# Patient Record
Sex: FEMALE | Race: ASIAN | NOT HISPANIC OR LATINO
[De-identification: names, ages, dates, MRNs, and addresses within clinical notes are randomized per-mention and may not be internally consistent; named-entity substitution may affect disease eponyms.]

---

## 2017-04-04 ENCOUNTER — APPOINTMENT (OUTPATIENT)
Dept: INTERNAL MEDICINE | Facility: CLINIC | Age: 67
End: 2017-04-04

## 2017-04-04 VITALS
WEIGHT: 158 LBS | HEART RATE: 80 BPM | HEIGHT: 58 IN | BODY MASS INDEX: 33.17 KG/M2 | SYSTOLIC BLOOD PRESSURE: 150 MMHG | TEMPERATURE: 98.3 F | DIASTOLIC BLOOD PRESSURE: 90 MMHG

## 2017-04-04 DIAGNOSIS — Z86.59 PERSONAL HISTORY OF OTHER MENTAL AND BEHAVIORAL DISORDERS: ICD-10-CM

## 2017-04-04 DIAGNOSIS — F41.9 ANXIETY DISORDER, UNSPECIFIED: ICD-10-CM

## 2017-04-18 ENCOUNTER — APPOINTMENT (OUTPATIENT)
Dept: OTOLARYNGOLOGY | Facility: CLINIC | Age: 67
End: 2017-04-18

## 2017-04-18 VITALS
DIASTOLIC BLOOD PRESSURE: 84 MMHG | HEIGHT: 59 IN | SYSTOLIC BLOOD PRESSURE: 147 MMHG | BODY MASS INDEX: 31.85 KG/M2 | HEART RATE: 82 BPM | WEIGHT: 158 LBS

## 2017-04-18 DIAGNOSIS — H90.3 SENSORINEURAL HEARING LOSS, BILATERAL: ICD-10-CM

## 2017-05-10 ENCOUNTER — APPOINTMENT (OUTPATIENT)
Dept: INTERNAL MEDICINE | Facility: CLINIC | Age: 67
End: 2017-05-10

## 2017-05-10 VITALS
BODY MASS INDEX: 31.45 KG/M2 | DIASTOLIC BLOOD PRESSURE: 80 MMHG | HEIGHT: 59 IN | TEMPERATURE: 98.2 F | OXYGEN SATURATION: 98 % | SYSTOLIC BLOOD PRESSURE: 138 MMHG | WEIGHT: 156 LBS | HEART RATE: 88 BPM

## 2017-07-11 ENCOUNTER — APPOINTMENT (OUTPATIENT)
Dept: INTERNAL MEDICINE | Facility: CLINIC | Age: 67
End: 2017-07-11

## 2017-08-14 ENCOUNTER — MOBILE ON CALL (OUTPATIENT)
Age: 67
End: 2017-08-14

## 2017-09-13 ENCOUNTER — APPOINTMENT (OUTPATIENT)
Dept: INTERNAL MEDICINE | Facility: CLINIC | Age: 67
End: 2017-09-13

## 2017-09-20 ENCOUNTER — APPOINTMENT (OUTPATIENT)
Dept: INTERNAL MEDICINE | Facility: CLINIC | Age: 67
End: 2017-09-20

## 2017-10-03 ENCOUNTER — APPOINTMENT (OUTPATIENT)
Dept: INTERNAL MEDICINE | Facility: CLINIC | Age: 67
End: 2017-10-03
Payer: COMMERCIAL

## 2017-10-03 VITALS
TEMPERATURE: 98.9 F | BODY MASS INDEX: 31.45 KG/M2 | SYSTOLIC BLOOD PRESSURE: 115 MMHG | OXYGEN SATURATION: 98 % | HEART RATE: 81 BPM | WEIGHT: 156 LBS | DIASTOLIC BLOOD PRESSURE: 80 MMHG | HEIGHT: 59 IN

## 2017-10-03 DIAGNOSIS — H61.21 IMPACTED CERUMEN, RIGHT EAR: ICD-10-CM

## 2017-10-03 DIAGNOSIS — R05 COUGH: ICD-10-CM

## 2017-10-03 DIAGNOSIS — Z86.69 PERSONAL HISTORY OF OTHER DISEASES OF THE NERVOUS SYSTEM AND SENSE ORGANS: ICD-10-CM

## 2017-10-03 DIAGNOSIS — Z00.00 ENCOUNTER FOR GENERAL ADULT MEDICAL EXAMINATION W/OUT ABNORMAL FINDINGS: ICD-10-CM

## 2017-10-03 PROCEDURE — 99214 OFFICE O/P EST MOD 30 MIN: CPT | Mod: 25

## 2017-10-03 PROCEDURE — 90686 IIV4 VACC NO PRSV 0.5 ML IM: CPT

## 2017-10-03 PROCEDURE — G0008: CPT

## 2017-10-03 RX ORDER — CETIRIZINE HYDROCHLORIDE 10 MG/1
10 CAPSULE, LIQUID FILLED ORAL DAILY
Qty: 30 | Refills: 3 | Status: DISCONTINUED | COMMUNITY
Start: 2017-04-04 | End: 2017-10-03

## 2017-10-03 RX ORDER — OMEPRAZOLE 20 MG/1
20 CAPSULE, DELAYED RELEASE ORAL
Qty: 30 | Refills: 2 | Status: DISCONTINUED | COMMUNITY
Start: 2017-08-14 | End: 2017-10-03

## 2017-10-03 RX ORDER — OMEPRAZOLE 20 MG/1
20 CAPSULE, DELAYED RELEASE ORAL
Qty: 30 | Refills: 2 | Status: DISCONTINUED | COMMUNITY
Start: 2017-05-10 | End: 2017-10-03

## 2017-10-09 ENCOUNTER — FORM ENCOUNTER (OUTPATIENT)
Age: 67
End: 2017-10-09

## 2017-10-10 ENCOUNTER — OUTPATIENT (OUTPATIENT)
Dept: OUTPATIENT SERVICES | Facility: HOSPITAL | Age: 67
LOS: 1 days | End: 2017-10-10
Payer: COMMERCIAL

## 2017-10-10 ENCOUNTER — APPOINTMENT (OUTPATIENT)
Dept: MAMMOGRAPHY | Facility: IMAGING CENTER | Age: 67
End: 2017-10-10
Payer: COMMERCIAL

## 2017-10-10 DIAGNOSIS — Z00.00 ENCOUNTER FOR GENERAL ADULT MEDICAL EXAMINATION WITHOUT ABNORMAL FINDINGS: ICD-10-CM

## 2017-10-10 PROCEDURE — 77063 BREAST TOMOSYNTHESIS BI: CPT | Mod: 26

## 2017-10-10 PROCEDURE — G0202: CPT | Mod: 26

## 2017-10-10 PROCEDURE — 77067 SCR MAMMO BI INCL CAD: CPT

## 2017-10-10 PROCEDURE — 77063 BREAST TOMOSYNTHESIS BI: CPT

## 2017-10-23 ENCOUNTER — RESULT REVIEW (OUTPATIENT)
Age: 67
End: 2017-10-23

## 2017-10-24 ENCOUNTER — APPOINTMENT (OUTPATIENT)
Dept: OBGYN | Facility: CLINIC | Age: 67
End: 2017-10-24
Payer: COMMERCIAL

## 2017-10-24 PROCEDURE — 99396 PREV VISIT EST AGE 40-64: CPT

## 2017-10-25 ENCOUNTER — OUTPATIENT (OUTPATIENT)
Dept: OUTPATIENT SERVICES | Facility: HOSPITAL | Age: 67
LOS: 1 days | End: 2017-10-25
Payer: COMMERCIAL

## 2017-10-25 ENCOUNTER — APPOINTMENT (OUTPATIENT)
Dept: RADIOLOGY | Facility: IMAGING CENTER | Age: 67
End: 2017-10-25
Payer: COMMERCIAL

## 2017-10-25 DIAGNOSIS — M85.80 OTHER SPECIFIED DISORDERS OF BONE DENSITY AND STRUCTURE, UNSPECIFIED SITE: ICD-10-CM

## 2017-10-25 DIAGNOSIS — Z00.8 ENCOUNTER FOR OTHER GENERAL EXAMINATION: ICD-10-CM

## 2017-10-25 PROCEDURE — 77080 DXA BONE DENSITY AXIAL: CPT | Mod: 26

## 2017-10-25 PROCEDURE — 77080 DXA BONE DENSITY AXIAL: CPT

## 2017-10-31 ENCOUNTER — APPOINTMENT (OUTPATIENT)
Dept: INTERNAL MEDICINE | Facility: CLINIC | Age: 67
End: 2017-10-31

## 2019-01-02 ENCOUNTER — APPOINTMENT (OUTPATIENT)
Dept: INTERNAL MEDICINE | Facility: CLINIC | Age: 69
End: 2019-01-02
Payer: COMMERCIAL

## 2019-01-02 VITALS
HEIGHT: 59 IN | HEART RATE: 100 BPM | BODY MASS INDEX: 26.61 KG/M2 | SYSTOLIC BLOOD PRESSURE: 120 MMHG | OXYGEN SATURATION: 96 % | TEMPERATURE: 98.7 F | WEIGHT: 132 LBS | DIASTOLIC BLOOD PRESSURE: 78 MMHG

## 2019-01-02 PROCEDURE — 99213 OFFICE O/P EST LOW 20 MIN: CPT

## 2019-01-02 NOTE — REVIEW OF SYSTEMS
[Fatigue] : fatigue [Recent Change In Weight] : ~T recent weight change [Negative] : Neurological [Fever] : no fever [Chills] : no chills [Night Sweats] : no night sweats

## 2019-01-02 NOTE — PHYSICAL EXAM
[No Acute Distress] : no acute distress [Well Nourished] : well nourished [Well Developed] : well developed [Well-Appearing] : well-appearing [Normal Sclera/Conjunctiva] : normal sclera/conjunctiva [Normal Oropharynx] : the oropharynx was normal [No Lymphadenopathy] : no lymphadenopathy [Clear to Auscultation] : lungs were clear to auscultation bilaterally [Regular Rhythm] : with a regular rhythm [Soft] : abdomen soft [Non-distended] : non-distended [No HSM] : no HSM [Normal Bowel Sounds] : normal bowel sounds [de-identified] : mildly tender l&r

## 2019-01-02 NOTE — ASSESSMENT
[FreeTextEntry1] : hx fever; loss of appetite and abd pain\par doubt continued infection\par check labs incl stool culture/o&p, cbc/cmp\par consider id or gi referal

## 2019-01-02 NOTE — HISTORY OF PRESENT ILLNESS
[FreeTextEntry8] : Pt went [place]ve[place], while there developed stomach pains no fever, n/v/c/d, took "pain" meds there; came back 12/16 last week (~12/25) developed fever to 103 x 5 days, now resolved; still has stomach pain but "30% improved", nl stool; fatigued + 20-25 lb wt loss since last visit (october). No prophyalxis w/travel.

## 2019-01-03 LAB
ALBUMIN SERPL ELPH-MCNC: 3.8 G/DL
ALP BLD-CCNC: 151 U/L
ALT SERPL-CCNC: 15 U/L
AMYLASE/CREAT SERPL: 52 U/L
ANION GAP SERPL CALC-SCNC: 14 MMOL/L
AST SERPL-CCNC: 46 U/L
BASOPHILS # BLD AUTO: 0.06 K/UL
BASOPHILS NFR BLD AUTO: 0.3 %
BILIRUB SERPL-MCNC: 0.7 MG/DL
BUN SERPL-MCNC: 10 MG/DL
CALCIUM SERPL-MCNC: 9.5 MG/DL
CHLORIDE SERPL-SCNC: 95 MMOL/L
CO2 SERPL-SCNC: 24 MMOL/L
CREAT SERPL-MCNC: 0.63 MG/DL
EOSINOPHIL # BLD AUTO: 0.1 K/UL
EOSINOPHIL NFR BLD AUTO: 0.5 %
GLUCOSE SERPL-MCNC: 98 MG/DL
HCT VFR BLD CALC: 42.5 %
HGB BLD-MCNC: 13.7 G/DL
IMM GRANULOCYTES NFR BLD AUTO: 0.5 %
LPL SERPL-CCNC: 42 U/L
LYMPHOCYTES # BLD AUTO: 3.08 K/UL
LYMPHOCYTES NFR BLD AUTO: 16.1 %
MAN DIFF?: NORMAL
MCHC RBC-ENTMCNC: 28.8 PG
MCHC RBC-ENTMCNC: 32.2 GM/DL
MCV RBC AUTO: 89.5 FL
MONOCYTES # BLD AUTO: 1.81 K/UL
MONOCYTES NFR BLD AUTO: 9.5 %
NEUTROPHILS # BLD AUTO: 13.95 K/UL
NEUTROPHILS NFR BLD AUTO: 73.1 %
PLATELET # BLD AUTO: 388 K/UL
POTASSIUM SERPL-SCNC: 5 MMOL/L
PROT SERPL-MCNC: 7.6 G/DL
RBC # BLD: 4.75 M/UL
RBC # FLD: 14.5 %
SODIUM SERPL-SCNC: 133 MMOL/L
WBC # FLD AUTO: 19.09 K/UL

## 2019-01-07 ENCOUNTER — APPOINTMENT (OUTPATIENT)
Dept: INFECTIOUS DISEASE | Facility: CLINIC | Age: 69
End: 2019-01-07
Payer: COMMERCIAL

## 2019-01-07 ENCOUNTER — LABORATORY RESULT (OUTPATIENT)
Age: 69
End: 2019-01-07

## 2019-01-07 VITALS
SYSTOLIC BLOOD PRESSURE: 137 MMHG | HEIGHT: 59 IN | DIASTOLIC BLOOD PRESSURE: 82 MMHG | TEMPERATURE: 96.7 F | BODY MASS INDEX: 27.01 KG/M2 | RESPIRATION RATE: 14 BRPM | HEART RATE: 84 BPM | WEIGHT: 134 LBS | OXYGEN SATURATION: 100 %

## 2019-01-07 PROCEDURE — 99205 OFFICE O/P NEW HI 60 MIN: CPT

## 2019-01-07 NOTE — ASSESSMENT
[FreeTextEntry1] : 68 f with no significant past medical history, originally from [place] now [place] in the [place], came back a month trip to [place] about 3 weeks ago, referred to ID for fever, loss of appetite, weight loss, intermittent abd pain, leukocytosis 19\par \par * pt did not have any fever for 4 days and feels overall better\par * will check CBC, CMP, blood culture\par * brucella serology\par * u/a, urine cx, stool PCR\par * abd/pelvis CT with contrast\par * no antibiotics for now\par * RTC in 2 weeks

## 2019-01-07 NOTE — HISTORY OF PRESENT ILLNESS
[FreeTextEntry1] : 68 f with no significant past medical history, originally from [place] the [place], came back a month trip to [facility] about 3 weeks ago. She developed low grade fevers and abd pain there and then when she came back the fever went as high as 103 but no chills or sweating, joint pain, rash, cough, N/V/D or dysuria. The abd pain is intermittent and not severe, sometimes back pain on both sides.\par she had lost over 20 ibs within the past 3 weeks with loss of appetite and fatigue.\par She denied sick contacts. TB exposure, unpasteurized dairy products or bug bites\par She did not take any malaria ppx for her trip but received a flu shot before going\par She went to her PMD and had a WBC of 19\par She stated that for the past 4 days she did not have a fever and overall feels better\par patient's cell phone: [phone]

## 2019-01-07 NOTE — REVIEW OF SYSTEMS
[Fever] : fever [Chills] : no chills [Body Aches] : no body aches [Recent Weight Loss (___ Lbs)] : recent [unfilled] ~Ulb weight loss [Eye Pain] : no eye pain [Red Eyes] : eyes not red [Earache] : no earache [Loss Of Hearing] : no hearing loss [Chest Pain] : no chest pain [Palpitations] : no palpitations [Shortness Of Breath] : no shortness of breath [Wheezing] : no wheezing [Cough] : no cough [Dysuria] : no dysuria [Pelvic Pain] : no pelvic pain [Vaginal Discharge] : no vaginal discharge [Joint Pain] : no joint pain [Joint Swelling] : no joint swelling [Skin Lesions] : no skin lesions [Skin Wound] : no skin wound [Confused] : no confusion [Convulsions] : no convulsions [Suicidal] : not suicidal [Anxiety] : no anxiety [Easy Bleeding] : no tendency for easy bleeding [Easy Bruising] : no tendency for easy bruising [Negative] : Heme/Lymph

## 2019-01-07 NOTE — PHYSICAL EXAM
[General Appearance - Alert] : alert [General Appearance - In No Acute Distress] : in no acute distress [General Appearance - Well Nourished] : well nourished [Sclera] : the sclera and conjunctiva were normal [Extraocular Movements] : extraocular movements were intact [Outer Ear] : the ears and nose were normal in appearance [Oropharynx] : the oropharynx was normal with no thrush [Neck Appearance] : the appearance of the neck was normal [Neck Cervical Mass (___cm)] : no neck mass was observed [Auscultation Breath Sounds / Voice Sounds] : lungs were clear to auscultation bilaterally [Heart Rate And Rhythm] : heart rate was normal and rhythm regular [Heart Sounds] : normal S1 and S2 [Full Pulse] : the pedal pulses are present [Edema] : there was no peripheral edema [Bowel Sounds] : normal bowel sounds [Abdomen Soft] : soft [Costovertebral Angle Tenderness] : no CVA tenderness [No Palpable Adenopathy] : no palpable adenopathy [Musculoskeletal - Swelling] : no joint swelling [Skin Color & Pigmentation] : normal skin color and pigmentation [] : no rash [Cranial Nerves] : cranial nerves 2-12 were intact [No Focal Deficits] : no focal deficits [Oriented To Time, Place, And Person] : oriented to person, place, and time [Affect] : the affect was normal

## 2019-01-08 LAB
ALBUMIN SERPL ELPH-MCNC: 3.3 G/DL
ALP BLD-CCNC: 148 U/L
ALT SERPL-CCNC: 16 U/L
ANION GAP SERPL CALC-SCNC: 11 MMOL/L
APPEARANCE: ABNORMAL
AST SERPL-CCNC: 53 U/L
BACTERIA STL CULT: NORMAL
BACTERIA: NEGATIVE
BASOPHILS # BLD AUTO: 0.05 K/UL
BASOPHILS NFR BLD AUTO: 0.3 %
BILIRUB SERPL-MCNC: 0.5 MG/DL
BILIRUBIN URINE: ABNORMAL
BLOOD URINE: NEGATIVE
BUN SERPL-MCNC: 6 MG/DL
CALCIUM SERPL-MCNC: 9.8 MG/DL
CHLORIDE SERPL-SCNC: 100 MMOL/L
CO2 SERPL-SCNC: 24 MMOL/L
COLOR: ABNORMAL
CREAT SERPL-MCNC: 0.46 MG/DL
DEPRECATED O AND P PREP STL: ABNORMAL
EOSINOPHIL # BLD AUTO: 0.2 K/UL
EOSINOPHIL NFR BLD AUTO: 1 %
GLUCOSE QUALITATIVE U: NEGATIVE MG/DL
GLUCOSE SERPL-MCNC: 74 MG/DL
HCT VFR BLD CALC: 40.6 %
HGB BLD-MCNC: 12.7 G/DL
HYALINE CASTS: 0 /LPF
IMM GRANULOCYTES NFR BLD AUTO: 0.5 %
KETONES URINE: ABNORMAL
LEUKOCYTE ESTERASE URINE: NEGATIVE
LYMPHOCYTES # BLD AUTO: 2.73 K/UL
LYMPHOCYTES NFR BLD AUTO: 14.1 %
MAN DIFF?: NORMAL
MCHC RBC-ENTMCNC: 28.8 PG
MCHC RBC-ENTMCNC: 31.3 GM/DL
MCV RBC AUTO: 92.1 FL
MICROSCOPIC-UA: NORMAL
MONOCYTES # BLD AUTO: 1.6 K/UL
MONOCYTES NFR BLD AUTO: 8.3 %
NEUTROPHILS # BLD AUTO: 14.67 K/UL
NEUTROPHILS NFR BLD AUTO: 75.8 %
NITRITE URINE: NEGATIVE
PH URINE: 5.5
PLATELET # BLD AUTO: 491 K/UL
POTASSIUM SERPL-SCNC: 5.3 MMOL/L
PROT SERPL-MCNC: 7.7 G/DL
PROTEIN URINE: ABNORMAL MG/DL
RBC # BLD: 4.41 M/UL
RBC # FLD: 14.5 %
RED BLOOD CELLS URINE: 5 /HPF
SODIUM SERPL-SCNC: 135 MMOL/L
SPECIFIC GRAVITY URINE: 1.03
SQUAMOUS EPITHELIAL CELLS: 5 /HPF
URINE COMMENTS: NORMAL
UROBILINOGEN URINE: NEGATIVE MG/DL
WBC # FLD AUTO: 19.34 K/UL
WHITE BLOOD CELLS URINE: 2 /HPF

## 2019-01-11 ENCOUNTER — FORM ENCOUNTER (OUTPATIENT)
Age: 69
End: 2019-01-11

## 2019-01-12 ENCOUNTER — APPOINTMENT (OUTPATIENT)
Dept: CT IMAGING | Facility: IMAGING CENTER | Age: 69
End: 2019-01-12
Payer: COMMERCIAL

## 2019-01-12 ENCOUNTER — OUTPATIENT (OUTPATIENT)
Dept: OUTPATIENT SERVICES | Facility: HOSPITAL | Age: 69
LOS: 1 days | End: 2019-01-12
Payer: COMMERCIAL

## 2019-01-12 DIAGNOSIS — R10.9 UNSPECIFIED ABDOMINAL PAIN: ICD-10-CM

## 2019-01-12 PROCEDURE — 74177 CT ABD & PELVIS W/CONTRAST: CPT

## 2019-01-12 PROCEDURE — 74177 CT ABD & PELVIS W/CONTRAST: CPT | Mod: 26

## 2019-01-14 ENCOUNTER — OTHER (OUTPATIENT)
Age: 69
End: 2019-01-14

## 2019-01-14 ENCOUNTER — MOBILE ON CALL (OUTPATIENT)
Age: 69
End: 2019-01-14

## 2019-01-14 LAB
BACTERIA BLD CULT: NORMAL
BACTERIA UR CULT: NORMAL
BRUCELLA  AB SCREEN IGM, S: NEGATIVE
BRUCELLA AB SCREEN IGG, S: NEGATIVE
GI PCR PANEL, STOOL: NORMAL
INTERPRETATION: NORMAL
M TB IFN-G BLD-IMP: NEGATIVE
QUANTIFERON TB PLUS MITOGEN MINUS NIL: 0.8 IU/ML
QUANTIFERON TB PLUS NIL: 0.02 IU/ML
QUANTIFERON TB PLUS TB1 MINUS NIL: 0 IU/ML
QUANTIFERON TB PLUS TB2 MINUS NIL: 0 IU/ML

## 2019-01-15 ENCOUNTER — INPATIENT (INPATIENT)
Facility: HOSPITAL | Age: 69
LOS: 2 days | Discharge: HOME | End: 2019-01-18
Attending: HOSPITALIST | Admitting: HOSPITALIST
Payer: COMMERCIAL

## 2019-01-15 VITALS
RESPIRATION RATE: 16 BRPM | DIASTOLIC BLOOD PRESSURE: 102 MMHG | TEMPERATURE: 97 F | OXYGEN SATURATION: 100 % | HEART RATE: 95 BPM | SYSTOLIC BLOOD PRESSURE: 150 MMHG

## 2019-01-15 DIAGNOSIS — N83.9 NONINFLAMMATORY DISORDER OF OVARY, FALLOPIAN TUBE AND BROAD LIGAMENT, UNSPECIFIED: ICD-10-CM

## 2019-01-15 DIAGNOSIS — R65.10 SYSTEMIC INFLAMMATORY RESPONSE SYNDROME (SIRS) OF NON-INFECTIOUS ORIGIN WITHOUT ACUTE ORGAN DYSFUNCTION: ICD-10-CM

## 2019-01-15 DIAGNOSIS — K75.0 ABSCESS OF LIVER: ICD-10-CM

## 2019-01-15 DIAGNOSIS — K76.9 LIVER DISEASE, UNSPECIFIED: ICD-10-CM

## 2019-01-15 DIAGNOSIS — Z29.9 ENCOUNTER FOR PROPHYLACTIC MEASURES, UNSPECIFIED: ICD-10-CM

## 2019-01-15 PROCEDURE — 99223 1ST HOSP IP/OBS HIGH 75: CPT

## 2019-01-15 PROCEDURE — 99223 1ST HOSP IP/OBS HIGH 75: CPT | Mod: GC

## 2019-01-15 RX ORDER — PIPERACILLIN AND TAZOBACTAM 4; .5 G/20ML; G/20ML
3.38 INJECTION, POWDER, LYOPHILIZED, FOR SOLUTION INTRAVENOUS ONCE
Qty: 0 | Refills: 0 | Status: DISCONTINUED | OUTPATIENT
Start: 2019-01-15 | End: 2019-01-15

## 2019-01-15 RX ORDER — ACETAMINOPHEN 500 MG
650 TABLET ORAL EVERY 6 HOURS
Qty: 0 | Refills: 0 | Status: DISCONTINUED | OUTPATIENT
Start: 2019-01-15 | End: 2019-01-18

## 2019-01-15 RX ORDER — PIPERACILLIN AND TAZOBACTAM 4; .5 G/20ML; G/20ML
3.38 INJECTION, POWDER, LYOPHILIZED, FOR SOLUTION INTRAVENOUS ONCE
Qty: 0 | Refills: 0 | Status: COMPLETED | OUTPATIENT
Start: 2019-01-15 | End: 2019-01-15

## 2019-01-15 RX ORDER — METRONIDAZOLE 500 MG
500 TABLET ORAL ONCE
Qty: 0 | Refills: 0 | Status: COMPLETED | OUTPATIENT
Start: 2019-01-15 | End: 2019-01-15

## 2019-01-15 RX ORDER — SODIUM CHLORIDE 9 MG/ML
2000 INJECTION INTRAMUSCULAR; INTRAVENOUS; SUBCUTANEOUS ONCE
Qty: 0 | Refills: 0 | Status: COMPLETED | OUTPATIENT
Start: 2019-01-15 | End: 2019-01-15

## 2019-01-15 RX ORDER — CEFTRIAXONE 500 MG/1
2 INJECTION, POWDER, FOR SOLUTION INTRAMUSCULAR; INTRAVENOUS ONCE
Qty: 0 | Refills: 0 | Status: COMPLETED | OUTPATIENT
Start: 2019-01-15 | End: 2019-01-15

## 2019-01-15 RX ADMIN — CEFTRIAXONE 100 GRAM(S): 500 INJECTION, POWDER, FOR SOLUTION INTRAMUSCULAR; INTRAVENOUS at 11:47

## 2019-01-15 RX ADMIN — PIPERACILLIN AND TAZOBACTAM 200 GRAM(S): 4; .5 INJECTION, POWDER, LYOPHILIZED, FOR SOLUTION INTRAVENOUS at 17:37

## 2019-01-15 RX ADMIN — Medication 100 MILLIGRAM(S): at 11:48

## 2019-01-15 RX ADMIN — SODIUM CHLORIDE 1000 MILLILITER(S): 9 INJECTION INTRAMUSCULAR; INTRAVENOUS; SUBCUTANEOUS at 11:48

## 2019-01-15 NOTE — H&P ADULT - ASSESSMENT
67 yo F with no known medical history presenting w/ liver mass noted on outpatient CT in the setting of intermittent dull, diffuse abdominal pain x1 month and recent travel to [place].

## 2019-01-15 NOTE — ED PROVIDER NOTE - ATTENDING CONTRIBUTION TO CARE
Patient presents for eval of concern for poss liver abscess found outpatient ct. Patient had fevers/abd pain while in [place] 1 month ago, symptoms improved but has still been having intermittent upper abd pain over last 10d, last episode was last night. Had fevers previously but not in recent days. No ha, neck pain, cp, sob, vomiting, diarrhea, dysuria. Went to id md and had w/u including ct done 3 d ago which was concerning for poss liver abscess. sent to ed for further eval, has no symptoms at this time.  exam  GEN - NAD; well appearing; A+O x3   HEAD - NC/AT   EYES- PERRL, EOMI  ENT: Airway patent, mmm, Oral cavity and pharynx normal. No inflammation, swelling, exudate, or lesions.  NECK: Neck supple, non-tender without lymphadenopathy, no masses.  PULMONARY - CTA b/l, symmetric breath sounds.   CARDIAC -s1s2, RRR, no M,G,R  ABDOMEN - +BS, ND, NT, soft, no guarding, no rebound, no masses   BACK - no CVA tenderness, Normal  spine   EXTREMITIES - FROM, symmetric pulses, capillary refill < 2 seconds, no edema   SKIN - no rash or bruising   NEUROLOGIC - alert, speech clear, no focal deficits  PSYCH -nl mood/affect, nl insight.  a/p-patient with int abd pain, prev fevers, found to have poss liver abscess as outpatient, recent travel to [place], nontoxic appearing, abd nontender, vss, will treat with abx, labs, and admit for further w/u.

## 2019-01-15 NOTE — ED PROVIDER NOTE - OBJECTIVE STATEMENT
69 y/o F with no pertinent medical history presents with complaint of liver abscess noted on outpatient CT. Patient began to have fever and abdominal pain while visiting [place] one month ago. Has been having elevated WBCs and undergoing outpatient w/u. Saw infectious disease specialist and had blood/stool labs as well as CT which saw a liver abscess. Last fever was 10 days ago but still having some faint upper abdominal pain. +Weight loss of approximately 15 lbs. PMD Dr. Tirado. 67 y/o F with no pertinent medical history presents with complaint of liver abscess noted on outpatient CT. Patient began to have fever and abdominal pain while visiting [place] one month ago. Has been having elevated WBCs and undergoing outpatient w/u. Saw infectious disease specialist and had blood/stool labs as well as CT which saw a liver abscess. Last fever was 10 days ago but still having some faint upper abdominal pain. +Weight loss of approximately 15 lbs. PMD Dr. Luigi Aguillon and ID Dr. Marley.

## 2019-01-15 NOTE — H&P ADULT - NSHPREVIEWOFSYSTEMS_GEN_ALL_CORE
REVIEW OF SYSTEMS:    CONSTITUTIONAL: No weakness, fatigue, malaise, fevers or chills, no weight change, appetite change  EYES: No visual changes; No double vision,  No vertigo, eye pain  Ears: no otalgia, no otorrhea, no hearing loss, tinnitus  Nose: no epistaxis, rhinorrhea, post-discharge, sinus pressure  Throat: no throat pain, no oral lesions, tooth pain   NECK: No pain or stiffness  RESPIRATORY: No cough (productive or dry), wheezing, hemoptysis; No shortness of breath, orthopnea, PND, NAVA, snoring,  CARDIOVASCULAR: No chest pain or palpitations, no leg edema, no claudication    GASTROINTESTINAL: No abdominal or epigastric pain. No nausea, vomiting, or hematemesis; No diarrhea or constipation. No melena or hematochezia.  GENITOURINARY: No dysuria, frequency, urgency or hematuria, no pelvic pain, urinary incontinence, urgency  Musculoskeletal: no joints or muscle pain, no swelling in joints or muscles  NEUROLOGICAL: No numbness or weakness, headache, memory loss, seizures, dizziness, vertigo, syncope, ataxia  SKIN: No pruritis, rashes, lesions or new moles  Psych: No anxiety, sadness, insomnia, suicide thoughts  Endocrine: No Heat or Cold intolerance, polydipsia, polyphagia  Heme/Lymph: no LN enlargement, no easy bruising or bleeding REVIEW OF SYSTEMS:    CONSTITUTIONAL: + fevers, weight loss, appetite change. No weakness, fatigue, malaise  EYES: No visual changes; No double vision,  No vertigo, eye pain  Ears: no otalgia, no otorrhea, no hearing loss, tinnitus  Nose: no epistaxis, rhinorrhea, post-discharge, sinus pressure  Throat: no throat pain, no oral lesions, tooth pain   NECK: No pain or stiffness  RESPIRATORY: No cough (productive or dry), wheezing, hemoptysis; No shortness of breath, orthopnea, PND, NAVA, snoring,  CARDIOVASCULAR: No chest pain or palpitations, no leg edema, no claudication    GASTROINTESTINAL: No abdominal or epigastric pain. No nausea, vomiting, or hematemesis; No diarrhea or constipation. No melena or hematochezia.  GENITOURINARY: No dysuria, frequency, urgency or hematuria, no pelvic pain, urinary incontinence, urgency  Musculoskeletal: no joints or muscle pain, no swelling in joints or muscles  NEUROLOGICAL: No numbness or weakness, headache, memory loss, seizures, dizziness, vertigo, syncope, ataxia  SKIN: No pruritis, rashes, lesions or new moles  Psych: No anxiety, sadness, insomnia, suicide thoughts  Endocrine: No Heat or Cold intolerance, polydipsia, polyphagia  Heme/Lymph: no LN enlargement, no easy bruising or bleeding

## 2019-01-15 NOTE — ED PROCEDURE NOTE - PROCEDURE ADDITIONAL DETAILS
Limited RUQ US shows no gallstones, no gallbladder wall thickening and no hydrops. There is a very small amount of pericholecystic and perihepatic free fluid. There is a heterogenous soft tissue mass measuring 10.3x7.8x9cm with good vascular flow, along with multiple small hypoechoic masses around the liver; differential includes neoplastic/metastatic/infectious process. See images and report in chart.  Valerie [id number]

## 2019-01-15 NOTE — ED PROVIDER NOTE - PHYSICAL EXAMINATION
Gen:  alert, awake, no acute distress  HEENT:  atraumatic head, airway clear, pupils equal and round  CV:  rrr, nl S1, S2, no m/r/g  Pulm:  lungs CTA b/l  Abd: abdomen soft, tenderness in RUQ, no guarding or rebound  Ext:  moving all extremities  Neuro:  grossly intact, no focal deficits  Skin:  pale appearing Gen:  alert, awake, no acute distress  HEENT:  atraumatic head, airway clear, pupils equal and round  CV:  rrr, nl S1, S2, no m/r/g  Pulm:  lungs CTA b/l  Abd: abdomen soft, nontender, no guarding or rebound  Ext:  moving all extremities  Neuro:  grossly intact, no focal deficits  Skin:  pale appearing

## 2019-01-15 NOTE — H&P ADULT - PROBLEM SELECTOR PLAN 1
Pt w/ HR>90, leukocytosis of 19. Found to have to have multiple hepatic lesions, largest 11.1 cm. DDx includes infectious and neoplastic etiology.  - will c/w zosyn  - ID following  - will c/s IR for possible drainage Pt w/ HR>90, leukocytosis of 19. Found to have to have multiple hepatic lesions, largest 11.1 cm. DDx includes infectious and neoplastic etiology.  - will c/w zosyn  - ID following  - will c/s IR, will determine whether mass is drainable Pt w/ HR>90, leukocytosis of 19. Found to have to have multiple hepatic lesions, largest 11.1 cm. DDx includes infectious and neoplastic etiology.  - will c/w zosyn  - ID following  - will c/s IR, will determine whether mass is drainable  - will c/s GS  - f/u Bcxs Pt w/ HR>90, leukocytosis of 19. Found to have to have multiple hepatic lesions, largest 11.1 cm. DDx includes infectious and neoplastic etiology.  - will c/w zosyn  - ID following  - will c/s IR, will determine whether mass is drainable  - f/u Bcxs

## 2019-01-15 NOTE — CHART NOTE - NSCHARTNOTEFT_GEN_A_CORE
IR Pre-Procedure Note    Patient Age:   68y    Patient Gender:   Female    Procedure (including site / side if known): Liver fluid aspiration/biopsy    Diagnosis / Indication: Patient is a 68y old  Female who presents with a chief complaint of hepatic abscess (15 Catrachito 2019 15:00)      Interventional Radiology Attending Physician: Dr. Ocampo    Ordering Attending Physician: Dr. Byrd    PAST MEDICAL & SURGICAL HISTORY:  Obesity: BMI:  34.7  Grief at loss of child: Intermittent, s/p loss of child &#x27; 2013  Hypertension: Borderline. No meds  S/P tooth extraction: &#x27; 2011       Pertinent Labs:   CBC Full  -  ( 15 Catrachito 2019 10:10 )  WBC Count : 19.00 K/uL  Hemoglobin : 12.3 g/dL  Hematocrit : 37.5 %  Platelet Count - Automated : 386 K/uL  Mean Cell Volume : 87.0 fL  Mean Cell Hemoglobin : 28.5 pg  Mean Cell Hemoglobin Concentration : 32.8 %  Auto Neutrophil # : 14.95 K/uL  Auto Lymphocyte # : 2.46 K/uL  Auto Monocyte # : 1.35 K/uL  Auto Eosinophil # : 0.06 K/uL  Auto Basophil # : 0.07 K/uL  Auto Neutrophil % : 78.7 %  Auto Lymphocyte % : 12.9 %  Auto Monocyte % : 7.1 %  Auto Eosinophil % : 0.3 %  Auto Basophil % : 0.4 %    01-15    134<L>  |  97<L>  |  7   ----------------------------<  91  4.1   |  26  |  0.46<L>    Ca    9.9      15 Catrachito 2019 10:10    TPro  7.9  /  Alb  3.7  /  TBili  0.6  /  DBili  x   /  AST  50<H>  /  ALT  15  /  AlkPhos  164<H>  01-15    PT/INR - ( 15 Catrachito 2019 10:10 )   PT: 12.7 SEC;   INR: 1.11          PTT - ( 15 Catrachito 2019 10:10 )  PTT:28.6 SEC    Patient / Family aware of procedure:   [x] Y   [  ] N IR Pre-Procedure Note    Patient Age:   68y    Patient Gender:   Female    Procedure (including site / side if known): Liver fluid aspiration/biopsy    Diagnosis / Indication: Patient is a 68y old  Female who presents with a chief complaint of hepatic abscess (15 Catrachiot 2019 15:00)      Interventional Radiology Attending Physician: Dr. Ocampo    ****IF POSSIBLE PLEASE SEND FOR bacterial, fungal and AFB cultures of the abscess*********    Ordering Attending Physician: Dr. Byrd    PAST MEDICAL & SURGICAL HISTORY:  Obesity: BMI:  34.7  Grief at loss of child: Intermittent, s/p loss of child &#x27; 2013  Hypertension: Borderline. No meds  S/P tooth extraction: &#x27; 2011       Pertinent Labs:   CBC Full  -  ( 15 Catrachito 2019 10:10 )  WBC Count : 19.00 K/uL  Hemoglobin : 12.3 g/dL  Hematocrit : 37.5 %  Platelet Count - Automated : 386 K/uL  Mean Cell Volume : 87.0 fL  Mean Cell Hemoglobin : 28.5 pg  Mean Cell Hemoglobin Concentration : 32.8 %  Auto Neutrophil # : 14.95 K/uL  Auto Lymphocyte # : 2.46 K/uL  Auto Monocyte # : 1.35 K/uL  Auto Eosinophil # : 0.06 K/uL  Auto Basophil # : 0.07 K/uL  Auto Neutrophil % : 78.7 %  Auto Lymphocyte % : 12.9 %  Auto Monocyte % : 7.1 %  Auto Eosinophil % : 0.3 %  Auto Basophil % : 0.4 %    01-15    134<L>  |  97<L>  |  7   ----------------------------<  91  4.1   |  26  |  0.46<L>    Ca    9.9      15 Catrachito 2019 10:10    TPro  7.9  /  Alb  3.7  /  TBili  0.6  /  DBili  x   /  AST  50<H>  /  ALT  15  /  AlkPhos  164<H>  01-15    PT/INR - ( 15 Catrachito 2019 10:10 )   PT: 12.7 SEC;   INR: 1.11          PTT - ( 15 Catrachito 2019 10:10 )  PTT:28.6 SEC    Patient / Family aware of procedure:   [x] Y   [  ] N

## 2019-01-15 NOTE — ED PROVIDER NOTE - MEDICAL DECISION MAKING DETAILS
67 y/o F with no known pmhx sent in by infectious disease for hepatic abscess seen outpatient CT, reviewed in PACS. Patient is well appearing and afebrile. No repeat CT as it was performed 3 days ago. labs, IV abx, admit 67 y/o F with no known pmhx sent in by infectious disease for hepatic abscess seen outpatient CT, reviewed in PACS. Patient is well appearing and afebrile. No repeat CT as it was performed 3 days ago. labs, us, IV abx, admit

## 2019-01-15 NOTE — ED PROVIDER NOTE - PMH
Grief at loss of child  Intermittent, s/p loss of child ' 2013  Hypertension  Borderline. No meds  Obesity  BMI:  34.7

## 2019-01-15 NOTE — H&P ADULT - HISTORY OF PRESENT ILLNESS
67 yo F with no pertinent medical history presenting w/ liver abscess noted on outpatient CT. 67 yo F with no pertinent medical history presenting w/ liver abscess noted on outpatient CT. Patient traveled to [place] one month ago where she developed abdominal pain 69 yo F with no pertinent medical history presenting w/ liver abscess noted on outpatient CT. Patient traveled to [place] one month ago where she developed intermittent abdominal pain and fevers. Pt followed up w/ PMD on 1/2 and she was found to have a WBC count of 19. She then saw ID (Dr. Paul) on 1/7 and a CT A/P was ordered. CT showed numerous hypodense hepatic lesions (largest 11.1 cm) with mild pericholecystic stranding and wall thickening. Pt was told to go to the ER given the CT findings.     In the ED, VS Temp 97.4, HR 95, /102, RR 16 SpO2 100. WBCs 19. alk phos 164, AST 50, ALT 15. Pt s/p ceftriaxone 2g, metronidazole 500 mg, zosyn x1, 2 L NS. 67 yo F with no medical history presenting w/ liver mass noted on outpatient CT. Patient traveled to [facility] one month ago where she developed intermittent abdominal pain and fevers. Pt states that the pain is waxing/waning, dull, diffuse, 3/10 in intensity, and with no clear inciting factors. Pt states that her fevers were also intermittent w/ the highest fever at 103F but she has had no fevers/chills over the past week. Pt states she lost 15 pounds over the last month 2/2 decreased appetite. Pt followed up w/ PMD on 1/2 and she was found to have a WBC count of 19. She then saw ID (Dr. Paul) on 1/7 and a CT A/P was ordered. CT showed numerous hypodense hepatic lesions (largest 11.1 cm) with mild pericholecystic stranding and wall thickening. Pt was told to go to the ER given the CT findings. She denies cp, sob, n/v, diarrhea, URI sx, dysuria, night sweats. Pt endorses no prior hx of hepatitis, GI disorders. In terms of her travel hx, she stayed in the city ([place]), had no interactions with livestock, and drank only bottled water. No sick contacts.     In the ED, VS Temp 97.4, HR 95, /102, RR 16 SpO2 100. WBCs 19. alk phos 164, AST 50, ALT 15. Pt s/p ceftriaxone 2g, metronidazole 500 mg, zosyn x1, 2 L NS. 69 yo F with no medical history presenting w/ liver mass noted on outpatient CT. Patient traveled to [facility] one month ago where she developed intermittent abdominal pain and fevers. Pt states that the pain is waxing/waning, dull, diffuse, 3/10 in intensity, and with no clear inciting factors. Pt states that her fevers were also intermittent w/ the highest fever at 103F but she has had no fevers/chills over the past week. Pt states she lost 15 pounds over the last month 2/2 decreased appetite. Pt followed up w/ PMD on 1/2 and she was found to have a WBC count of 19. She then saw ID (Dr. Paul) on 1/7 and a CT A/P was ordered. CT showed numerous hypodense hepatic lesions (largest 11.1 cm) with mild pericholecystic stranding and wall thickening. Pt was told to go to the ER given the CT findings. She denies cp, sob, n/v, diarrhea, URI sx, dysuria, night sweats. Pt endorses no prior hx of hepatitis, GI disorders. In terms of her travel hx, she stayed in the city ([place]), had no interactions with livestock, and drank only bottled water. No sick contacts. Pt states that she has has a colonoscopy within the last 4 years that was normal and a recent mammo that was also normal.     In the ED, VS Temp 97.4, HR 95, /102, RR 16 SpO2 100. WBCs 19. alk phos 164, AST 50, ALT 15. Pt s/p ceftriaxone 2g, metronidazole 500 mg, zosyn x1, 2 L NS.

## 2019-01-15 NOTE — H&P ADULT - PROBLEM SELECTOR PLAN 3
Right adnexal dermoid ill-defined hypodensity of the left uterine body and fundal myometrium and fluid extending from the left adnexa visualized on outpt CT.  - will consider pelvic sono

## 2019-01-15 NOTE — H&P ADULT - NSHPLABSRESULTS_GEN_ALL_CORE
LABS:                         12.3   19.00 )-----------( 386      ( 15 Catrachito 2019 10:10 )             37.5     01-15    134<L>  |  97<L>  |  7   ----------------------------<  91  4.1   |  26  |  0.46<L>    Ca    9.9      15 Catrachito 2019 10:10    TPro  7.9  /  Alb  3.7  /  TBili  0.6  /  DBili  x   /  AST  50<H>  /  ALT  15  /  AlkPhos  164<H>  01-15    PT/INR - ( 15 Catrachito 2019 10:10 )   PT: 12.7 SEC;   INR: 1.11          PTT - ( 15 Catrachito 2019 10:10 )  PTT:28.6 SEC          RADIOLOGY, EKG & ADDITIONAL TESTS: Reviewed.    EXAM:  CT ABDOMEN AND PELVIS OC IC        PROCEDURE DATE:  01/12/2019           INTERPRETATION:  CLINICAL INFORMATION: Three weeks of fever, weight loss,   and abdominal pain.      COMPARISON: None.    PROCEDURE:   CT of the Abdomen and Pelvis was performed with intravenous contrast.   Intravenous contrast: 90 ml Omnipaque 350. 10 ml discarded.  Oral contrast: positive contrast was administered.  Sagittal and coronal reformats were performed.    FINDINGS:    LOWER CHEST: Within normal limits.    LIVER: There are multiple low-attenuation lesions throughout the liver   with largest lesion/conglomerate lesion centered in the right hepatic   lobe measuring 11.1 x 9.0 cm.  BILE DUCTS: There is mild dilatation of the common bile duct to 0.9 cm.   No obstructing lesion visualized.  GALLBLADDER: Mild pericholecystic inflammation appreciated.  SPLEEN: Within normal limits.  PANCREAS: Within normal limits.  ADRENALS: Within normal limits.  KIDNEYS/URETERS: Within normal limits.    BLADDER: Within normal limits.  REPRODUCTIVE ORGANS: There is a 5.4 x 3.4 cm mixed density right adnexal   mass. This mass includes fat density as well as foci of air, most   suggestive of a dermoid. There is ill-defined low attenuation the left   uterine fundus and body, inseparable from the left adnexa which is not   well-defined. A small amount of fluid is seen in the pelvis and   surrounding the left adnexa.    BOWEL: No bowel obstruction or inflammation is appreciated. There is a   fluid and air containing periampullary duodenal diverticulum. The   appendix is within normal limits.  PERITONEUM: Small amount of ascites.  VESSELS: The main portal vein, superior mesenteric vein, and splenic vein   are patent. The abdominal aorta is normal in caliber.  RETROPERITONEUM: There is a 1.4 x 2.0 cm aortocaval lymph node (series 2,   image 38). There is a 1.6 x 1.2 cm left para-aortic lymph node (series 2,   image 38). Thelma hepatis lymphadenopathy is appreciated including a 4.1 x   1.9 cm thelma hepatis lymph node (series 2, image 36). There is a 1.0 x   1.0 cm right cardiophrenic angle lymph node. No lymphadenopathy.    ABDOMINAL WALL: Within normal limits.  BONES: There are degenerative changes in the spine.    IMPRESSION:   Numerous hypodense hepatic lesions with the largest lesion measuring 11.1   cm. Both infectious and neoplastic etiologies are included in the   differential diagnosis.    Mild pericholecystic stranding and wall thickening. No gallstones   visualized. Findings are thought likely reactive to the adjacent liver   disease. Clinical correlation recommended. If there is concern for acute   cholecystitis, further evaluation with abdominal ultrasound and/or HIDA   scan can be performed.    Right adnexal dermoid ill-defined hypodensity of the left uterine body   and fundal myometrium and fluid extending from the left adnexa. Further   evaluation with pelvic sonography is suggested.

## 2019-01-15 NOTE — H&P ADULT - NSHPPHYSICALEXAM_GEN_ALL_CORE
PHYSICAL EXAM:  GENERAL: NAD, well-groomed, well-developed  HEAD:  Atraumatic, Normocephalic  EYES: EOMI, PERRLA, conjunctiva and sclera clear  ENMT: No tonsillar erythema, exudates, or enlargement; Moist mucous membranes  NECK: Supple, No JVD, Normal thyroid  HEART: Regular rate and rhythm; No murmurs, rubs, or gallops  RESPIRATORY: CTA B/L, No W/R/R  ABDOMEN: Soft, Nontender, Nondistended; Bowel sounds present  NEUROLOGY: A&Ox3, nonfocal, moving all extremities  EXTREMITIES:  2+ Peripheral Pulses, No clubbing, cyanosis, or edema  SKIN: warm, dry, normal color, no rash or abnormal lesions

## 2019-01-15 NOTE — CONSULT NOTE ADULT - SUBJECTIVE AND OBJECTIVE BOX
HPI:  68 f with no PMH originally from [place], lives in the , 4 weeks ago came back from a month trip to [place] developed fever and abd pain there and when came back the fever went to 103 with loss of appetite, fatigue and weight loss, was seen by PMD and was referred to ID clinic for leukocytosis 19 and fever. The outpatient work ups showed again WBC 19, negative blood culture, quantiferon, brucella, stool O&P and PCR but the chest/abd CT showed numerous hypodensity in the liver, the largest 11 cm s/o infectious or neoplastic etiology, so pt was referred to the hospital    PAST MEDICAL & SURGICAL HISTORY:  Obesity: BMI:  34.7  Grief at loss of child: Intermittent, s/p loss of child &#x27; 2013  Hypertension: Borderline. No meds  S/P tooth extraction: &#x27; 2011      Allergies    aspirin (Stomach Upset)    Intolerances        ANTIMICROBIALS:  piperacillin/tazobactam IVPB. 3.375 once      OTHER MEDS:      SOCIAL HISTORY:    from [facility], lives in the , , lives with , no smoking, alcohol or drug abuse    FAMILY HISTORY:  no h/o TB in parents or siblings  brother has hepatitis B    ROS:     All other systems negative     Constitutional: no fever, no chills, improving appetite  Eye: no eye pain, no redness, no vision changes  ENT:  no sore throat, no rhinorrhea  Cardiovascular:  no chest pain, no palpitation  Respiratory:  no SOB, no cough  GI:  intermittent abd pain, no vomiting, no diarrhea  urinary: no dysuria, no hematuria, no flank pain  : no  discharge or bleeding  musculoskeletal:  no joint pain, no joint swelling  skin:  no rash  neurology:  no headache, no seizure, no change in mental status  psych: no anxiety, no depression     Physical Exam:    General:    NAD, non toxic  Head: atraumatic, normocephalic  Eyes: normal sclera and conjunctiva  ENT:   no oropharyngeal lesions, no LAD, neck supple  Cardio:    regular S1,S2, no murmur  Respiratory:   clear b/l, no wheezing  abd:   soft, BS +, not tender, no hepatosplenomegaly  :     no CVAT, no suprapubic tenderness, no grady  Musculoskeletal : no joint swelling, no edema  Skin:    no rash  vascular: no lines, normal pulses  Neurologic:     no focal deficits  psych: normal affect, no suicidal ideation      Drug Dosing Weight  Height (cm): 152.4 (15 Catrachito 2019 12:59)    Vital Signs Last 24 Hrs  T(F): 98 (01-15-19 @ 12:59), Max: 98.4 (01-15-19 @ 10:21)    Vital Signs Last 24 Hrs  HR: 85 (01-15-19 @ 12:59) (77 - 95)  BP: 146/71 (01-15-19 @ 12:59) (136/76 - 150/102)  RR: 18 (01-15-19 @ 12:59)  SpO2: 98% (01-15-19 @ 12:59) (93% - 100%)  Wt(kg): --                          12.3   19.00 )-----------( 386      ( 15 Catrachito 2019 10:10 )             37.5       01-15    134<L>  |  97<L>  |  7   ----------------------------<  91  4.1   |  26  |  0.46<L>    Ca    9.9      15 Catrachito 2019 10:10    TPro  7.9  /  Alb  3.7  /  TBili  0.6  /  DBili  x   /  AST  50<H>  /  ALT  15  /  AlkPhos  164<H>  01-15          MICROBIOLOGY:  v              RADIOLOGY:    Images below reviewed personally  < from: CT Abdomen and Pelvis w/ Oral Cont and w/ IV Cont (01.12.19 @ 14:11) >    IMPRESSION:   Numerous hypodense hepatic lesions with the largest lesion measuring 11.1   cm. Both infectious and neoplastic etiologies are included in the   differential diagnosis.    Mild pericholecystic stranding and wall thickening. No gallstones   visualized. Findings are thought likely reactive to the adjacent liver   disease. Clinical correlation recommended. If there is concern for acute   cholecystitis, further evaluation with abdominal ultrasound and/or HIDA   scan can be performed.    Right adnexal dermoid ill-defined hypodensity of the left uterine body   and fundal myometrium and fluid extending from the left adnexa. Further   evaluation with pelvic sonography is suggested.

## 2019-01-15 NOTE — CONSULT NOTE ADULT - ASSESSMENT
68 f with no PMH originally from [place], lives in the [place], 4 weeks ago came back from a month trip to [place] developed fever and abd pain there and when came back the fever went to 103 with loss of appetite, fatigue and weight loss, was seen by PMD and was referred to ID clinic for leukocytosis 19 and fever. The outpatient work ups showed again WBC 19, negative blood culture, quantiferon, brucella, stool O&P and PCR but the chest/abd CT showed numerous hypodensity in the liver, the largest 11 cm s/o infectious or neoplastic etiology, so pt was referred to the hospital    fever, leukocytosis, weight loss with liver lesion, ?abscess vs neoplasm    * f/u the blood culture  * c/w zosyn 3.375 q 8 for now  * surgery and IR eval for drainage  * will need bacterial, fungal and AFB cultures of the abscess  * malignancy work up as per the primary team

## 2019-01-15 NOTE — H&P ADULT - PROBLEM SELECTOR PLAN 2
Pt w/ multiple hepatic abscesses visualized on outpatient CT A/P. Pt w/ multiple hepatic abscesses visualized on outpatient CT A/P. Concern for infection/abscess vs malignancy.   - c/w zosyn  - will f/u AFP, CEA, CA 19, TB, hepatitis panel  - f/u IR re drainage Pt w/ multiple hepatic abscesses visualized on outpatient CT A/P. Concern for infection/abscess vs malignancy.   - c/w zosyn  - will f/u AFP, CEA, CA 19, TB, hepatitis panel  - f/u IR re drainage  - will need bacterial, fungal and AFB cultures if abscess

## 2019-01-15 NOTE — ED ADULT TRIAGE NOTE - CHIEF COMPLAINT QUOTE
sent by PMD for evaluation of liver abscess noted on CT scan performed 01/12/19.  Patient has mild abdominal discomfort, denies any N/V/D.  Denies any PMH.

## 2019-01-15 NOTE — ED ADULT NURSE NOTE - NSIMPLEMENTINTERV_GEN_ALL_ED
Implemented All Universal Safety Interventions:  [place] to call system. Call bell, personal items and telephone within reach. Instruct patient to call for assistance. Room bathroom lighting operational. Non-slip footwear when patient is off stretcher. Physically safe environment: no spills, clutter or unnecessary equipment. Stretcher in lowest position, wheels locked, appropriate side rails in place.

## 2019-01-15 NOTE — ED ADULT NURSE NOTE - OBJECTIVE STATEMENT
Pt sent by PMD for evaluation of liver abcess-pt was visiting [place] one month ago and came back and developed abdominal pain and fevers--pt sent for CAT scan which showed liver abcess--Pt had #20 placed labs drawn and sent--blood cultures done.

## 2019-01-16 ENCOUNTER — RESULT REVIEW (OUTPATIENT)
Age: 69
End: 2019-01-16

## 2019-01-16 ENCOUNTER — MOBILE ON CALL (OUTPATIENT)
Age: 69
End: 2019-01-16

## 2019-01-16 LAB
AFP-TM SERPL-MCNC: 1.9 NG/ML — SIGNIFICANT CHANGE UP
ALBUMIN SERPL ELPH-MCNC: 3.2 G/DL — LOW (ref 3.3–5)
ALP SERPL-CCNC: 149 U/L — HIGH (ref 40–120)
ALT FLD-CCNC: 14 U/L — SIGNIFICANT CHANGE UP (ref 4–33)
ANION GAP SERPL CALC-SCNC: 12 MEQ/L — SIGNIFICANT CHANGE UP (ref 7–14)
AST SERPL-CCNC: 45 U/L — HIGH (ref 4–32)
BILIRUB SERPL-MCNC: 0.5 MG/DL — SIGNIFICANT CHANGE UP (ref 0.2–1.2)
BUN SERPL-MCNC: 4 MG/DL — LOW (ref 7–23)
CALCIUM SERPL-MCNC: 9.4 MG/DL — SIGNIFICANT CHANGE UP (ref 8.4–10.5)
CEA SERPL-MCNC: 1.6 NG/ML — SIGNIFICANT CHANGE UP (ref 1–3.8)
CHLORIDE SERPL-SCNC: 100 MMOL/L — SIGNIFICANT CHANGE UP (ref 98–107)
CO2 SERPL-SCNC: 22 MMOL/L — SIGNIFICANT CHANGE UP (ref 22–31)
CREAT SERPL-MCNC: 0.43 MG/DL — LOW (ref 0.5–1.3)
GLUCOSE SERPL-MCNC: 88 MG/DL — SIGNIFICANT CHANGE UP (ref 70–99)
HAV IGM SER-ACNC: NONREACTIVE — SIGNIFICANT CHANGE UP
HBV CORE IGM SER-ACNC: NONREACTIVE — SIGNIFICANT CHANGE UP
HBV SURFACE AG SER-ACNC: NONREACTIVE — SIGNIFICANT CHANGE UP
HCT VFR BLD CALC: 37.2 % — SIGNIFICANT CHANGE UP (ref 34.5–45)
HCV AB S/CO SERPL IA: 0.41 S/CO — SIGNIFICANT CHANGE UP
HCV AB SERPL-IMP: SIGNIFICANT CHANGE UP
HGB BLD-MCNC: 12.2 G/DL — SIGNIFICANT CHANGE UP (ref 11.5–15.5)
MAGNESIUM SERPL-MCNC: 1.9 MG/DL — SIGNIFICANT CHANGE UP (ref 1.6–2.6)
MCHC RBC-ENTMCNC: 28.5 PG — SIGNIFICANT CHANGE UP (ref 27–34)
MCHC RBC-ENTMCNC: 32.8 % — SIGNIFICANT CHANGE UP (ref 32–36)
MCV RBC AUTO: 86.9 FL — SIGNIFICANT CHANGE UP (ref 80–100)
NRBC # FLD: 0 K/UL — LOW (ref 25–125)
PHOSPHATE SERPL-MCNC: 3.5 MG/DL — SIGNIFICANT CHANGE UP (ref 2.5–4.5)
PLATELET # BLD AUTO: 366 K/UL — SIGNIFICANT CHANGE UP (ref 150–400)
PMV BLD: 9.7 FL — SIGNIFICANT CHANGE UP (ref 7–13)
POTASSIUM SERPL-MCNC: 4.2 MMOL/L — SIGNIFICANT CHANGE UP (ref 3.5–5.3)
POTASSIUM SERPL-SCNC: 4.2 MMOL/L — SIGNIFICANT CHANGE UP (ref 3.5–5.3)
PROT SERPL-MCNC: 6.7 G/DL — SIGNIFICANT CHANGE UP (ref 6–8.3)
RBC # BLD: 4.28 M/UL — SIGNIFICANT CHANGE UP (ref 3.8–5.2)
RBC # FLD: 13.3 % — SIGNIFICANT CHANGE UP (ref 10.3–14.5)
SODIUM SERPL-SCNC: 134 MMOL/L — LOW (ref 135–145)
WBC # BLD: 18.67 K/UL — HIGH (ref 3.8–10.5)
WBC # FLD AUTO: 18.67 K/UL — HIGH (ref 3.8–10.5)

## 2019-01-16 PROCEDURE — 99233 SBSQ HOSP IP/OBS HIGH 50: CPT | Mod: GC

## 2019-01-16 PROCEDURE — 77012 CT SCAN FOR NEEDLE BIOPSY: CPT | Mod: 26

## 2019-01-16 PROCEDURE — 88360 TUMOR IMMUNOHISTOCHEM/MANUAL: CPT | Mod: 26,59

## 2019-01-16 PROCEDURE — 88305 TISSUE EXAM BY PATHOLOGIST: CPT | Mod: 26

## 2019-01-16 PROCEDURE — 88342 IMHCHEM/IMCYTCHM 1ST ANTB: CPT | Mod: 26,59

## 2019-01-16 PROCEDURE — 93010 ELECTROCARDIOGRAM REPORT: CPT

## 2019-01-16 PROCEDURE — 88173 CYTOPATH EVAL FNA REPORT: CPT | Mod: 26

## 2019-01-16 PROCEDURE — 99233 SBSQ HOSP IP/OBS HIGH 50: CPT

## 2019-01-16 PROCEDURE — 88307 TISSUE EXAM BY PATHOLOGIST: CPT | Mod: 26

## 2019-01-16 PROCEDURE — 88341 IMHCHEM/IMCYTCHM EA ADD ANTB: CPT | Mod: 26,59

## 2019-01-16 PROCEDURE — 47000 NEEDLE BIOPSY OF LIVER PERQ: CPT

## 2019-01-16 RX ORDER — PIPERACILLIN AND TAZOBACTAM 4; .5 G/20ML; G/20ML
3.38 INJECTION, POWDER, LYOPHILIZED, FOR SOLUTION INTRAVENOUS EVERY 8 HOURS
Qty: 0 | Refills: 0 | Status: DISCONTINUED | OUTPATIENT
Start: 2019-01-16 | End: 2019-01-17

## 2019-01-16 RX ORDER — PIPERACILLIN AND TAZOBACTAM 4; .5 G/20ML; G/20ML
3.38 INJECTION, POWDER, LYOPHILIZED, FOR SOLUTION INTRAVENOUS EVERY 8 HOURS
Qty: 0 | Refills: 0 | Status: DISCONTINUED | OUTPATIENT
Start: 2019-01-16 | End: 2019-01-16

## 2019-01-16 RX ADMIN — PIPERACILLIN AND TAZOBACTAM 25 GRAM(S): 4; .5 INJECTION, POWDER, LYOPHILIZED, FOR SOLUTION INTRAVENOUS at 17:00

## 2019-01-16 RX ADMIN — PIPERACILLIN AND TAZOBACTAM 25 GRAM(S): 4; .5 INJECTION, POWDER, LYOPHILIZED, FOR SOLUTION INTRAVENOUS at 09:07

## 2019-01-16 NOTE — PROGRESS NOTE ADULT - PROBLEM SELECTOR PLAN 2
Pt w/ multiple hepatic abscesses visualized on outpatient CT A/P. Concern for infection/abscess vs malignancy.   - c/w zosyn  - will f/u AFP, CEA, CA 19, TB, hepatitis panel  - f/u IR re drainage  - will need bacterial, fungal and AFB cultures if abscess

## 2019-01-16 NOTE — PROGRESS NOTE ADULT - SUBJECTIVE AND OBJECTIVE BOX
Interventional Radiology Post Procedure Note:    Pre procedure diagnosis: Liver mass   Post procedure diagnosis: Liver mass  Indications for procedure: New liver lesion on CT   Procedure performed: CT guided liver biopsy  (s): Oniel Snowden  Assistant(s): Mehrdad Rojas  Anesthesia type: local - 1% lidocaine   Sedation type: Sedation with provided by anesthesiologist   Closure: biosentry   Implants: none   Aspirate: no fluid   Specimen: core biopsy x3   Estimated Blood Loss: minimal   Contrast administered: none   Complications: no immediate   Disposition: to IRS then to floor   Condition: Stable   Findings: core biopsy x 3  Plan: Bed rest x4 hours. Follow up cytopathology results     Please call Interventional Radiology with any questions, concerns, or issues.

## 2019-01-16 NOTE — PROGRESS NOTE ADULT - ASSESSMENT
69 yo F with no known medical history presenting w/ liver mass noted on outpatient CT in the setting of intermittent dull, diffuse abdominal pain x1 month and recent travel to [place].

## 2019-01-16 NOTE — PROGRESS NOTE ADULT - SUBJECTIVE AND OBJECTIVE BOX
Follow Up:  fever, liver abscess    Interval History: pt afebrile now, WBC still high but went down to 18, pending IR drain    ROS:      All other systems negative    Constitutional: no fever, no chills  Head: no trauma  Eyes: no vision changes, no eye pain  ENT:  no sore throat, no rhinorrhea  Cardiovascular:  no chest pain, no palpitation  Respiratory:  no SOB, no cough  GI:  intermittent abd pain, no vomiting, no diarrhea  urinary: no dysuria, no hematuria, no flank pain  musculoskeletal:  no joint pain, no joint swelling  skin:  no rash  neurology:  no headache, no seizure, no change in mental status  psych: no anxiety, no depression         Allergies  aspirin (Stomach Upset)        ANTIMICROBIALS:  piperacillin/tazobactam IVPB. 3.375 every 8 hours      OTHER MEDS:  acetaminophen   Tablet .. 650 milliGRAM(s) Oral every 6 hours PRN      Vital Signs Last 24 Hrs  T(C): 37.2 (16 Jan 2019 06:15), Max: 37.2 (16 Jan 2019 06:15)  T(F): 98.9 (16 Jan 2019 06:15), Max: 98.9 (16 Jan 2019 06:15)  HR: 84 (16 Jan 2019 06:15) (77 - 85)  BP: 142/79 (16 Jan 2019 06:15) (133/75 - 146/71)  BP(mean): --  RR: 18 (16 Jan 2019 06:15) (17 - 18)  SpO2: 99% (16 Jan 2019 06:15) (93% - 100%)    Physical Exam:  General:    NAD,  non toxic, A&O x 3  Head: atraumatic, normocephalic  Eye: normal sclera and conjunctiva  ENT:    no oropharyngeal lesions,   no LAD,   neck supple  Cardio:     regular S1, S2,  no murmur  Respiratory:    clear b/l,    no wheezing  abd:     soft,   BS +,   no tenderness,    no organomegaly  :   no CVAT,  no suprapubic tenderness,   no  grady  Musculoskeletal:   no joint swelling,   no edema  vascular: no lines, normal pulses  Skin:    no rash  Neurologic:     no focal deficit  psych: normal affect, no suicidal ideation                          12.2   18.67 )-----------( 366      ( 16 Jan 2019 05:30 )             37.2       01-16    134<L>  |  100  |  4<L>  ----------------------------<  88  4.2   |  22  |  0.43<L>    Ca    9.4      16 Jan 2019 05:30  Phos  3.5     01-16  Mg     1.9     01-16    TPro  6.7  /  Alb  3.2<L>  /  TBili  0.5  /  DBili  x   /  AST  45<H>  /  ALT  14  /  AlkPhos  149<H>  01-16          MICROBIOLOGY:  v            RADIOLOGY:  Images below reviewed personally  < from: CT Abdomen and Pelvis w/ Oral Cont and w/ IV Cont (01.12.19 @ 14:11) >  IMPRESSION:   Numerous hypodense hepatic lesions with the largest lesion measuring 11.1   cm. Both infectious and neoplastic etiologies are included in the   differential diagnosis.    Mild pericholecystic stranding and wall thickening. No gallstones   visualized. Findings are thought likely reactive to the adjacent liver   disease. Clinical correlation recommended. If there is concern for acute   cholecystitis, further evaluation with abdominal ultrasound and/or HIDA   scan can be performed.    Right adnexal dermoid ill-defined hypodensity of the left uterine body   and fundal myometrium and fluid extending from the left adnexa. Further   evaluation with pelvic sonography is suggested.    The findings were discussed with Dr. Paul on 1/14/2019 at 3:37 p.m.

## 2019-01-16 NOTE — PROGRESS NOTE ADULT - ATTENDING COMMENTS
Patient seen and examined by me. Case discussed with resident and agree with the resident's findings and plan as documented in the resident's note. 68F with recent trip to [place] with no known medical history presenting w/ liver mass noted on outpatient CT in the setting of intermittent dull, diffuse abdominal pain x1 month, fevers to 103, loss of appetite, fatigue and weight loss with recent travel to [place] 4 weeks ago. Per ID notes, outpatient work ups showed again WBC 19, negative blood culture, quantiferon, brucella, stool O&P and PCR.    1. Hepatic lesions:   -f/u with surgery and IR  for drainage and biopsy  - will need bacterial, fungal and AFB cultures of the abscess  - CEA normal; f/u  and hepatitis panel    2. +SIRS criteria unclear source:  -f/u the blood culture  -c/w zosyn 3.375 q 8 for now, started 1/15, now day 2  -f/u cultures of abscess    3. Adnexal mass:  -will consider OBGYN consult for possible pelvix/transvaginal ultrasound if necessary

## 2019-01-16 NOTE — PROGRESS NOTE ADULT - PROBLEM SELECTOR PLAN 1
Pt w/ HR>90, leukocytosis of 19. Found to have to have multiple hepatic lesions, largest 11.1 cm. DDx includes infectious and neoplastic etiology.  - will c/w zosyn  - ID following  - will c/s IR, will determine whether mass is drainable  - f/u Bcxs

## 2019-01-16 NOTE — PROGRESS NOTE ADULT - ASSESSMENT
68 f with no PMH originally from [place], [place], 4 weeks ago came back from a month trip to [place] developed fever and abd pain there and when came back the fever went to 103 with loss of appetite, fatigue and weight loss, was seen by PMD and was referred to ID clinic for leukocytosis 19 and fever. The outpatient work ups showed again WBC 19, negative blood culture, quantiferon, brucella, stool O&P and PCR but the chest/abd CT showed numerous hypodensity in the liver, the largest 11 cm s/o infectious or neoplastic etiology, so pt was referred to the hospital    fever, leukocytosis, weight loss with liver lesion, ?abscess vs neoplasm    * f/u the blood culture  * c/w zosyn 3.375 q 8 for now, started 1/15, now day 2  * f/u with surgery and IR  for drainage and biopsy  * will need bacterial, fungal and AFB cultures of the abscess  * malignancy work up as per the primary team

## 2019-01-17 ENCOUNTER — TRANSCRIPTION ENCOUNTER (OUTPATIENT)
Age: 69
End: 2019-01-17

## 2019-01-17 LAB
ALBUMIN SERPL ELPH-MCNC: 3.3 G/DL — SIGNIFICANT CHANGE UP (ref 3.3–5)
ALP SERPL-CCNC: 152 U/L — HIGH (ref 40–120)
ALT FLD-CCNC: 13 U/L — SIGNIFICANT CHANGE UP (ref 4–33)
ANION GAP SERPL CALC-SCNC: 13 MMO/L — SIGNIFICANT CHANGE UP (ref 7–14)
AST SERPL-CCNC: 49 U/L — HIGH (ref 4–32)
BILIRUB SERPL-MCNC: 0.6 MG/DL — SIGNIFICANT CHANGE UP (ref 0.2–1.2)
BUN SERPL-MCNC: 5 MG/DL — LOW (ref 7–23)
CALCIUM SERPL-MCNC: 9.2 MG/DL — SIGNIFICANT CHANGE UP (ref 8.4–10.5)
CANCER AG125 SERPL-ACNC: 21 U/ML — SIGNIFICANT CHANGE UP
CANCER AG19-9 SERPL-ACNC: 1122.2 U/ML — HIGH
CHLORIDE SERPL-SCNC: 99 MMOL/L — SIGNIFICANT CHANGE UP (ref 98–107)
CO2 SERPL-SCNC: 22 MMOL/L — SIGNIFICANT CHANGE UP (ref 22–31)
CREAT SERPL-MCNC: 0.47 MG/DL — LOW (ref 0.5–1.3)
GLUCOSE SERPL-MCNC: 91 MG/DL — SIGNIFICANT CHANGE UP (ref 70–99)
HCT VFR BLD CALC: 40.6 % — SIGNIFICANT CHANGE UP (ref 34.5–45)
HGB BLD-MCNC: 13 G/DL — SIGNIFICANT CHANGE UP (ref 11.5–15.5)
HIV 1+2 AB+HIV1 P24 AG SERPL QL IA: SIGNIFICANT CHANGE UP
MAGNESIUM SERPL-MCNC: 1.9 MG/DL — SIGNIFICANT CHANGE UP (ref 1.6–2.6)
MCHC RBC-ENTMCNC: 28.4 PG — SIGNIFICANT CHANGE UP (ref 27–34)
MCHC RBC-ENTMCNC: 32 % — SIGNIFICANT CHANGE UP (ref 32–36)
MCV RBC AUTO: 88.6 FL — SIGNIFICANT CHANGE UP (ref 80–100)
NRBC # FLD: 0 K/UL — LOW (ref 25–125)
PHOSPHATE SERPL-MCNC: 3.6 MG/DL — SIGNIFICANT CHANGE UP (ref 2.5–4.5)
PLATELET # BLD AUTO: 308 K/UL — SIGNIFICANT CHANGE UP (ref 150–400)
PMV BLD: 10.5 FL — SIGNIFICANT CHANGE UP (ref 7–13)
POTASSIUM SERPL-MCNC: 4 MMOL/L — SIGNIFICANT CHANGE UP (ref 3.5–5.3)
POTASSIUM SERPL-SCNC: 4 MMOL/L — SIGNIFICANT CHANGE UP (ref 3.5–5.3)
PROT SERPL-MCNC: 7 G/DL — SIGNIFICANT CHANGE UP (ref 6–8.3)
RBC # BLD: 4.58 M/UL — SIGNIFICANT CHANGE UP (ref 3.8–5.2)
RBC # FLD: 13.3 % — SIGNIFICANT CHANGE UP (ref 10.3–14.5)
SODIUM SERPL-SCNC: 134 MMOL/L — LOW (ref 135–145)
WBC # BLD: 17.49 K/UL — HIGH (ref 3.8–10.5)
WBC # FLD AUTO: 17.49 K/UL — HIGH (ref 3.8–10.5)

## 2019-01-17 PROCEDURE — 76830 TRANSVAGINAL US NON-OB: CPT | Mod: 26

## 2019-01-17 PROCEDURE — 99232 SBSQ HOSP IP/OBS MODERATE 35: CPT | Mod: GC

## 2019-01-17 PROCEDURE — 99233 SBSQ HOSP IP/OBS HIGH 50: CPT | Mod: GC

## 2019-01-17 RX ORDER — METRONIDAZOLE 500 MG
500 TABLET ORAL EVERY 12 HOURS
Qty: 0 | Refills: 0 | Status: DISCONTINUED | OUTPATIENT
Start: 2019-01-17 | End: 2019-01-18

## 2019-01-17 RX ORDER — ENOXAPARIN SODIUM 100 MG/ML
40 INJECTION SUBCUTANEOUS EVERY 24 HOURS
Qty: 0 | Refills: 0 | Status: DISCONTINUED | OUTPATIENT
Start: 2019-01-17 | End: 2019-01-18

## 2019-01-17 RX ORDER — METRONIDAZOLE 500 MG
500 TABLET ORAL EVERY 8 HOURS
Qty: 0 | Refills: 0 | Status: DISCONTINUED | OUTPATIENT
Start: 2019-01-17 | End: 2019-01-17

## 2019-01-17 RX ORDER — ACETAMINOPHEN 500 MG
1000 TABLET ORAL ONCE
Qty: 0 | Refills: 0 | Status: COMPLETED | OUTPATIENT
Start: 2019-01-17 | End: 2019-01-17

## 2019-01-17 RX ORDER — CIPROFLOXACIN LACTATE 400MG/40ML
500 VIAL (ML) INTRAVENOUS EVERY 12 HOURS
Qty: 0 | Refills: 0 | Status: DISCONTINUED | OUTPATIENT
Start: 2019-01-17 | End: 2019-01-18

## 2019-01-17 RX ADMIN — Medication 400 MILLIGRAM(S): at 22:59

## 2019-01-17 RX ADMIN — Medication 500 MILLIGRAM(S): at 17:19

## 2019-01-17 RX ADMIN — Medication 650 MILLIGRAM(S): at 20:17

## 2019-01-17 RX ADMIN — Medication 1000 MILLIGRAM(S): at 23:25

## 2019-01-17 RX ADMIN — Medication 650 MILLIGRAM(S): at 19:28

## 2019-01-17 RX ADMIN — PIPERACILLIN AND TAZOBACTAM 25 GRAM(S): 4; .5 INJECTION, POWDER, LYOPHILIZED, FOR SOLUTION INTRAVENOUS at 01:13

## 2019-01-17 NOTE — DIETITIAN INITIAL EVALUATION ADULT. - PERTINENT LABORATORY DATA
01-17 Na 134 mmol/L<L> Glu 91 mg/dL K+ 4.0 mmol/L Cr 0.47 mg/dL<L> BUN 5 mg/dL<L> Phos 3.6 mg/dL Alb 3.3 g/dL PAB n/a   Hgb 13.0 g/dL Hct 40.6 % HgA1C n/a    Glucose, Serum: 91 mg/dL

## 2019-01-17 NOTE — DIETITIAN INITIAL EVALUATION ADULT. - NS AS NUTRI INTERV MEALS SNACK
General/healthful diet/Recommend to change diet to Regular [employer] Vegetarian (accepts daily products only)

## 2019-01-17 NOTE — CHART NOTE - NSCHARTNOTEFT_GEN_A_CORE
S:  Patient's abdominal pain acutely increased to 7/10 and was not improved after nurse provided PO tyelnol. At bedside the patient states that the pain is more severe than it has been and is worst near the site of the procedure. She vomited x1 what appeared like the food she ate (denies hematemesis/coffee ground emesis). No nausea. No fevers, sweats, or chills.     O:  Vital Signs Last 24 Hrs  T(C): 37.1 (17 Jan 2019 22:06), Max: 37.1 (17 Jan 2019 22:06)  T(F): 98.8 (17 Jan 2019 22:06), Max: 98.8 (17 Jan 2019 22:06)  HR: 85 (17 Jan 2019 22:06) (79 - 87)  BP: 145/78 (17 Jan 2019 22:06) (143/65 - 150/80)  BP(mean): --  RR: 18 (17 Jan 2019 22:06) (17 - 18)  SpO2: 96% (17 Jan 2019 22:06) (96% - 100%)    PHYSICAL EXAM:  GENERAL: NAD, well-developed  HEAD:  Atraumatic, Normocephalic  EYES: EOMI, PERRLA, conjunctiva and sclera clear  NECK: Supple, No JVD  CHEST/LUNG: Clear to auscultation bilaterally; No wheeze  HEART: NL s1s2, regular rate and rhythm; No murmurs, rubs, or gallops  ABDOMEN: Soft, diffusely tender, but worst in the mid-epigastrum. Bandage is CDI; Bowel sounds present  EXTREMITIES:  2+ Peripheral Pulses, No clubbing, cyanosis, or edema  PSYCH: AAOx3  NEUROLOGY: non-focal  SKIN: No rashes or lesions    A/P    #Abdominal Pain 2/2 liver mass likely exacerbated by IR guided percutaneous biopsy  -1g IV Tylenol   -Reassess pain in 1hr  -Upright ABxray to eval for pneumoperitoneum if pain persists    Dr. Ortiz Fortune MD/MS  PGY-1 Internal Medicine  [facility]  Pager# [phone] ([facility])/[phone] ([facility])

## 2019-01-17 NOTE — DISCHARGE NOTE ADULT - CARE PROVIDERS DIRECT ADDRESSES
[email or web address]

## 2019-01-17 NOTE — DISCHARGE NOTE ADULT - PLAN OF CARE
Diagnosis, management You came to the hospital with liver lesions that were seen on outpatient CT imaging. You were also found to have an elevated white blood cell count on blood work. In the hospital, you were started on the antibiotic zosyn for treatment of a possible liver abscess with a slight decrease in your WBC count. You also underwent an IR biopsy of the liver lesion and multiple samples of tissue were obtained for testing. Please follow up with Dr. Paul from infectious disease for results of the IR biopsy. You came to the hospital with liver lesions that were seen on outpatient CT imaging. You were also found to have an elevated white blood cell count on blood work. In the hospital, you were started on the antibiotic zosyn for treatment of a possible liver abscess with a slight decrease in your WBC count. You also underwent an IR biopsy of the liver lesion and multiple samples of tissue were obtained for testing. Please follow up with Dr. Paul from infectious disease for results of the IR biopsy and make an appointment at the [facility]. Since we do not suspect infection at this point, you will be discharged off antibiotics.

## 2019-01-17 NOTE — DIETITIAN INITIAL EVALUATION ADULT. - PERTINENT MEDS FT
acetaminophen   Tablet .. PRN  ciprofloxacin     Tablet  enoxaparin Injectable  metroNIDAZOLE    Tablet

## 2019-01-17 NOTE — DIETITIAN INITIAL EVALUATION ADULT. - ORAL INTAKE PTA
Patient reports poor appetite/PO intake x 3 weeks PTA. Patient was eating small portions of home cooked meals. States she "didn't feel like eating"/poor

## 2019-01-17 NOTE — DISCHARGE NOTE ADULT - CARE PROVIDER_API CALL
Cammy Paul), Internal Medicine  [address] [place] [address]  Phone: [phone]  Fax: [phone] Cammy Paul), Internal Medicine  [address] [place] [address]  Phone: [phone]  Fax: [phone]    Lanny Scruggs (), HematologyOncology; Internal Medicine  [address] [place] [address]  Phone: [phone]  Fax: [phone]

## 2019-01-17 NOTE — DIETITIAN INITIAL EVALUATION ADULT. - OTHER INFO
Patient seen for nutrition consult for unintentional weight loss prior to admission. Per chart review patient with no significant medical history presenting with liver mass noted on outpatient CT. Patient reports decreased PO intake x 1 month due to loss of appetite and abdominal pain. NKFA. Patient follows vegetarian diet at home. No GI distress (nausea/vomiting/diarrhea/constipation) noted at this time. Patient endorses weight loss due to poor PO intake - significant 11% weight loss in 1 month. Patient reports feeling better today. Eating small amounts of meals in-house (food not typically what she eats at home). Encouraged PO intake as tolerated, consumption of nutrient and protein dense foods and small frequent meals.

## 2019-01-17 NOTE — PROGRESS NOTE ADULT - ATTENDING COMMENTS
Patient seen and examined by me. Case discussed with resident and agree with the resident's findings and plan as documented in the resident's note. 68F with recent trip to [place] with no known medical history presenting w/ liver mass noted on outpatient CT in the setting of intermittent dull, diffuse abdominal pain x1 month, fevers to 103, loss of appetite, fatigue and weight loss with recent travel to [place] 4 weeks ago. Per ID notes, outpatient work ups showed again WBC 19, negative blood culture, quantiferon, brucella, stool O&P and PCR.    1. Hepatic lesions:   - f/u IR biopsy results; concern for malignancy on initial appearance  - f/u bacterial, fungal and AFB cultures of the abscess  - CEA,  normal  - hepatitis panel normal    2. +SIRS criteria unclear source - resolved:  - Blood culture negative to date  - will monitor for 24hrs on cipro/flagyl PO to complete a 10 day course  - case discussed with ID who will f/u with patient closely as an outpatient    3. Adnexal mass:  - will order transvaginal ultrasound    4. Hyponatremia:  -likely 2/2 to dehydration  -encourage PO hydration    5. DVT px:  -now with concern for malignancy, recommend lovenox subq daily

## 2019-01-17 NOTE — PROGRESS NOTE ADULT - SUBJECTIVE AND OBJECTIVE BOX
Follow Up:  fever, liver abscess    Interval History: pt afebrile now, WBC still high but went down to 17, s/p IR but no purulence was drained and it was solid tissue    ROS:      All other systems negative    Constitutional: no fever, no chills  Head: no trauma  Eyes: no vision changes, no eye pain  ENT:  no sore throat, no rhinorrhea  Cardiovascular:  no chest pain, no palpitation  Respiratory:  no SOB, no cough  GI:  intermittent abd pain, no vomiting, no diarrhea  urinary: no dysuria, no hematuria, no flank pain  musculoskeletal:  no joint pain, no joint swelling  skin:  no rash  neurology:  no headache, no seizure, no change in mental status  psych: no anxiety, no depression           Allergies  aspirin (Stomach Upset)        ANTIMICROBIALS:  ciprofloxacin     Tablet 500 every 12 hours  metroNIDAZOLE    Tablet 500 every 8 hours      OTHER MEDS:  acetaminophen   Tablet .. 650 milliGRAM(s) Oral every 6 hours PRN  enoxaparin Injectable 40 milliGRAM(s) SubCutaneous every 24 hours      Vital Signs Last 24 Hrs  T(C): 36.6 (17 Jan 2019 05:32), Max: 36.8 (16 Jan 2019 21:44)  T(F): 97.8 (17 Jan 2019 05:32), Max: 98.2 (16 Jan 2019 21:44)  HR: 79 (17 Jan 2019 05:32) (79 - 84)  BP: 143/65 (17 Jan 2019 05:32) (130/66 - 143/65)  BP(mean): --  RR: 17 (17 Jan 2019 05:32) (17 - 18)  SpO2: 97% (17 Jan 2019 05:32) (97% - 99%)    Physical Exam:  General:    NAD,  non toxic, A&O x 3  Head: atraumatic, normocephalic  Eye: normal sclera and conjunctiva  ENT:    no oropharyngeal lesions,   no LAD,   neck supple  Cardio:     regular S1, S2,  no murmur  Respiratory:    clear b/l,    no wheezing  abd:     soft,   BS +,   no tenderness,    no organomegaly  :   no CVAT,  no suprapubic tenderness,   no  grady  Musculoskeletal:   no joint swelling,   no edema  vascular: no lines, normal pulses  Skin:    no rash  Neurologic:     no focal deficit  psych: normal affect, no suicidal ideation                          13.0   17.49 )-----------( 308      ( 17 Jan 2019 05:29 )             40.6       01-17    134<L>  |  99  |  5<L>  ----------------------------<  91  4.0   |  22  |  0.47<L>    Ca    9.2      17 Jan 2019 05:29  Phos  3.6     01-17  Mg     1.9     01-17    TPro  7.0  /  Alb  3.3  /  TBili  0.6  /  DBili  x   /  AST  49<H>  /  ALT  13  /  AlkPhos  152<H>  01-17          MICROBIOLOGY:  v  BLOOD PERIPHERAL  01-15-19 --  --  --      BLOOD  01-15-19 --  --  --                RADIOLOGY:  Images below reviewed personally  < from: CT Abdomen and Pelvis w/ Oral Cont and w/ IV Cont (01.12.19 @ 14:11) >  IMPRESSION:   Numerous hypodense hepatic lesions with the largest lesion measuring 11.1   cm. Both infectious and neoplastic etiologies are included in the   differential diagnosis.    Mild pericholecystic stranding and wall thickening. No gallstones   visualized. Findings are thought likely reactive to the adjacent liver   disease. Clinical correlation recommended. If there is concern for acute   cholecystitis, further evaluation with abdominal ultrasound and/or HIDA   scan can be performed.    Right adnexal dermoid ill-defined hypodensity of the left uterine body   and fundal myometrium and fluid extending from the left adnexa. Further   evaluation with pelvic sonography is suggested.

## 2019-01-17 NOTE — PROGRESS NOTE ADULT - PROBLEM SELECTOR PLAN 1
Pt w/ HR>90, leukocytosis of 19. Found to have to have multiple hepatic lesions, largest 11.1 cm. DDx includes infectious and neoplastic etiology.  - will c/w zosyn  - ID following  - will c/s IR, will determine whether mass is drainable  - f/u Bcxs Pt w/ HR>90, leukocytosis of 19. Found to have to have multiple hepatic lesions, largest 11.1 cm. DDx includes infectious and neoplastic etiology.  - will c/w zosyn  - ID following, will f/u recs  - will c/s IR, will determine whether mass is drainable  - f/u Bcxs

## 2019-01-17 NOTE — DISCHARGE NOTE ADULT - MEDICATION SUMMARY - MEDICATIONS TO TAKE
I will START or STAY ON the medications listed below when I get home from the hospital:    acetaminophen 325 mg oral tablet  -- 2 tab(s) by mouth every 6 hours, As needed, Mild Pain (1 - 3), Moderate Pain (4 - 6)  -- Indication: For Abdmominal pain    Calcium 600+D 600 mg-200 intl units oral tablet  -- 1 tab(s) by mouth once a day  -- Indication: For Nutritional supplement    Vitamin D3 1000 intl units oral tablet  -- 1 tab(s) by mouth once a day  -- Indication: For Nutritional supplement

## 2019-01-17 NOTE — DISCHARGE NOTE ADULT - CARE PLAN
Principal Discharge DX:	Hepatic lesion  Goal:	Diagnosis, management  Assessment and plan of treatment:	You came to the hospital with liver lesions that were seen on outpatient CT imaging. You were also found to have an elevated white blood cell count on blood work. In the hospital, you were started on the antibiotic zosyn for treatment of a possible liver abscess with a slight decrease in your WBC count. You also underwent an IR biopsy of the liver lesion and multiple samples of tissue were obtained for testing. Please follow up with Dr. Paul from infectious disease for results of the IR biopsy. Principal Discharge DX:	Hepatic lesion  Goal:	Diagnosis, management  Assessment and plan of treatment:	You came to the hospital with liver lesions that were seen on outpatient CT imaging. You were also found to have an elevated white blood cell count on blood work. In the hospital, you were started on the antibiotic zosyn for treatment of a possible liver abscess with a slight decrease in your WBC count. You also underwent an IR biopsy of the liver lesion and multiple samples of tissue were obtained for testing. Please follow up with Dr. Paul from infectious disease for results of the IR biopsy and make an appointment at the [facility]. Since we do not suspect infection at this point, you will be discharged off antibiotics.

## 2019-01-17 NOTE — DISCHARGE NOTE ADULT - ADDITIONAL INSTRUCTIONS
Please follow up with infectious disease (Dr. Paul) on discharge. Please follow up with infectious disease (Dr. Paul) after discharge.  Please follow up at the [facility] after discharge.

## 2019-01-17 NOTE — PROGRESS NOTE ADULT - ASSESSMENT
68 f with no PMH originally from [place], [place], 4 weeks ago came back from a month trip to [facility] developed fever and abd pain there and when came back the fever went to 103 with loss of appetite, fatigue and weight loss, was seen by PMD and was referred to ID clinic for leukocytosis 19 and fever. The outpatient work ups showed again WBC 19, negative blood culture, quantiferon, brucella, stool O&P and PCR but the chest/abd CT showed numerous hypodensity in the liver, the largest 11 cm s/o infectious or neoplastic etiology, so pt was referred to the hospital    fever, leukocytosis, weight loss with liver lesion, ?abscess vs neoplasm  blood cxs negative  s/p IR but the lesion was solid mass and no purulence was drained so it was just sent for path    * on zosyn 3.375 q 8 for now, started 1/15, now day 3  * can switch to PO cipro 500 q 12 and flagyl 500 q 12 to complete a 10 day course for now  * f/u with ID (pt already has an appointment)   * will follow up the path results

## 2019-01-17 NOTE — DIETITIAN INITIAL EVALUATION ADULT. - ENERGY NEEDS
Height (cm): 152.4 (15 Catrachito 2019 12:59)  Weight (kg): 54.2 (15 Catrachito 2019 12:59)  BMI (kg/m2): 23.3 (15 Catrachito 2019 12:59)  IBW: 45.5kg +/-10%  No pressure ulcers/DTI noted per flowsheets. No edema noted.

## 2019-01-17 NOTE — PROGRESS NOTE ADULT - SUBJECTIVE AND OBJECTIVE BOX
Ines Gardner MD PGY1     Pager [phone]/ [phone]    For Night coverage 7pm-7am: NS- page 1443 Team1-3, page 1446 Team4 & Care Model  Sat/Rivas Cross Coverage 12pm-7pm: NS- page 1443 for Team1-4, [facility]- pager forwarded to covering Resident      RENETTA SAUCEDO  68y  Female    Subjective: No events overnight. Pt s/p IR biopsy of hepatic lesion yesterday.       T(C): 36.6 (01-17-19 @ 05:32), Max: 36.8 (01-16-19 @ 21:44)  HR: 79 (01-17-19 @ 05:32) (79 - 84)  BP: 143/65 (01-17-19 @ 05:32) (130/66 - 143/65)  RR: 17 (01-17-19 @ 05:32) (17 - 18)  SpO2: 97% (01-17-19 @ 05:32) (97% - 99%)  Wt(kg): --Vital Signs Last 24 Hrs  T(C): 36.6 (17 Jan 2019 05:32), Max: 36.8 (16 Jan 2019 21:44)  T(F): 97.8 (17 Jan 2019 05:32), Max: 98.2 (16 Jan 2019 21:44)  HR: 79 (17 Jan 2019 05:32) (79 - 84)  BP: 143/65 (17 Jan 2019 05:32) (130/66 - 143/65)  BP(mean): --  RR: 17 (17 Jan 2019 05:32) (17 - 18)  SpO2: 97% (17 Jan 2019 05:32) (97% - 99%)    PHYSICAL EXAM:  GENERAL: NAD, well-groomed, well-developed  HEAD:  Atraumatic, Normocephalic  EYES: EOMI, PERRLA, conjunctiva and sclera clear  ENMT: No tonsillar erythema, exudates, or enlargement; Moist mucous membranes, Good dentition, No lesions  NECK: Supple, No JVD, Normal thyroid  NERVOUS SYSTEM:  Alert & Oriented X3   CHEST/LUNG: Clear to percussion bilaterally; No rales, rhonchi, wheezing, or rubs  HEART: Regular rate and rhythm; No murmurs, rubs, or gallops  ABDOMEN: Soft, Nontender, Nondistended; Bowel sounds present  EXTREMITIES:  2+ Peripheral Pulses, No clubbing, cyanosis, or edema  LYMPH: No lymphadenopathy noted  SKIN: No rashes or lesions      Consultant(s) Notes Reviewed:  [x ] YES  [ ] NO  Care Discussed with Consultants/Other Providers [ x] YES  [ ] NO    LABS:                        13.0   17.49 )-----------( 308      ( 17 Jan 2019 05:29 )             40.6     01-17    134<L>  |  99  |  5<L>  ----------------------------<  91  4.0   |  22  |  0.47<L>    Ca    9.2      17 Jan 2019 05:29  Phos  3.6     01-17  Mg     1.9     01-17    TPro  7.0  /  Alb  3.3  /  TBili  0.6  /  DBili  x   /  AST  49<H>  /  ALT  13  /  AlkPhos  152<H>  01-17    PT/INR - ( 15 Catrachito 2019 10:10 )   PT: 12.7 SEC;   INR: 1.11          PTT - ( 15 Catrachito 2019 10:10 )  PTT:28.6 SEC          RADIOLOGY, EKG & ADDITIONAL TESTS: Reviewed.         RADIOLOGY & ADDITIONAL TESTS:    Imaging Personally Reviewed:  [ ] YES  [ ] NO  MedsMEDICATIONS  (STANDING):  piperacillin/tazobactam IVPB. 3.375 Gram(s) IV Intermittent every 8 hours    MEDICATIONS  (PRN):  acetaminophen   Tablet .. 650 milliGRAM(s) Oral every 6 hours PRN Mild Pain (1 - 3), Moderate Pain (4 - 6) Ines Gardner MD PGY1     Pager [phone]/ [phone]    For Night coverage 7pm-7am: NS- page 1443 Team1-3, page 1446 Team4 & Care Model  Sat/Rivas Cross Coverage 12pm-7pm: NS- page 1443 for Team1-4, [facility]- pager forwarded to covering Resident      RENETTA SAUCEDO  68y  Female    Subjective: No events overnight. Pt s/p IR biopsy of hepatic lesion yesterday. Pt endorsing epigastric tenderness in location of biopsy. She denies n/, f/c, sob, cp.      T(C): 36.6 (01-17-19 @ 05:32), Max: 36.8 (01-16-19 @ 21:44)  HR: 79 (01-17-19 @ 05:32) (79 - 84)  BP: 143/65 (01-17-19 @ 05:32) (130/66 - 143/65)  RR: 17 (01-17-19 @ 05:32) (17 - 18)  SpO2: 97% (01-17-19 @ 05:32) (97% - 99%)  Wt(kg): --Vital Signs Last 24 Hrs  T(C): 36.6 (17 Jan 2019 05:32), Max: 36.8 (16 Jan 2019 21:44)  T(F): 97.8 (17 Jan 2019 05:32), Max: 98.2 (16 Jan 2019 21:44)  HR: 79 (17 Jan 2019 05:32) (79 - 84)  BP: 143/65 (17 Jan 2019 05:32) (130/66 - 143/65)  BP(mean): --  RR: 17 (17 Jan 2019 05:32) (17 - 18)  SpO2: 97% (17 Jan 2019 05:32) (97% - 99%)    PHYSICAL EXAM:  GENERAL: NAD, well-groomed, well-developed  HEAD:  Atraumatic, Normocephalic  EYES: EOMI, PERRLA, conjunctiva and sclera clear  ENMT: No tonsillar erythema, exudates, or enlargement; Moist mucous membranes, Good dentition, No lesions  NECK: Supple, No JVD, Normal thyroid  NERVOUS SYSTEM:  Alert & Oriented X3   CHEST/LUNG: Clear to percussion bilaterally; No rales, rhonchi, wheezing, or rubs  HEART: Regular rate and rhythm; No murmurs, rubs, or gallops  ABDOMEN: TTP in epigastrium, +BS  EXTREMITIES:  2+ Peripheral Pulses, No clubbing, cyanosis, or edema  LYMPH: No lymphadenopathy noted  SKIN: No rashes or lesions      Consultant(s) Notes Reviewed:  [x ] YES  [ ] NO  Care Discussed with Consultants/Other Providers [ x] YES  [ ] NO    LABS:                        13.0   17.49 )-----------( 308      ( 17 Jan 2019 05:29 )             40.6     01-17    134<L>  |  99  |  5<L>  ----------------------------<  91  4.0   |  22  |  0.47<L>    Ca    9.2      17 Jan 2019 05:29  Phos  3.6     01-17  Mg     1.9     01-17    TPro  7.0  /  Alb  3.3  /  TBili  0.6  /  DBili  x   /  AST  49<H>  /  ALT  13  /  AlkPhos  152<H>  01-17    PT/INR - ( 15 Catrachito 2019 10:10 )   PT: 12.7 SEC;   INR: 1.11          PTT - ( 15 Catrachito 2019 10:10 )  PTT:28.6 SEC          RADIOLOGY, EKG & ADDITIONAL TESTS: Reviewed.         RADIOLOGY & ADDITIONAL TESTS:    Imaging Personally Reviewed:  [ ] YES  [ ] NO  MedsMEDICATIONS  (STANDING):  piperacillin/tazobactam IVPB. 3.375 Gram(s) IV Intermittent every 8 hours    MEDICATIONS  (PRN):  acetaminophen   Tablet .. 650 milliGRAM(s) Oral every 6 hours PRN Mild Pain (1 - 3), Moderate Pain (4 - 6)

## 2019-01-17 NOTE — PROGRESS NOTE ADULT - ASSESSMENT
67 yo F with no known medical history presenting w/ liver mass noted on outpatient CT in the setting of intermittent dull, diffuse abdominal pain x1 month and recent travel to [place]. 69 yo F with no known medical history presenting w/ liver mass noted on outpatient CT in the setting of intermittent dull, diffuse abdominal pain x1 month and recent travel to [place]. PT s/p IR biopsy 1/17, results pending.

## 2019-01-17 NOTE — DISCHARGE NOTE ADULT - PATIENT PORTAL LINK FT
You can access the [email or web address], offered by [facility], by registering with the following website: [email or web address]

## 2019-01-18 VITALS
TEMPERATURE: 98 F | OXYGEN SATURATION: 100 % | RESPIRATION RATE: 17 BRPM | HEART RATE: 81 BPM | SYSTOLIC BLOOD PRESSURE: 138 MMHG | DIASTOLIC BLOOD PRESSURE: 78 MMHG

## 2019-01-18 LAB
BASOPHILS # BLD AUTO: 0.08 K/UL — SIGNIFICANT CHANGE UP (ref 0–0.2)
BASOPHILS NFR BLD AUTO: 0.5 % — SIGNIFICANT CHANGE UP (ref 0–2)
EOSINOPHIL # BLD AUTO: 0.13 K/UL — SIGNIFICANT CHANGE UP (ref 0–0.5)
EOSINOPHIL NFR BLD AUTO: 0.8 % — SIGNIFICANT CHANGE UP (ref 0–6)
GAMMA INTERFERON BACKGROUND BLD IA-ACNC: 0.02 IU/ML — SIGNIFICANT CHANGE UP
HCT VFR BLD CALC: 39.6 % — SIGNIFICANT CHANGE UP (ref 34.5–45)
HGB BLD-MCNC: 12.9 G/DL — SIGNIFICANT CHANGE UP (ref 11.5–15.5)
IMM GRANULOCYTES NFR BLD AUTO: 0.5 % — SIGNIFICANT CHANGE UP (ref 0–1.5)
LYMPHOCYTES # BLD AUTO: 17.4 % — SIGNIFICANT CHANGE UP (ref 13–44)
LYMPHOCYTES # BLD AUTO: 2.85 K/UL — SIGNIFICANT CHANGE UP (ref 1–3.3)
M TB IFN-G BLD-IMP: SIGNIFICANT CHANGE UP
M TB IFN-G CD4+ BCKGRND COR BLD-ACNC: 0 IU/ML — SIGNIFICANT CHANGE UP
M TB IFN-G CD4+CD8+ BCKGRND COR BLD-ACNC: 0 IU/ML — SIGNIFICANT CHANGE UP
MCHC RBC-ENTMCNC: 28.7 PG — SIGNIFICANT CHANGE UP (ref 27–34)
MCHC RBC-ENTMCNC: 32.6 % — SIGNIFICANT CHANGE UP (ref 32–36)
MCV RBC AUTO: 88 FL — SIGNIFICANT CHANGE UP (ref 80–100)
MONOCYTES # BLD AUTO: 1.27 K/UL — HIGH (ref 0–0.9)
MONOCYTES NFR BLD AUTO: 7.8 % — SIGNIFICANT CHANGE UP (ref 2–14)
NEUTROPHILS # BLD AUTO: 11.96 K/UL — HIGH (ref 1.8–7.4)
NEUTROPHILS NFR BLD AUTO: 73 % — SIGNIFICANT CHANGE UP (ref 43–77)
NRBC # FLD: 0 K/UL — LOW (ref 25–125)
PLATELET # BLD AUTO: 346 K/UL — SIGNIFICANT CHANGE UP (ref 150–400)
PMV BLD: 9.7 FL — SIGNIFICANT CHANGE UP (ref 7–13)
QUANT TB PLUS MITOGEN MINUS NIL: 0.22 IU/ML — SIGNIFICANT CHANGE UP
RBC # BLD: 4.5 M/UL — SIGNIFICANT CHANGE UP (ref 3.8–5.2)
RBC # FLD: 13.3 % — SIGNIFICANT CHANGE UP (ref 10.3–14.5)
WBC # BLD: 16.38 K/UL — HIGH (ref 3.8–10.5)
WBC # FLD AUTO: 16.38 K/UL — HIGH (ref 3.8–10.5)

## 2019-01-18 PROCEDURE — 99232 SBSQ HOSP IP/OBS MODERATE 35: CPT | Mod: GC

## 2019-01-18 PROCEDURE — 99231 SBSQ HOSP IP/OBS SF/LOW 25: CPT

## 2019-01-18 PROCEDURE — 99239 HOSP IP/OBS DSCHRG MGMT >30: CPT

## 2019-01-18 RX ORDER — ACETAMINOPHEN 500 MG
2 TABLET ORAL
Qty: 0 | Refills: 0 | COMMUNITY
Start: 2019-01-18

## 2019-01-18 RX ADMIN — ENOXAPARIN SODIUM 40 MILLIGRAM(S): 100 INJECTION SUBCUTANEOUS at 11:58

## 2019-01-18 NOTE — PROGRESS NOTE ADULT - SUBJECTIVE AND OBJECTIVE BOX
Ines Gardner MD PGY1     Pager [phone]/ [phone]    For Night coverage 7pm-7am: NS- page 1443 Team1-3, page 1446 Team4 & Care Model  Sat/Rivas Cross Coverage 12pm-7pm: NS- page 1443 for Team1-4, [facility]- pager forwarded to covering Resident      RENETTA SAUCEDO  68y  Female    Complaints:  Subjective:         T(C): 36.5 (01-18-19 @ 05:46), Max: 37.1 (01-17-19 @ 22:06)  HR: 81 (01-18-19 @ 05:46) (81 - 87)  BP: 138/78 (01-18-19 @ 05:46) (138/78 - 150/80)  RR: 17 (01-18-19 @ 05:46) (17 - 18)  SpO2: 100% (01-18-19 @ 05:46) (96% - 100%)  Wt(kg): --Vital Signs Last 24 Hrs  T(C): 36.5 (18 Jan 2019 05:46), Max: 37.1 (17 Jan 2019 22:06)  T(F): 97.7 (18 Jan 2019 05:46), Max: 98.8 (17 Jan 2019 22:06)  HR: 81 (18 Jan 2019 05:46) (81 - 87)  BP: 138/78 (18 Jan 2019 05:46) (138/78 - 150/80)  BP(mean): --  RR: 17 (18 Jan 2019 05:46) (17 - 18)  SpO2: 100% (18 Jan 2019 05:46) (96% - 100%)    PHYSICAL EXAM:  GENERAL: NAD, well-groomed, well-developed  HEAD:  Atraumatic, Normocephalic  EYES: EOMI, PERRLA, conjunctiva and sclera clear  ENMT: No tonsillar erythema, exudates, or enlargement; Moist mucous membranes, Good dentition, No lesions  NECK: Supple, No JVD, Normal thyroid  NERVOUS SYSTEM:  Alert & Oriented X3, Good concentration; Motor Strength 5/5 B/L upper and lower extremities; DTRs 2+ intact and symmetric  CHEST/LUNG: Clear to percussion bilaterally; No rales, rhonchi, wheezing, or rubs  HEART: Regular rate and rhythm; No murmurs, rubs, or gallops  ABDOMEN: Soft, Nontender, Nondistended; Bowel sounds present  EXTREMITIES:  2+ Peripheral Pulses, No clubbing, cyanosis, or edema  LYMPH: No lymphadenopathy noted  SKIN: No rashes or lesions    Consultant(s) Notes Reviewed:  [x ] YES  [ ] NO  Care Discussed with Consultants/Other Providers [ x] YES  [ ] NO    LABS:                        12.9   16.38 )-----------( 346      ( 18 Jan 2019 06:07 )             39.6     01-17    134<L>  |  99  |  5<L>  ----------------------------<  91  4.0   |  22  |  0.47<L>    Ca    9.2      17 Jan 2019 05:29  Phos  3.6     01-17  Mg     1.9     01-17    TPro  7.0  /  Alb  3.3  /  TBili  0.6  /  DBili  x   /  AST  49<H>  /  ALT  13  /  AlkPhos  152<H>  01-17        CAPILLARY BLOOD GLUCOSE                Echo:    RADIOLOGY & ADDITIONAL TESTS:            RADIOLOGY & ADDITIONAL TESTS:    Imaging Personally Reviewed:  [ ] YES  [ ] NO  MedsMEDICATIONS  (STANDING):  ciprofloxacin     Tablet 500 milliGRAM(s) Oral every 12 hours  enoxaparin Injectable 40 milliGRAM(s) SubCutaneous every 24 hours  metroNIDAZOLE    Tablet 500 milliGRAM(s) Oral every 12 hours    MEDICATIONS  (PRN):  acetaminophen   Tablet .. 650 milliGRAM(s) Oral every 6 hours PRN Mild Pain (1 - 3), Moderate Pain (4 - 6)

## 2019-01-18 NOTE — PROGRESS NOTE ADULT - ASSESSMENT
68 f with no PMH originally from [place], [place], 4 weeks ago came back from a month trip to [facility] developed fever and abd pain there and when came back the fever went to 103 with loss of appetite, fatigue and weight loss, was seen by PMD and was referred to ID clinic for leukocytosis 19 and fever. The outpatient work ups showed again WBC 19, negative blood culture, quantiferon, brucella, stool O&P and PCR but the chest/abd CT showed numerous hypodensity in the liver, the largest 11 cm s/o infectious or neoplastic etiology, so pt was referred to the hospital    fever, leukocytosis, weight loss with liver lesion, most likely neoplasm as the  1122  blood cxs negative  s/p IR but the lesion was solid mass and no purulence was drained so it was just sent for path      * was on zosyn 3.375 q 8  for 3 days but it was discontinued as the lesion was a solid mass and blood cultures negative  * no antibiotics for now  * f/u with ID (pt already has an appointment)   * will follow up the path results

## 2019-01-18 NOTE — PROGRESS NOTE ADULT - ASSESSMENT
67 yo F with no known medical history presenting w/ liver mass noted on outpatient CT in the setting of intermittent dull, diffuse abdominal pain x1 month and recent travel to [place]. PT s/p IR biopsy 1/17, results pending.

## 2019-01-18 NOTE — PROGRESS NOTE ADULT - ATTENDING COMMENTS
Patient seen and examined by me. Case discussed with resident and agree with the resident's findings and plan as documented in the resident's note. 68F with recent trip to [place] with no known medical history presenting w/ liver mass noted on outpatient CT in the setting of intermittent dull, diffuse abdominal pain x1 month, fevers to 103, loss of appetite, fatigue and weight loss with recent travel to [place] 4 weeks ago. Per ID notes, outpatient work ups showed again WBC 19, negative blood culture, quantiferon, brucella, stool O&P and PCR.    -f/u biopsy results as an outpatient; patient to call [facility] and set-up an appointment  -on cipro/flagyl PO patient remained afebrile for 24hrs, however did not tolerate abx well causing her to vomit; will d/w ID if ok to stop antibiotics given no positive culture results.  -case discussed with ID who will f/u with patient closely as an outpatient    d/c home  see discharge summary in sunrise  time spend discharging patient 39min.

## 2019-01-18 NOTE — PROGRESS NOTE ADULT - NSHPATTENDINGPLANDISCUSS_GEN_ALL_CORE
the medicine attending
the medicine team
the primary team
Patient and  who are in agreement with plan described above.

## 2019-01-18 NOTE — PROGRESS NOTE ADULT - PROBLEM SELECTOR PLAN 1
Pt w/ HR>90, leukocytosis of 19. Found to have to have multiple hepatic lesions, largest 11.1 cm. DDx includes infectious and neoplastic etiology.  - will c/w zosyn  - ID following, will f/u recs  - will c/s IR, will determine whether mass is drainable  - f/u Bcxs

## 2019-01-18 NOTE — PROGRESS NOTE ADULT - SUBJECTIVE AND OBJECTIVE BOX
Follow Up:  fever, liver abscess    Interval History: pt afebrile now, WBC still high but went down to 16, s/p IR but no purulence was drained and it was solid tissue and also CA 19-9 elelvated    ROS:      All other systems negative    Constitutional: no fever, no chills  Head: no trauma  Eyes: no vision changes, no eye pain  ENT:  no sore throat, no rhinorrhea  Cardiovascular:  no chest pain, no palpitation  Respiratory:  no SOB, no cough  GI:  intermittent abd pain, no vomiting, no diarrhea  urinary: no dysuria, no hematuria, no flank pain  musculoskeletal:  no joint pain, no joint swelling  skin:  no rash  neurology:  no headache, no seizure, no change in mental status  psych: no anxiety, no depression       Allergies  aspirin (Stomach Upset)        ANTIMICROBIALS:      OTHER MEDS:  acetaminophen   Tablet .. 650 milliGRAM(s) Oral every 6 hours PRN  enoxaparin Injectable 40 milliGRAM(s) SubCutaneous every 24 hours      Vital Signs Last 24 Hrs  T(C): 36.5 (18 Jan 2019 05:46), Max: 37.1 (17 Jan 2019 22:06)  T(F): 97.7 (18 Jan 2019 05:46), Max: 98.8 (17 Jan 2019 22:06)  HR: 81 (18 Jan 2019 05:46) (81 - 87)  BP: 138/78 (18 Jan 2019 05:46) (138/78 - 150/80)  BP(mean): --  RR: 17 (18 Jan 2019 05:46) (17 - 18)  SpO2: 100% (18 Jan 2019 05:46) (96% - 100%)    Physical Exam:  General:    NAD,  non toxic, A&O x 3  Head: atraumatic, normocephalic  Eye: normal sclera and conjunctiva  ENT:    no oropharyngeal lesions,   no LAD,   neck supple  Cardio:     regular S1, S2,  no murmur  Respiratory:    clear b/l,    no wheezing  abd:     soft,   BS +,   no tenderness,    no organomegaly  :   no CVAT,  no suprapubic tenderness,   no  grady  Musculoskeletal:   no joint swelling,   no edema  vascular: no lines, normal pulses  Skin:    no rash  Neurologic:     no focal deficit  psych: normal affect, no suicidal ideation                          12.9   16.38 )-----------( 346      ( 18 Jan 2019 06:07 )             39.6       01-17    134<L>  |  99  |  5<L>  ----------------------------<  91  4.0   |  22  |  0.47<L>    Ca    9.2      17 Jan 2019 05:29  Phos  3.6     01-17  Mg     1.9     01-17    TPro  7.0  /  Alb  3.3  /  TBili  0.6  /  DBili  x   /  AST  49<H>  /  ALT  13  /  AlkPhos  152<H>  01-17          MICROBIOLOGY:  v  BLOOD PERIPHERAL  01-15-19 --  --  --      BLOOD  01-15-19 --  --  --                RADIOLOGY:  Images below reviewed personally  < from: US Transvaginal (01.17.19 @ 11:57) >  IMPRESSION:    Moderate amount of fluid within the endometrial cavity.    Right ovarian dermoid.    Small volume free fluid in the right adnexa and cul-de-sac.

## 2019-01-18 NOTE — PROGRESS NOTE ADULT - REASON FOR ADMISSION
hepatic abscess

## 2019-01-18 NOTE — PROGRESS NOTE ADULT - SUBJECTIVE AND OBJECTIVE BOX
68y Female s/p liver biopsy on 1/16/19 in Interventional Radiology.     Patient seen and examined bedside resting comfortably. Doing well with family members bedside. Reports abdominal pain no relieved with IV tylenol.    T(F): 97.7 (01-18-19 @ 05:46), Max: 98.8 (01-17-19 @ 22:06)  HR: 81 (01-18-19 @ 05:46) (81 - 87)  BP: 138/78 (01-18-19 @ 05:46) (138/78 - 150/80)  RR: 17 (01-18-19 @ 05:46) (17 - 18)  SpO2: 100% (01-18-19 @ 05:46) (96% - 100%)  Wt(kg): --    LABS:                        12.9   16.38 )-----------( 346      ( 18 Jan 2019 06:07 )             39.6     01-17    134<L>  |  99  |  5<L>  ----------------------------<  91  4.0   |  22  |  0.47<L>    Ca    9.2      17 Jan 2019 05:29  Phos  3.6     01-17  Mg     1.9     01-17    TPro  7.0  /  Alb  3.3  /  TBili  0.6  /  DBili  x   /  AST  49<H>  /  ALT  13  /  AlkPhos  152<H>  01-17    I&O's Detail    PHYSICAL EXAM:  General: Nontoxic, in NAD  Neuro:  Alert & oriented x 3  Abdomen: NTND, midline abdomen liver biopsy site clean, no hematoma, no ecchymosis    Impression: 68y Female admitted with Abscess of liver  - Liver biopsy site check performed. no acute issues.   - Pending biopsy results  - Will follow as needed    [id number] 68y Female s/p liver biopsy on 1/16/19 in Interventional Radiology.     Patient seen and examined bedside resting comfortably. Doing well with family members bedside. Reports abdominal pain now relieved with IV tylenol.    T(F): 97.7 (01-18-19 @ 05:46), Max: 98.8 (01-17-19 @ 22:06)  HR: 81 (01-18-19 @ 05:46) (81 - 87)  BP: 138/78 (01-18-19 @ 05:46) (138/78 - 150/80)  RR: 17 (01-18-19 @ 05:46) (17 - 18)  SpO2: 100% (01-18-19 @ 05:46) (96% - 100%)  Wt(kg): --    LABS:                        12.9   16.38 )-----------( 346      ( 18 Jan 2019 06:07 )             39.6     01-17    134<L>  |  99  |  5<L>  ----------------------------<  91  4.0   |  22  |  0.47<L>    Ca    9.2      17 Jan 2019 05:29  Phos  3.6     01-17  Mg     1.9     01-17    TPro  7.0  /  Alb  3.3  /  TBili  0.6  /  DBili  x   /  AST  49<H>  /  ALT  13  /  AlkPhos  152<H>  01-17    I&O's Detail    PHYSICAL EXAM:  General: Nontoxic, in NAD  Neuro:  Alert & oriented x 3  Abdomen: NTND, midline abdomen liver biopsy site clean, no hematoma, no ecchymosis    Impression: 68y Female admitted with Abscess of liver  - Liver biopsy site check performed. no acute issues.   - Pending biopsy results  - Will follow as needed    [id number]

## 2019-01-22 NOTE — DISCUSSION/SUMMARY
[Home] : patient was discharged to home [FreeTextEntry1] : Spoke to patient about recent hospitalization for liver mass. States that she is feeling better, but still experiences some pain. While in hospital, biopsy was done, patient is still awaiting test results from hospital. Originally was on IV abx in hospital and when changed to PO, she was unable to tolerate. Abx were d/c and patient has no new medications she has been taking since discharge. No fevers. She has a follow up appointment with Infectious Disease and will visit there first, before following up with PCP. She will call back with any further questions or concerns.

## 2019-01-24 ENCOUNTER — APPOINTMENT (OUTPATIENT)
Dept: INFECTIOUS DISEASE | Facility: CLINIC | Age: 69
End: 2019-01-24
Payer: COMMERCIAL

## 2019-01-24 VITALS
OXYGEN SATURATION: 98 % | WEIGHT: 132 LBS | SYSTOLIC BLOOD PRESSURE: 149 MMHG | HEART RATE: 116 BPM | TEMPERATURE: 99.2 F | BODY MASS INDEX: 26.61 KG/M2 | HEIGHT: 59 IN | DIASTOLIC BLOOD PRESSURE: 89 MMHG

## 2019-01-24 DIAGNOSIS — R53.83 OTHER FATIGUE: ICD-10-CM

## 2019-01-24 PROCEDURE — 99215 OFFICE O/P EST HI 40 MIN: CPT

## 2019-01-24 NOTE — HISTORY OF PRESENT ILLNESS
[FreeTextEntry1] : 68 f with no significant past medical history, originally from [place], came back a month trip to [facility] 12/2018. She developed low grade fevers and abd pain there and then when she came back the fever went as high as 103 but no chills or sweating, joint pain, rash, cough, N/V/D or dysuria. The abd pain is intermittent and not severe, sometimes back pain on both sides.\par she had lost over 20 ibs within the past few weeks with loss of appetite and fatigue.\par She denied sick contacts. TB exposure, unpasteurized dairy products or bug bites\par work up showed WC 19, negative HIV, hepatitis, brucella, blood cultures, urine cx and stool pcr\par abd/pelvis CT showed large liver lesions s/o neoplasm or abscess\par she was called and sent to the hospital\par she was started on zosyn and IR aspirated the lesion which was a solid mass and no purulence was drained. Patient was discharged off antibiotics to follow up the results.\par Now she is here for follow up and the preliminary biopsy results showed malignant cells

## 2019-01-24 NOTE — ASSESSMENT
[FreeTextEntry1] : 68 f with no significant past medical history, originally from [place] now [place] the [place], came back a month trip to [place] about 3 weeks ago, referred to ID for fever, loss of appetite, weight loss, intermittent abd pain, leukocytosis 19 negative HIV, hepatitis, brucella, blood cultures, urine cx and stool pcr\par abd/pelvis CT showed large liver lesions s/o neoplasm or abscess s/p hospitalization and IR biopsy which showed malignant cell\par \par * the results were explained to the patient and family members\par * no need for antibiotics\par * all questions were answered\par * pt is going to hem/onc in 1 week

## 2019-01-24 NOTE — REVIEW OF SYSTEMS
[Fever] : fever [Recent Weight Loss (___ Lbs)] : recent [unfilled] ~Ulb weight loss [Negative] : Heme/Lymph [Chills] : no chills [Body Aches] : no body aches [Eye Pain] : no eye pain [Red Eyes] : eyes not red [Earache] : no earache [Loss Of Hearing] : no hearing loss [Chest Pain] : no chest pain [Palpitations] : no palpitations [Shortness Of Breath] : no shortness of breath [Wheezing] : no wheezing [Cough] : no cough [Dysuria] : no dysuria [Pelvic Pain] : no pelvic pain [Vaginal Discharge] : no vaginal discharge [Joint Pain] : no joint pain [Joint Swelling] : no joint swelling [Skin Lesions] : no skin lesions [Skin Wound] : no skin wound [Confused] : no confusion [Convulsions] : no convulsions [Suicidal] : not suicidal [Anxiety] : no anxiety [Easy Bleeding] : no tendency for easy bleeding [Easy Bruising] : no tendency for easy bruising

## 2019-01-29 ENCOUNTER — OUTPATIENT (OUTPATIENT)
Dept: OUTPATIENT SERVICES | Facility: HOSPITAL | Age: 69
LOS: 1 days | Discharge: HOME | End: 2019-01-29

## 2019-01-29 DIAGNOSIS — C22.9 MALIGNANT NEOPLASM OF LIVER, NOT SPECIFIED AS PRIMARY OR SECONDARY: ICD-10-CM

## 2019-01-30 ENCOUNTER — RESULT REVIEW (OUTPATIENT)
Age: 69
End: 2019-01-30

## 2019-01-30 ENCOUNTER — APPOINTMENT (OUTPATIENT)
Dept: HEMATOLOGY ONCOLOGY | Facility: CLINIC | Age: 69
End: 2019-01-30
Payer: COMMERCIAL

## 2019-01-30 VITALS
HEART RATE: 105 BPM | SYSTOLIC BLOOD PRESSURE: 179 MMHG | DIASTOLIC BLOOD PRESSURE: 84 MMHG | RESPIRATION RATE: 17 BRPM | TEMPERATURE: 98.8 F | HEIGHT: 59.84 IN | BODY MASS INDEX: 25.97 KG/M2 | OXYGEN SATURATION: 99 % | WEIGHT: 132.28 LBS

## 2019-01-30 DIAGNOSIS — Z82.49 FAMILY HISTORY OF ISCHEMIC HEART DISEASE AND OTHER DISEASES OF THE CIRCULATORY SYSTEM: ICD-10-CM

## 2019-01-30 DIAGNOSIS — Z80.0 FAMILY HISTORY OF MALIGNANT NEOPLASM OF DIGESTIVE ORGANS: ICD-10-CM

## 2019-01-30 LAB
ALBUMIN SERPL ELPH-MCNC: 3.9 G/DL
ALP BLD-CCNC: 182 U/L
ALT SERPL-CCNC: 21 U/L
ANION GAP SERPL CALC-SCNC: 14 MMOL/L
AST SERPL-CCNC: 84 U/L
BASOPHILS # BLD AUTO: 0.1 K/UL — SIGNIFICANT CHANGE UP (ref 0–0.2)
BASOPHILS NFR BLD AUTO: 0.4 % — SIGNIFICANT CHANGE UP (ref 0–2)
BILIRUB SERPL-MCNC: 0.5 MG/DL
BUN SERPL-MCNC: 10 MG/DL
CALCIUM SERPL-MCNC: 9.9 MG/DL
CHLORIDE SERPL-SCNC: 96 MMOL/L
CO2 SERPL-SCNC: 24 MMOL/L
CREAT SERPL-MCNC: 0.41 MG/DL
EOSINOPHIL # BLD AUTO: 0.1 K/UL — SIGNIFICANT CHANGE UP (ref 0–0.5)
EOSINOPHIL NFR BLD AUTO: 0.7 % — SIGNIFICANT CHANGE UP (ref 0–6)
GLUCOSE SERPL-MCNC: 97 MG/DL
HCT VFR BLD CALC: 39.9 % — SIGNIFICANT CHANGE UP (ref 34.5–45)
HGB BLD-MCNC: 13 G/DL — SIGNIFICANT CHANGE UP (ref 11.5–15.5)
LYMPHOCYTES # BLD AUTO: 13.8 % — SIGNIFICANT CHANGE UP (ref 13–44)
LYMPHOCYTES # BLD AUTO: 2.9 K/UL — SIGNIFICANT CHANGE UP (ref 1–3.3)
MCHC RBC-ENTMCNC: 28.8 PG — SIGNIFICANT CHANGE UP (ref 27–34)
MCHC RBC-ENTMCNC: 32.5 G/DL — SIGNIFICANT CHANGE UP (ref 32–36)
MCV RBC AUTO: 88.5 FL — SIGNIFICANT CHANGE UP (ref 80–100)
MONOCYTES # BLD AUTO: 1.2 K/UL — HIGH (ref 0–0.9)
MONOCYTES NFR BLD AUTO: 5.9 % — SIGNIFICANT CHANGE UP (ref 2–14)
NEUTROPHILS # BLD AUTO: 16.7 K/UL — HIGH (ref 1.8–7.4)
NEUTROPHILS NFR BLD AUTO: 79.2 % — HIGH (ref 43–77)
PLATELET # BLD AUTO: 459 K/UL — HIGH (ref 150–400)
POTASSIUM SERPL-SCNC: 4.8 MMOL/L
PROT SERPL-MCNC: 7.7 G/DL
RBC # BLD: 4.51 M/UL — SIGNIFICANT CHANGE UP (ref 3.8–5.2)
RBC # FLD: 12.4 % — SIGNIFICANT CHANGE UP (ref 10.3–14.5)
SODIUM SERPL-SCNC: 134 MMOL/L
WBC # BLD: 21.1 K/UL — HIGH (ref 3.8–10.5)
WBC # FLD AUTO: 21.1 K/UL — HIGH (ref 3.8–10.5)

## 2019-01-30 PROCEDURE — 99245 OFF/OP CONSLTJ NEW/EST HI 55: CPT

## 2019-01-30 RX ORDER — METOCLOPRAMIDE 10 MG/1
10 TABLET ORAL
Qty: 30 | Refills: 3 | Status: ACTIVE | COMMUNITY
Start: 2019-01-30 | End: 1900-01-01

## 2019-01-30 RX ORDER — LIDOCAINE AND PRILOCAINE 25; 25 MG/G; MG/G
2.5-2.5 CREAM TOPICAL
Qty: 1 | Refills: 1 | Status: ACTIVE | COMMUNITY
Start: 2019-01-30 | End: 1900-01-01

## 2019-01-31 ENCOUNTER — APPOINTMENT (OUTPATIENT)
Age: 69
End: 2019-01-31

## 2019-01-31 LAB — CEA SERPL-MCNC: 2.1 NG/ML

## 2019-02-04 ENCOUNTER — FORM ENCOUNTER (OUTPATIENT)
Age: 69
End: 2019-02-04

## 2019-02-04 LAB
CANCER AG19-9 SERPL-ACNC: 1414.6 U/ML
HBV CORE IGG+IGM SER QL: REACTIVE
HBV SURFACE AB SER QL: REACTIVE
HBV SURFACE AG SER QL: NONREACTIVE
HCV AB SER QL: NONREACTIVE
HCV S/CO RATIO: 0.3 S/CO

## 2019-02-04 NOTE — HISTORY OF PRESENT ILLNESS
[Disease: _____________________] : Disease: [unfilled] [AJCC Stage: ____] : AJCC Stage: [unfilled] [de-identified] : Mrs Neri is a 68 year old female with no significant past medical history presenting to the office for an initial consultation carcinoma of unknown primary.  She was in [place] from Nov - Dec 2018 and while there noted ongoing weight loss. Lost total of 35 lbs in 3-4 mos. This was a/w decrease in appetite and fatigue. Attributed some of the weight loss to exercising. When she returned on Dec 10th, she developed fevers Tmax 103F and abdominal pain. She was evaluated by her PCP who noted a WBC 19 and initiated an infectious work up and referral to ID. ID requested a CT A/P which was performed on 1/12/19. This showed numerous hypodense hepatic lesions, largest is 11 cm, R adnexal dermoid in the left uterine body. Both infectious and neoplastic etiologies possible. Concern was for liver abscess and pt was directed to the [facility] ER. Admitted to [facility] 1/15-1/18/19. Noted to have persistent leukocytosis. Infectious work up negative. Underwent biopsy of liver lesion which was c/w malignancy. Referred to medical oncology. \par \par \par \par  [de-identified] : poorly differentiated  [de-identified] : CA 19-9 1122 AFP 1.6 [de-identified] : CK19, CK7(focal): positive\par HEPAR, TTF1, LINA-3, CK20, PAX-8, ER, ARGINASE, SYNAPTOPHYSIN,\par CHROMOGRANIN, CD56, INHIBIN: all are negative.\par Ki67: aproaximally 20-25%\par  [de-identified] : Pt presents for initial consultation accompanied by  and son. c/o RUQ pain, worse with breathing. Also 10 days of pruritus over the back without rash. \par Eating <1000 calories per day. Ongoing weight loss. Takes Tylenol but doesn’t really help. Does not have any other pain meds at home.  \par Low energy level. Does not need help with ADLs. Not doing much at home anymore. Does not go to [facility], food shopping. \par Sleep well at night. Takes naps during the day. \par Is not going out with friends anymore due to fear of pain. Denies feeling hopeless, helpless\par Low grade temps every couple of days, generally around 100. Jan 19 was 103. \par \par \par

## 2019-02-04 NOTE — REVIEW OF SYSTEMS
[Recent Change In Weight] : ~T recent weight change [Abdominal Pain] : abdominal pain [Constipation] : constipation [Negative] : Allergic/Immunologic [Fever] : fever [Fatigue] : fatigue [FreeTextEntry7] : + belching+

## 2019-02-04 NOTE — PHYSICAL EXAM
[Restricted in physically strenuous activity but ambulatory and able to carry out work of a light or sedentary nature] : Status 1- Restricted in physically strenuous activity but ambulatory and able to carry out work of a light or sedentary nature, e.g., light house work, office work [Normal] : affect appropriate [de-identified] : c

## 2019-02-05 ENCOUNTER — APPOINTMENT (OUTPATIENT)
Dept: NUCLEAR MEDICINE | Facility: IMAGING CENTER | Age: 69
End: 2019-02-05
Payer: COMMERCIAL

## 2019-02-05 ENCOUNTER — OUTPATIENT (OUTPATIENT)
Dept: OUTPATIENT SERVICES | Facility: HOSPITAL | Age: 69
LOS: 1 days | End: 2019-02-05
Payer: COMMERCIAL

## 2019-02-05 DIAGNOSIS — C80.1 MALIGNANT (PRIMARY) NEOPLASM, UNSPECIFIED: ICD-10-CM

## 2019-02-05 PROCEDURE — 78815 PET IMAGE W/CT SKULL-THIGH: CPT

## 2019-02-05 PROCEDURE — A9552: CPT

## 2019-02-05 PROCEDURE — 78815 PET IMAGE W/CT SKULL-THIGH: CPT | Mod: 26,PI

## 2019-02-07 ENCOUNTER — APPOINTMENT (OUTPATIENT)
Dept: ENDOVASCULAR SURGERY | Facility: CLINIC | Age: 69
End: 2019-02-07
Payer: COMMERCIAL

## 2019-02-07 PROCEDURE — 76937 US GUIDE VASCULAR ACCESS: CPT

## 2019-02-07 PROCEDURE — 36561 INSERT TUNNELED CV CATH: CPT

## 2019-02-07 PROCEDURE — 77001 FLUOROGUIDE FOR VEIN DEVICE: CPT

## 2019-02-11 ENCOUNTER — APPOINTMENT (OUTPATIENT)
Age: 69
End: 2019-02-11

## 2019-02-11 ENCOUNTER — RESULT REVIEW (OUTPATIENT)
Age: 69
End: 2019-02-11

## 2019-02-11 ENCOUNTER — LABORATORY RESULT (OUTPATIENT)
Age: 69
End: 2019-02-11

## 2019-02-11 ENCOUNTER — APPOINTMENT (OUTPATIENT)
Dept: HEMATOLOGY ONCOLOGY | Facility: CLINIC | Age: 69
End: 2019-02-11
Payer: COMMERCIAL

## 2019-02-11 VITALS
OXYGEN SATURATION: 98 % | RESPIRATION RATE: 16 BRPM | WEIGHT: 132.28 LBS | HEART RATE: 105 BPM | TEMPERATURE: 98.5 F | DIASTOLIC BLOOD PRESSURE: 82 MMHG | SYSTOLIC BLOOD PRESSURE: 132 MMHG | BODY MASS INDEX: 25.97 KG/M2

## 2019-02-11 LAB
BASOPHILS # BLD AUTO: 0.1 K/UL — SIGNIFICANT CHANGE UP (ref 0–0.2)
BASOPHILS NFR BLD AUTO: 0.3 % — SIGNIFICANT CHANGE UP (ref 0–2)
EOSINOPHIL # BLD AUTO: 0.3 K/UL — SIGNIFICANT CHANGE UP (ref 0–0.5)
EOSINOPHIL NFR BLD AUTO: 1.6 % — SIGNIFICANT CHANGE UP (ref 0–6)
HCT VFR BLD CALC: 37.9 % — SIGNIFICANT CHANGE UP (ref 34.5–45)
HGB BLD-MCNC: 12.6 G/DL — SIGNIFICANT CHANGE UP (ref 11.5–15.5)
LYMPHOCYTES # BLD AUTO: 13.7 % — SIGNIFICANT CHANGE UP (ref 13–44)
LYMPHOCYTES # BLD AUTO: 2.7 K/UL — SIGNIFICANT CHANGE UP (ref 1–3.3)
MCHC RBC-ENTMCNC: 28.9 PG — SIGNIFICANT CHANGE UP (ref 27–34)
MCHC RBC-ENTMCNC: 33.3 G/DL — SIGNIFICANT CHANGE UP (ref 32–36)
MCV RBC AUTO: 86.9 FL — SIGNIFICANT CHANGE UP (ref 80–100)
MONOCYTES # BLD AUTO: 1.4 K/UL — HIGH (ref 0–0.9)
MONOCYTES NFR BLD AUTO: 6.9 % — SIGNIFICANT CHANGE UP (ref 2–14)
NEUTROPHILS # BLD AUTO: 15.4 K/UL — HIGH (ref 1.8–7.4)
NEUTROPHILS NFR BLD AUTO: 77.4 % — HIGH (ref 43–77)
PLATELET # BLD AUTO: 374 K/UL — SIGNIFICANT CHANGE UP (ref 150–400)
RBC # BLD: 4.37 M/UL — SIGNIFICANT CHANGE UP (ref 3.8–5.2)
RBC # FLD: 12.5 % — SIGNIFICANT CHANGE UP (ref 10.3–14.5)
WBC # BLD: 19.9 K/UL — HIGH (ref 3.8–10.5)
WBC # FLD AUTO: 19.9 K/UL — HIGH (ref 3.8–10.5)

## 2019-02-11 PROCEDURE — 99214 OFFICE O/P EST MOD 30 MIN: CPT

## 2019-02-11 NOTE — HISTORY OF PRESENT ILLNESS
[Disease: _____________________] : Disease: [unfilled] [AJCC Stage: ____] : AJCC Stage: [unfilled] [Therapy: ___] : Therapy: [unfilled] [Cycle: ___] : Cycle: [unfilled] [Day: ___] : Day: [unfilled] [de-identified] : Mrs Neri is a 68 year old female with no significant past medical history presenting to the office for an initial consultation carcinoma of unknown primary.  She was in [place] from Nov - Dec 2018 and while there noted ongoing weight loss. Lost total of 35 lbs in 3-4 mos. This was a/w decrease in appetite and fatigue. Attributed some of the weight loss to exercising. When she returned on Dec 10th, she developed fevers Tmax 103F and abdominal pain. She was evaluated by her PCP who noted a WBC 19 and initiated an infectious work up and referral to ID. ID requested a CT A/P which was performed on 1/12/19. This showed numerous hypodense hepatic lesions, largest is 11 cm, R adnexal dermoid in the left uterine body. Both infectious and neoplastic etiologies possible. Concern was for liver abscess and pt was directed to the [facility] ER. Admitted to [facility] 1/15-1/18/19. Noted to have persistent leukocytosis. Infectious work up negative. Underwent biopsy of liver lesion which was c/w malignancy. Referred to medical oncology. \par \par 2/5/19 PET/CT: no primary is elucidated on this study: pericardial implant, upper RP LN, multiple hepatic mets\par 2/11/19: C1 FOLFOX (20% dose reduction due to frailty)\par \par \par \par \par  [de-identified] : poorly differentiated  [de-identified] : CA 19-9 1122 AFP 1.6 [de-identified] : CK19, CK7(focal): positive\par HEPAR, TTF1, LINA-3, CK20, PAX-8, ER, ARGINASE, SYNAPTOPHYSIN,\par CHROMOGRANIN, CD56, INHIBIN: all are negative.\par Ki67: aproaximally 20-25%\par  [de-identified] : c/o maculopapular rash on abdomen, back and arms, pruritus. Has not tried anything for this yet. Rash has been present for weeks, but pt thinks it may be getting worse. Has not started Marinol yet. Appetite is still poor, but is able to maintain weight. No further fevers. \par Taking oxycodone PRN for pain which helps. Pain is currently a 4-5. Denies any constipation. Energy level is ok, except when she has pain.

## 2019-02-11 NOTE — PHYSICAL EXAM
[Restricted in physically strenuous activity but ambulatory and able to carry out work of a light or sedentary nature] : Status 1- Restricted in physically strenuous activity but ambulatory and able to carry out work of a light or sedentary nature, e.g., light house work, office work [Normal] : affect appropriate [de-identified] : maculopapular rash noted on abdomen, flanks, and ventral part of b/l UE

## 2019-02-11 NOTE — CONSULT LETTER
[Dear  ___] : Dear  [unfilled], [Courtesy Letter:] : I had the pleasure of seeing your patient, [unfilled], in my office today. [Please see my note below.] : Please see my note below. [Consult Closing:] : Thank you very much for allowing me to participate in the care of this patient.  If you have any questions, please do not hesitate to contact me. [Sincerely,] : Sincerely, [FreeTextEntry3] : Lanny Scruggs D.O. \par Attending Physician \par [facility] of Medical Oncology and Hematology\par  \par [facility] \par Tel: [phone]\par Fax: [phone]\par

## 2019-02-11 NOTE — REVIEW OF SYSTEMS
[Fatigue] : fatigue [Abdominal Pain] : abdominal pain [Skin Rash] : skin rash [Negative] : Allergic/Immunologic [FreeTextEntry4] : + dysgeusia

## 2019-02-12 DIAGNOSIS — R11.2 NAUSEA WITH VOMITING, UNSPECIFIED: ICD-10-CM

## 2019-02-12 DIAGNOSIS — Z51.11 ENCOUNTER FOR ANTINEOPLASTIC CHEMOTHERAPY: ICD-10-CM

## 2019-02-13 ENCOUNTER — APPOINTMENT (OUTPATIENT)
Age: 69
End: 2019-02-13

## 2019-02-13 ENCOUNTER — APPOINTMENT (OUTPATIENT)
Dept: DERMATOLOGY | Facility: CLINIC | Age: 69
End: 2019-02-13
Payer: COMMERCIAL

## 2019-02-13 VITALS
BODY MASS INDEX: 26.61 KG/M2 | SYSTOLIC BLOOD PRESSURE: 144 MMHG | HEIGHT: 59 IN | WEIGHT: 132 LBS | DIASTOLIC BLOOD PRESSURE: 84 MMHG

## 2019-02-13 PROCEDURE — 99243 OFF/OP CNSLTJ NEW/EST LOW 30: CPT

## 2019-02-13 RX ORDER — TRIAMCINOLONE ACETONIDE 1 MG/G
0.1 OINTMENT TOPICAL
Qty: 1 | Refills: 1 | Status: ACTIVE | COMMUNITY
Start: 2019-02-13 | End: 1900-01-01

## 2019-02-13 NOTE — HISTORY OF PRESENT ILLNESS
[FreeTextEntry1] : rash [de-identified] : Referred by: Dr. 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 No clear exposures. Started spreading to back, upper chest and arms. Legs are spared. \par Has had ongoing rash in pannus area X 1 mo, itchy. Had a port-a-cath placed 2/7 and then started developing itchy areas on her back, upper chest, and arms for the past few days. Did receive 1 dose of ceftriaxone on this day. Also has had recent contrast-based imaging per patient. \par No eye pain, throat pain, pain with urination or defecation.   No antecedent URI symptoms.

## 2019-02-13 NOTE — PHYSICAL EXAM
[Alert] : alert [Oriented x 3] : ~L oriented x 3 [Well Nourished] : well nourished [Conjunctiva Non-injected] : conjunctiva non-injected [No Visual Lymphadenopathy] : no visual  lymphadenopathy [No Clubbing] : no clubbing [No Edema] : no edema [No Bromhidrosis] : no bromhidrosis [No Chromhidrosis] : no chromhidrosis [FreeTextEntry3] : pink plaques in coalescent into well defined plaque on the R upper chest\par \par pink papules on volar wrists and A/C fossa, forearms, diffuse pink urticarial papules on back\par \par chest/abdomen mostly spared\par \par

## 2019-02-13 NOTE — CONSULT LETTER
[Dear  ___] : Dear  [unfilled], [Consult Letter:] : I had the pleasure of evaluating your patient, [unfilled]. [Please see my note below.] : Please see my note below. [Consult Closing:] : Thank you very much for allowing me to participate in the care of this patient.  If you have any questions, please do not hesitate to contact me. [Sincerely,] : Sincerely, [FreeTextEntry3] : Laura Markham MD

## 2019-02-14 ENCOUNTER — INPATIENT (INPATIENT)
Facility: HOSPITAL | Age: 69
LOS: 5 days | Discharge: HOME | DRG: 871 | End: 2019-02-20
Attending: STUDENT IN AN ORGANIZED HEALTH CARE EDUCATION/TRAINING PROGRAM | Admitting: INTERNAL MEDICINE
Payer: MEDICARE

## 2019-02-14 ENCOUNTER — MESSAGE (OUTPATIENT)
Age: 69
End: 2019-02-14

## 2019-02-14 VITALS
TEMPERATURE: 103 F | DIASTOLIC BLOOD PRESSURE: 84 MMHG | WEIGHT: 132.06 LBS | HEIGHT: 59 IN | HEART RATE: 131 BPM | OXYGEN SATURATION: 95 % | SYSTOLIC BLOOD PRESSURE: 144 MMHG | RESPIRATION RATE: 20 BRPM

## 2019-02-14 LAB
ALBUMIN SERPL ELPH-MCNC: 3.5 G/DL — SIGNIFICANT CHANGE UP (ref 3.3–5)
ALP SERPL-CCNC: 164 U/L — HIGH (ref 40–120)
ALT FLD-CCNC: 16 U/L — SIGNIFICANT CHANGE UP (ref 10–45)
ANION GAP SERPL CALC-SCNC: 13 MMOL/L — SIGNIFICANT CHANGE UP (ref 5–17)
AST SERPL-CCNC: 62 U/L — HIGH (ref 10–40)
BASE EXCESS BLDV CALC-SCNC: 2.5 MMOL/L — HIGH (ref -2–2)
BASOPHILS # BLD AUTO: 0.1 K/UL — SIGNIFICANT CHANGE UP (ref 0–0.2)
BASOPHILS NFR BLD AUTO: 0.3 % — SIGNIFICANT CHANGE UP (ref 0–2)
BILIRUB SERPL-MCNC: 1 MG/DL — SIGNIFICANT CHANGE UP (ref 0.2–1.2)
BUN SERPL-MCNC: 8 MG/DL — SIGNIFICANT CHANGE UP (ref 7–23)
CA-I SERPL-SCNC: 1.16 MMOL/L — SIGNIFICANT CHANGE UP (ref 1.12–1.3)
CALCIUM SERPL-MCNC: 9.3 MG/DL — SIGNIFICANT CHANGE UP (ref 8.4–10.5)
CHLORIDE BLDV-SCNC: 99 MMOL/L — SIGNIFICANT CHANGE UP (ref 96–108)
CHLORIDE SERPL-SCNC: 92 MMOL/L — LOW (ref 96–108)
CO2 BLDV-SCNC: 28 MMOL/L — SIGNIFICANT CHANGE UP (ref 22–30)
CO2 SERPL-SCNC: 22 MMOL/L — SIGNIFICANT CHANGE UP (ref 22–31)
CREAT SERPL-MCNC: 0.48 MG/DL — LOW (ref 0.5–1.3)
EOSINOPHIL # BLD AUTO: 0.2 K/UL — SIGNIFICANT CHANGE UP (ref 0–0.5)
EOSINOPHIL NFR BLD AUTO: 1.3 % — SIGNIFICANT CHANGE UP (ref 0–6)
GAS PNL BLDV: 127 MMOL/L — LOW (ref 136–145)
GAS PNL BLDV: SIGNIFICANT CHANGE UP
GLUCOSE BLDV-MCNC: 100 MG/DL — HIGH (ref 70–99)
GLUCOSE SERPL-MCNC: 105 MG/DL — HIGH (ref 70–99)
HCO3 BLDV-SCNC: 27 MMOL/L — SIGNIFICANT CHANGE UP (ref 21–29)
HCT VFR BLD CALC: 36.9 % — SIGNIFICANT CHANGE UP (ref 34.5–45)
HCT VFR BLDA CALC: 39 % — SIGNIFICANT CHANGE UP (ref 39–50)
HGB BLD CALC-MCNC: 12.5 G/DL — SIGNIFICANT CHANGE UP (ref 11.5–15.5)
HGB BLD-MCNC: 12.4 G/DL — SIGNIFICANT CHANGE UP (ref 11.5–15.5)
LACTATE BLDV-MCNC: 1.8 MMOL/L — SIGNIFICANT CHANGE UP (ref 0.7–2)
LYMPHOCYTES # BLD AUTO: 1.2 K/UL — SIGNIFICANT CHANGE UP (ref 1–3.3)
LYMPHOCYTES # BLD AUTO: 6.6 % — LOW (ref 13–44)
MCHC RBC-ENTMCNC: 29.2 PG — SIGNIFICANT CHANGE UP (ref 27–34)
MCHC RBC-ENTMCNC: 33.6 GM/DL — SIGNIFICANT CHANGE UP (ref 32–36)
MCV RBC AUTO: 87 FL — SIGNIFICANT CHANGE UP (ref 80–100)
MONOCYTES # BLD AUTO: 0 K/UL — SIGNIFICANT CHANGE UP (ref 0–0.9)
MONOCYTES NFR BLD AUTO: 0.1 % — LOW (ref 2–14)
NEUTROPHILS # BLD AUTO: 16.9 K/UL — HIGH (ref 1.8–7.4)
NEUTROPHILS NFR BLD AUTO: 91.6 % — HIGH (ref 43–77)
PCO2 BLDV: 42 MMHG — SIGNIFICANT CHANGE UP (ref 35–50)
PH BLDV: 7.42 — SIGNIFICANT CHANGE UP (ref 7.35–7.45)
PLATELET # BLD AUTO: 353 K/UL — SIGNIFICANT CHANGE UP (ref 150–400)
PO2 BLDV: 21 MMHG — LOW (ref 25–45)
POTASSIUM BLDV-SCNC: 4.6 MMOL/L — SIGNIFICANT CHANGE UP (ref 3.5–5.3)
POTASSIUM SERPL-MCNC: 4.5 MMOL/L — SIGNIFICANT CHANGE UP (ref 3.5–5.3)
POTASSIUM SERPL-SCNC: 4.5 MMOL/L — SIGNIFICANT CHANGE UP (ref 3.5–5.3)
PROT SERPL-MCNC: 7.5 G/DL — SIGNIFICANT CHANGE UP (ref 6–8.3)
RAPID RVP RESULT: SIGNIFICANT CHANGE UP
RBC # BLD: 4.24 M/UL — SIGNIFICANT CHANGE UP (ref 3.8–5.2)
RBC # FLD: 12.5 % — SIGNIFICANT CHANGE UP (ref 10.3–14.5)
SAO2 % BLDV: 32 % — LOW (ref 67–88)
SODIUM SERPL-SCNC: 127 MMOL/L — LOW (ref 135–145)
WBC # BLD: 18.5 K/UL — HIGH (ref 3.8–10.5)
WBC # FLD AUTO: 18.5 K/UL — HIGH (ref 3.8–10.5)

## 2019-02-14 PROCEDURE — 99291 CRITICAL CARE FIRST HOUR: CPT

## 2019-02-14 PROCEDURE — 71045 X-RAY EXAM CHEST 1 VIEW: CPT | Mod: 26

## 2019-02-14 RX ORDER — PIPERACILLIN AND TAZOBACTAM 4; .5 G/20ML; G/20ML
3.38 INJECTION, POWDER, LYOPHILIZED, FOR SOLUTION INTRAVENOUS ONCE
Qty: 0 | Refills: 0 | Status: COMPLETED | OUTPATIENT
Start: 2019-02-14 | End: 2019-02-14

## 2019-02-14 RX ORDER — IBUPROFEN 200 MG
400 TABLET ORAL ONCE
Qty: 0 | Refills: 0 | Status: COMPLETED | OUTPATIENT
Start: 2019-02-14 | End: 2019-02-14

## 2019-02-14 RX ORDER — ONDANSETRON 8 MG/1
4 TABLET, FILM COATED ORAL ONCE
Qty: 0 | Refills: 0 | Status: COMPLETED | OUTPATIENT
Start: 2019-02-14 | End: 2019-02-14

## 2019-02-14 RX ORDER — VANCOMYCIN HCL 1 G
1000 VIAL (EA) INTRAVENOUS ONCE
Qty: 0 | Refills: 0 | Status: COMPLETED | OUTPATIENT
Start: 2019-02-14 | End: 2019-02-15

## 2019-02-14 RX ORDER — SODIUM CHLORIDE 9 MG/ML
1000 INJECTION INTRAMUSCULAR; INTRAVENOUS; SUBCUTANEOUS ONCE
Qty: 0 | Refills: 0 | Status: COMPLETED | OUTPATIENT
Start: 2019-02-14 | End: 2019-02-14

## 2019-02-14 RX ORDER — KETOROLAC TROMETHAMINE 30 MG/ML
15 SYRINGE (ML) INJECTION ONCE
Qty: 0 | Refills: 0 | Status: DISCONTINUED | OUTPATIENT
Start: 2019-02-14 | End: 2019-02-14

## 2019-02-14 RX ADMIN — PIPERACILLIN AND TAZOBACTAM 200 GRAM(S): 4; .5 INJECTION, POWDER, LYOPHILIZED, FOR SOLUTION INTRAVENOUS at 22:23

## 2019-02-14 RX ADMIN — SODIUM CHLORIDE 1000 MILLILITER(S): 9 INJECTION INTRAMUSCULAR; INTRAVENOUS; SUBCUTANEOUS at 22:01

## 2019-02-14 RX ADMIN — ONDANSETRON 4 MILLIGRAM(S): 8 TABLET, FILM COATED ORAL at 22:46

## 2019-02-14 RX ADMIN — Medication 15 MILLIGRAM(S): at 22:46

## 2019-02-14 RX ADMIN — Medication 400 MILLIGRAM(S): at 22:00

## 2019-02-14 NOTE — ED ADULT NURSE NOTE - NSIMPLEMENTINTERV_GEN_ALL_ED
Implemented All Universal Safety Interventions:  [place] to call system. Call bell, personal items and telephone within reach. Instruct patient to call for assistance. Room bathroom lighting operational. Non-slip footwear when patient is off stretcher. Physically safe environment: no spills, clutter or unnecessary equipment. Stretcher in lowest position, wheels locked, appropriate side rails in place.

## 2019-02-14 NOTE — ED PROVIDER NOTE - CLINICAL SUMMARY MEDICAL DECISION MAKING FREE TEXT BOX
fever on chemo; possibly viral/flu related; tachycardic and febrile, but normotensive, no other focal sx; do not think this patient is septic/has a history of tumor-related fevers in the past but will give 30cc/kg to be safe, blood cultures, broad spectrum abx (vanc/zosyn), admit for further management fever on chemo; possibly viral/flu related; tachycardic and febrile, but normotensive, no other focal sx; do not think this patient is septic (has a history of tumor-related fevers in the past, and I believe she is tachy primarily 2/2 the fever) but will give 30cc/kg to be safe, blood cultures, broad spectrum abx (vanc/zosyn), admit for further management

## 2019-02-14 NOTE — ED PROVIDER NOTE - PHYSICAL EXAMINATION
GEN: calm, cooperative, ENT: mucous membranes moist, HEAD: NCAT, CV: S1 S2 tachycardic, RESP: no respiratory distress, ABD: no abdominal TTP, MSK: moves all extremities, NEURO: awake, alert, oriented, PSYCH: affect normal, SKIN: necklace-like scattered erythematous macular rash

## 2019-02-14 NOTE — ED PROVIDER NOTE - OBJECTIVE STATEMENT
67 yo female, hx of metastatic malignancy of unknown primary, possibly GI, recent port placement, last FOLFOX 3d ago, p/w fever @ home to 103, intermittent moderate chills.  has had existing rash, seen by derm, otherwise no new focal findings, no SOB, no vomiting/diarrhea, no sick contacts. 69 yo female, hx of metastatic malignancy of unknown primary, possibly GI, recent port placement, last FOLFOX 3d ago, p/w fever @ home to 103, intermittent moderate chills.  has had existing rash, seen by derm, otherwise no new focal findings, no SOB, no vomiting/diarrhea, no sick contacts.  has also had history of tumor-related fevers in the last few weeks without infectious etiology.

## 2019-02-14 NOTE — ED PROVIDER NOTE - INTERPRETATION
EKG reviewed for rate, rhythm, axis, intervals and segments, including QRS morphology, P wave appearance T wave appearance, ID interval, and QT interval.  I find the EKG to be unremarkable in all of these regards except as follows: sinus tachycardia

## 2019-02-14 NOTE — ED ADULT NURSE NOTE - OBJECTIVE STATEMENT
69 y/o female presented to the ED via EMS from home. report from pt  that patient has been running fevers. pt has Stage 4 liver CA. Mediport placed on Friday. pt has rash due to possible Rocephin administration. pt had first chemo treatment this Monday. had fever since. pt has been getting Tylenol from  for fevers over the past several days. was concerned due to fever of 103 and chills. reported shaking.  pt denies any SOB, no vomiting/diarrhea, no sick contacts.  next to bedside. pt is on room air with no signs of distress. awaiting MD dispo.

## 2019-02-15 DIAGNOSIS — Z29.9 ENCOUNTER FOR PROPHYLACTIC MEASURES, UNSPECIFIED: ICD-10-CM

## 2019-02-15 DIAGNOSIS — R50.9 FEVER, UNSPECIFIED: ICD-10-CM

## 2019-02-15 DIAGNOSIS — C79.9 SECONDARY MALIGNANT NEOPLASM OF UNSPECIFIED SITE: ICD-10-CM

## 2019-02-15 DIAGNOSIS — R65.10 SYSTEMIC INFLAMMATORY RESPONSE SYNDROME (SIRS) OF NON-INFECTIOUS ORIGIN WITHOUT ACUTE ORGAN DYSFUNCTION: ICD-10-CM

## 2019-02-15 DIAGNOSIS — R78.81 BACTEREMIA: ICD-10-CM

## 2019-02-15 DIAGNOSIS — E87.1 HYPO-OSMOLALITY AND HYPONATREMIA: ICD-10-CM

## 2019-02-15 DIAGNOSIS — R21 RASH AND OTHER NONSPECIFIC SKIN ERUPTION: ICD-10-CM

## 2019-02-15 DIAGNOSIS — I95.9 HYPOTENSION, UNSPECIFIED: ICD-10-CM

## 2019-02-15 DIAGNOSIS — C80.1 MALIGNANT (PRIMARY) NEOPLASM, UNSPECIFIED: ICD-10-CM

## 2019-02-15 LAB
ALBUMIN SERPL ELPH-MCNC: 2.6 G/DL — LOW (ref 3.3–5)
ALP SERPL-CCNC: 116 U/L — SIGNIFICANT CHANGE UP (ref 40–120)
ALT FLD-CCNC: 13 U/L — SIGNIFICANT CHANGE UP (ref 10–45)
ANION GAP SERPL CALC-SCNC: 12 MMOL/L — SIGNIFICANT CHANGE UP (ref 5–17)
ANION GAP SERPL CALC-SCNC: 13 MMOL/L — SIGNIFICANT CHANGE UP (ref 5–17)
APPEARANCE UR: CLEAR — SIGNIFICANT CHANGE UP
APTT BLD: 39.4 SEC — HIGH (ref 27.5–36.3)
AST SERPL-CCNC: 51 U/L — HIGH (ref 10–40)
BACTERIA # UR AUTO: NEGATIVE — SIGNIFICANT CHANGE UP
BASOPHILS # BLD AUTO: 0 K/UL — SIGNIFICANT CHANGE UP (ref 0–0.2)
BASOPHILS NFR BLD AUTO: 0 % — SIGNIFICANT CHANGE UP (ref 0–2)
BILIRUB SERPL-MCNC: 0.7 MG/DL — SIGNIFICANT CHANGE UP (ref 0.2–1.2)
BILIRUB UR-MCNC: NEGATIVE — SIGNIFICANT CHANGE UP
BUN SERPL-MCNC: 11 MG/DL — SIGNIFICANT CHANGE UP (ref 7–23)
BUN SERPL-MCNC: 14 MG/DL — SIGNIFICANT CHANGE UP (ref 7–23)
CALCIUM SERPL-MCNC: 7.3 MG/DL — LOW (ref 8.4–10.5)
CALCIUM SERPL-MCNC: 7.4 MG/DL — LOW (ref 8.4–10.5)
CHLORIDE SERPL-SCNC: 101 MMOL/L — SIGNIFICANT CHANGE UP (ref 96–108)
CHLORIDE SERPL-SCNC: 102 MMOL/L — SIGNIFICANT CHANGE UP (ref 96–108)
CO2 SERPL-SCNC: 15 MMOL/L — LOW (ref 22–31)
CO2 SERPL-SCNC: 16 MMOL/L — LOW (ref 22–31)
COLOR SPEC: YELLOW — SIGNIFICANT CHANGE UP
CORTIS AM PEAK SERPL-MCNC: 20.3 UG/DL — HIGH (ref 6–18.4)
CORTIS AM PEAK SERPL-MCNC: 21.6 UG/DL — HIGH (ref 6–18.4)
CREAT ?TM UR-MCNC: 116 MG/DL — SIGNIFICANT CHANGE UP
CREAT SERPL-MCNC: 0.85 MG/DL — SIGNIFICANT CHANGE UP (ref 0.5–1.3)
CREAT SERPL-MCNC: 0.89 MG/DL — SIGNIFICANT CHANGE UP (ref 0.5–1.3)
DIFF PNL FLD: NEGATIVE — SIGNIFICANT CHANGE UP
E COLI DNA BLD POS QL NAA+NON-PROBE: SIGNIFICANT CHANGE UP
EOSINOPHIL # BLD AUTO: 0.23 K/UL — SIGNIFICANT CHANGE UP (ref 0–0.5)
EOSINOPHIL NFR BLD AUTO: 0.8 % — SIGNIFICANT CHANGE UP (ref 0–6)
EPI CELLS # UR: 3 /HPF — SIGNIFICANT CHANGE UP
GLUCOSE SERPL-MCNC: 100 MG/DL — HIGH (ref 70–99)
GLUCOSE SERPL-MCNC: 89 MG/DL — SIGNIFICANT CHANGE UP (ref 70–99)
GLUCOSE UR QL: NEGATIVE — SIGNIFICANT CHANGE UP
GRAM STN FLD: SIGNIFICANT CHANGE UP
HCT VFR BLD CALC: 32.2 % — LOW (ref 34.5–45)
HGB BLD-MCNC: 10 G/DL — LOW (ref 11.5–15.5)
HYALINE CASTS # UR AUTO: 1 /LPF — SIGNIFICANT CHANGE UP (ref 0–2)
INR BLD: 1.43 RATIO — HIGH (ref 0.88–1.16)
KETONES UR-MCNC: NEGATIVE — SIGNIFICANT CHANGE UP
LEUKOCYTE ESTERASE UR-ACNC: NEGATIVE — SIGNIFICANT CHANGE UP
LYMPHOCYTES # BLD AUTO: 1.72 K/UL — SIGNIFICANT CHANGE UP (ref 1–3.3)
LYMPHOCYTES # BLD AUTO: 5.9 % — LOW (ref 13–44)
MAGNESIUM SERPL-MCNC: 1.4 MG/DL — LOW (ref 1.6–2.6)
MAGNESIUM SERPL-MCNC: 2.2 MG/DL — SIGNIFICANT CHANGE UP (ref 1.6–2.6)
MCHC RBC-ENTMCNC: 28.2 PG — SIGNIFICANT CHANGE UP (ref 27–34)
MCHC RBC-ENTMCNC: 31.1 GM/DL — LOW (ref 32–36)
MCV RBC AUTO: 91 FL — SIGNIFICANT CHANGE UP (ref 80–100)
METHOD TYPE: SIGNIFICANT CHANGE UP
MONOCYTES # BLD AUTO: 0.23 K/UL — SIGNIFICANT CHANGE UP (ref 0–0.9)
MONOCYTES NFR BLD AUTO: 0.8 % — LOW (ref 2–14)
NEUTROPHILS # BLD AUTO: 26.51 K/UL — HIGH (ref 1.8–7.4)
NEUTROPHILS NFR BLD AUTO: 88.3 % — HIGH (ref 43–77)
NITRITE UR-MCNC: NEGATIVE — SIGNIFICANT CHANGE UP
OSMOLALITY SERPL: 275 MOS/KG — SIGNIFICANT CHANGE UP (ref 275–300)
OSMOLALITY UR: 383 MOS/KG — SIGNIFICANT CHANGE UP (ref 300–900)
PH UR: 7 — SIGNIFICANT CHANGE UP (ref 5–8)
PHOSPHATE SERPL-MCNC: 2.7 MG/DL — SIGNIFICANT CHANGE UP (ref 2.5–4.5)
PHOSPHATE SERPL-MCNC: 2.8 MG/DL — SIGNIFICANT CHANGE UP (ref 2.5–4.5)
PLATELET # BLD AUTO: 304 K/UL — SIGNIFICANT CHANGE UP (ref 150–400)
POTASSIUM SERPL-MCNC: 3.8 MMOL/L — SIGNIFICANT CHANGE UP (ref 3.5–5.3)
POTASSIUM SERPL-MCNC: 4 MMOL/L — SIGNIFICANT CHANGE UP (ref 3.5–5.3)
POTASSIUM SERPL-SCNC: 3.8 MMOL/L — SIGNIFICANT CHANGE UP (ref 3.5–5.3)
POTASSIUM SERPL-SCNC: 4 MMOL/L — SIGNIFICANT CHANGE UP (ref 3.5–5.3)
PROT SERPL-MCNC: 6.1 G/DL — SIGNIFICANT CHANGE UP (ref 6–8.3)
PROT UR-MCNC: ABNORMAL
PROTHROM AB SERPL-ACNC: 16.5 SEC — HIGH (ref 10–12.9)
RBC # BLD: 3.54 M/UL — LOW (ref 3.8–5.2)
RBC # FLD: 14.6 % — HIGH (ref 10.3–14.5)
RBC CASTS # UR COMP ASSIST: 3 /HPF — SIGNIFICANT CHANGE UP (ref 0–4)
SODIUM SERPL-SCNC: 128 MMOL/L — LOW (ref 135–145)
SODIUM SERPL-SCNC: 131 MMOL/L — LOW (ref 135–145)
SODIUM UR-SCNC: <20 MMOL/L — SIGNIFICANT CHANGE UP
SP GR SPEC: 1.02 — SIGNIFICANT CHANGE UP (ref 1.01–1.02)
SPECIMEN SOURCE: SIGNIFICANT CHANGE UP
SPECIMEN SOURCE: SIGNIFICANT CHANGE UP
T4 AB SER-ACNC: 8.3 UG/DL — SIGNIFICANT CHANGE UP (ref 4.6–12)
TSH SERPL-MCNC: 3.37 UIU/ML — SIGNIFICANT CHANGE UP (ref 0.27–4.2)
UROBILINOGEN FLD QL: NEGATIVE — SIGNIFICANT CHANGE UP
WBC # BLD: 29.2 K/UL — HIGH (ref 3.8–10.5)
WBC # FLD AUTO: 29.2 K/UL — HIGH (ref 3.8–10.5)
WBC UR QL: 4 /HPF — SIGNIFICANT CHANGE UP (ref 0–5)

## 2019-02-15 PROCEDURE — 12345: CPT | Mod: NC,GC

## 2019-02-15 PROCEDURE — 99223 1ST HOSP IP/OBS HIGH 75: CPT | Mod: GC

## 2019-02-15 PROCEDURE — 71250 CT THORAX DX C-: CPT | Mod: 26

## 2019-02-15 PROCEDURE — 76705 ECHO EXAM OF ABDOMEN: CPT | Mod: 26

## 2019-02-15 PROCEDURE — 99223 1ST HOSP IP/OBS HIGH 75: CPT

## 2019-02-15 PROCEDURE — 74176 CT ABD & PELVIS W/O CONTRAST: CPT | Mod: 26

## 2019-02-15 RX ORDER — AZTREONAM 2 G
VIAL (EA) INJECTION
Qty: 0 | Refills: 0 | Status: DISCONTINUED | OUTPATIENT
Start: 2019-02-15 | End: 2019-02-15

## 2019-02-15 RX ORDER — MIDODRINE HYDROCHLORIDE 2.5 MG/1
10 TABLET ORAL THREE TIMES A DAY
Qty: 0 | Refills: 0 | Status: DISCONTINUED | OUTPATIENT
Start: 2019-02-15 | End: 2019-02-15

## 2019-02-15 RX ORDER — SODIUM CHLORIDE 9 MG/ML
500 INJECTION INTRAMUSCULAR; INTRAVENOUS; SUBCUTANEOUS ONCE
Qty: 0 | Refills: 0 | Status: COMPLETED | OUTPATIENT
Start: 2019-02-15 | End: 2019-02-15

## 2019-02-15 RX ORDER — CETIRIZINE HYDROCHLORIDE 10 MG/1
10 TABLET ORAL DAILY
Qty: 0 | Refills: 0 | Status: DISCONTINUED | OUTPATIENT
Start: 2019-02-15 | End: 2019-02-15

## 2019-02-15 RX ORDER — ACETAMINOPHEN 500 MG
1000 TABLET ORAL ONCE
Qty: 0 | Refills: 0 | Status: COMPLETED | OUTPATIENT
Start: 2019-02-15 | End: 2019-02-15

## 2019-02-15 RX ORDER — SODIUM CHLORIDE 9 MG/ML
1000 INJECTION INTRAMUSCULAR; INTRAVENOUS; SUBCUTANEOUS
Qty: 0 | Refills: 0 | Status: DISCONTINUED | OUTPATIENT
Start: 2019-02-15 | End: 2019-02-16

## 2019-02-15 RX ORDER — ENOXAPARIN SODIUM 100 MG/ML
40 INJECTION SUBCUTANEOUS EVERY 24 HOURS
Qty: 0 | Refills: 0 | Status: DISCONTINUED | OUTPATIENT
Start: 2019-02-15 | End: 2019-02-15

## 2019-02-15 RX ORDER — SODIUM CHLORIDE 9 MG/ML
1000 INJECTION INTRAMUSCULAR; INTRAVENOUS; SUBCUTANEOUS ONCE
Qty: 0 | Refills: 0 | Status: COMPLETED | OUTPATIENT
Start: 2019-02-15 | End: 2019-02-15

## 2019-02-15 RX ORDER — MAGNESIUM SULFATE 500 MG/ML
2 VIAL (ML) INJECTION ONCE
Qty: 0 | Refills: 0 | Status: COMPLETED | OUTPATIENT
Start: 2019-02-15 | End: 2019-02-15

## 2019-02-15 RX ORDER — VANCOMYCIN HCL 1 G
1000 VIAL (EA) INTRAVENOUS EVERY 24 HOURS
Qty: 0 | Refills: 0 | Status: DISCONTINUED | OUTPATIENT
Start: 2019-02-15 | End: 2019-02-15

## 2019-02-15 RX ORDER — ALBUMIN HUMAN 25 %
250 VIAL (ML) INTRAVENOUS ONCE
Qty: 0 | Refills: 0 | Status: COMPLETED | OUTPATIENT
Start: 2019-02-15 | End: 2019-02-15

## 2019-02-15 RX ORDER — DRONABINOL 2.5 MG
2.5 CAPSULE ORAL
Qty: 0 | Refills: 0 | Status: DISCONTINUED | OUTPATIENT
Start: 2019-02-15 | End: 2019-02-20

## 2019-02-15 RX ORDER — VANCOMYCIN HCL 1 G
1000 VIAL (EA) INTRAVENOUS EVERY 12 HOURS
Qty: 0 | Refills: 0 | Status: DISCONTINUED | OUTPATIENT
Start: 2019-02-15 | End: 2019-02-15

## 2019-02-15 RX ORDER — OXYCODONE HYDROCHLORIDE 5 MG/1
5 TABLET ORAL
Qty: 0 | Refills: 0 | Status: DISCONTINUED | OUTPATIENT
Start: 2019-02-15 | End: 2019-02-20

## 2019-02-15 RX ORDER — FAMOTIDINE 10 MG/ML
20 INJECTION INTRAVENOUS DAILY
Qty: 0 | Refills: 0 | Status: DISCONTINUED | OUTPATIENT
Start: 2019-02-15 | End: 2019-02-20

## 2019-02-15 RX ORDER — DIPHENHYDRAMINE HCL 50 MG
25 CAPSULE ORAL EVERY 6 HOURS
Qty: 0 | Refills: 0 | Status: DISCONTINUED | OUTPATIENT
Start: 2019-02-15 | End: 2019-02-20

## 2019-02-15 RX ORDER — DIPHENHYDRAMINE HCL 50 MG
25 CAPSULE ORAL ONCE
Qty: 0 | Refills: 0 | Status: DISCONTINUED | OUTPATIENT
Start: 2019-02-15 | End: 2019-02-15

## 2019-02-15 RX ORDER — MIDODRINE HYDROCHLORIDE 2.5 MG/1
10 TABLET ORAL EVERY 8 HOURS
Qty: 0 | Refills: 0 | Status: DISCONTINUED | OUTPATIENT
Start: 2019-02-15 | End: 2019-02-17

## 2019-02-15 RX ORDER — EPINEPHRINE 0.3 MG/.3ML
0.3 INJECTION INTRAMUSCULAR; SUBCUTANEOUS ONCE
Qty: 0 | Refills: 0 | Status: DISCONTINUED | OUTPATIENT
Start: 2019-02-15 | End: 2019-02-15

## 2019-02-15 RX ORDER — AZTREONAM 2 G
2000 VIAL (EA) INJECTION ONCE
Qty: 0 | Refills: 0 | Status: COMPLETED | OUTPATIENT
Start: 2019-02-15 | End: 2019-02-15

## 2019-02-15 RX ORDER — AZTREONAM 2 G
2000 VIAL (EA) INJECTION EVERY 8 HOURS
Qty: 0 | Refills: 0 | Status: DISCONTINUED | OUTPATIENT
Start: 2019-02-15 | End: 2019-02-15

## 2019-02-15 RX ORDER — VANCOMYCIN HCL 1 G
1000 VIAL (EA) INTRAVENOUS ONCE
Qty: 0 | Refills: 0 | Status: DISCONTINUED | OUTPATIENT
Start: 2019-02-15 | End: 2019-02-15

## 2019-02-15 RX ORDER — PIPERACILLIN AND TAZOBACTAM 4; .5 G/20ML; G/20ML
3.38 INJECTION, POWDER, LYOPHILIZED, FOR SOLUTION INTRAVENOUS EVERY 8 HOURS
Qty: 0 | Refills: 0 | Status: DISCONTINUED | OUTPATIENT
Start: 2019-02-15 | End: 2019-02-19

## 2019-02-15 RX ORDER — ACETAMINOPHEN 500 MG
650 TABLET ORAL EVERY 6 HOURS
Qty: 0 | Refills: 0 | Status: DISCONTINUED | OUTPATIENT
Start: 2019-02-15 | End: 2019-02-16

## 2019-02-15 RX ADMIN — SODIUM CHLORIDE 100 MILLILITER(S): 9 INJECTION INTRAMUSCULAR; INTRAVENOUS; SUBCUTANEOUS at 06:00

## 2019-02-15 RX ADMIN — Medication 1 APPLICATION(S): at 17:28

## 2019-02-15 RX ADMIN — Medication 400 MILLIGRAM(S): at 01:49

## 2019-02-15 RX ADMIN — Medication 1000 MILLIGRAM(S): at 02:30

## 2019-02-15 RX ADMIN — ENOXAPARIN SODIUM 40 MILLIGRAM(S): 100 INJECTION SUBCUTANEOUS at 08:33

## 2019-02-15 RX ADMIN — MIDODRINE HYDROCHLORIDE 10 MILLIGRAM(S): 2.5 TABLET ORAL at 21:37

## 2019-02-15 RX ADMIN — Medication 2.5 MILLIGRAM(S): at 08:33

## 2019-02-15 RX ADMIN — Medication 125 MILLILITER(S): at 08:42

## 2019-02-15 RX ADMIN — MIDODRINE HYDROCHLORIDE 10 MILLIGRAM(S): 2.5 TABLET ORAL at 13:24

## 2019-02-15 RX ADMIN — OXYCODONE HYDROCHLORIDE 5 MILLIGRAM(S): 5 TABLET ORAL at 19:24

## 2019-02-15 RX ADMIN — SODIUM CHLORIDE 1000 MILLILITER(S): 9 INJECTION INTRAMUSCULAR; INTRAVENOUS; SUBCUTANEOUS at 06:56

## 2019-02-15 RX ADMIN — Medication 250 MILLIGRAM(S): at 00:51

## 2019-02-15 RX ADMIN — MIDODRINE HYDROCHLORIDE 10 MILLIGRAM(S): 2.5 TABLET ORAL at 08:35

## 2019-02-15 RX ADMIN — PIPERACILLIN AND TAZOBACTAM 25 GRAM(S): 4; .5 INJECTION, POWDER, LYOPHILIZED, FOR SOLUTION INTRAVENOUS at 11:48

## 2019-02-15 RX ADMIN — Medication 1 APPLICATION(S): at 08:33

## 2019-02-15 RX ADMIN — Medication 100 MILLIGRAM(S): at 06:56

## 2019-02-15 RX ADMIN — FAMOTIDINE 20 MILLIGRAM(S): 10 INJECTION INTRAVENOUS at 11:48

## 2019-02-15 RX ADMIN — SODIUM CHLORIDE 2000 MILLILITER(S): 9 INJECTION INTRAMUSCULAR; INTRAVENOUS; SUBCUTANEOUS at 04:28

## 2019-02-15 RX ADMIN — SODIUM CHLORIDE 2000 MILLILITER(S): 9 INJECTION INTRAMUSCULAR; INTRAVENOUS; SUBCUTANEOUS at 06:02

## 2019-02-15 RX ADMIN — Medication 400 MILLIGRAM(S): at 00:48

## 2019-02-15 RX ADMIN — Medication 15 MILLIGRAM(S): at 00:49

## 2019-02-15 RX ADMIN — Medication 50 GRAM(S): at 10:34

## 2019-02-15 RX ADMIN — Medication 2.5 MILLIGRAM(S): at 17:27

## 2019-02-15 RX ADMIN — PIPERACILLIN AND TAZOBACTAM 25 GRAM(S): 4; .5 INJECTION, POWDER, LYOPHILIZED, FOR SOLUTION INTRAVENOUS at 21:37

## 2019-02-15 RX ADMIN — OXYCODONE HYDROCHLORIDE 5 MILLIGRAM(S): 5 TABLET ORAL at 20:21

## 2019-02-15 NOTE — H&P ADULT - PROBLEM SELECTOR PLAN 2
-leukocytosis, fever, tachycardia  -u/a negative, negative RVP  -port could be potential source of infection; obtain blood cultures from port  -f/u blood cultures  -empiric antibiotic coverage as above

## 2019-02-15 NOTE — H&P ADULT - ASSESSMENT
68 y.o. 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

## 2019-02-15 NOTE — H&P ADULT - PROBLEM SELECTOR PLAN 3
-evaluated by dermatology who believes it to be a reaction to CTX vs iodine contrast media, now with new involvement of her face   -no evidence of mucosal or airway involvement, no evidence of angioedema  -c/w benadryl and zyrtec  -will initiate pepcid  -continue -evaluated by dermatology who believes it to be a reaction to CTX vs iodine contrast media, now with new involvement of her face   -no evidence of mucosal or airway involvement, no evidence of angioedema  -c/w benadryl and zyrtec  -will initiate pepcid  -will hold off on steroids for now given possible infection  -avoid B lactams   -continue triamcinolone cream -evaluated by dermatology who believes it to be a reaction to CTX vs iodine contrast media, now with new involvement of her face   -no evidence of mucosal or airway involvement, no evidence of angioedema  -c/w benadryl and zyrtec  -will initiate pepcid  -will hold off on steroids for now given possible infection  -avoid B lactams  -if becomes hypotensive, would give epinephrine    -continue triamcinolone cream -evaluated by dermatology who believes it to be a reaction to CTX vs iodine contrast media, now with new involvement of her face   -no evidence of mucosal or airway involvement, no evidence of angioedema  -c/w benadryl and zyrtec  -will initiate pepcid  -will hold off on steroids for now given possible infection  -avoid B lactams  -if becomes hypotensive, would consider epinephrine    -continue triamcinolone cream --now developing hypotension after receiving 3L IVF, suspect from septic shock. Differential includes anaphylaxis given possible drug reactions to cephalosporins, possibly beta-lactam antibiotics. Has rash but has not worsened since admission, and no other signs of anaphylaxis.   --MICU consulted for vasopressor support.

## 2019-02-15 NOTE — H&P ADULT - NSHPPHYSICALEXAM_GEN_ALL_CORE
General: somnolent, ill appearing  Neurology: A&Ox3, nonfocal, EDWARDS x 4  Eyes: PERRLA/ EOMI, Gross vision intact  ENT/Neck: Neck supple, trachea midline, No JVD, Gross hearing intact  Respiratory: CTA B/L, No wheezing, rales, rhonchi  CV: RRR, S1S2, no murmurs, rubs or gallops. Port with some surrounding warmth and erythema  Abdominal: Soft, protuberant, tender to palpation diffusely    Extremities: No edema, + peripheral pulses  Skin: erythematous papules noted on neck, back, and UE. Erythema also noted on forehead, cheeks, and ears. Vital Signs Last 24 Hrs  T(C): 36.9 (02-15-19 @ 05:12)  T(F): 98.4 (02-15-19 @ 05:12), Max: 103 (02-14-19 @ 21:20)  HR: 90 (02-15-19 @ 05:12) (90 - 143)  BP: 88/57 (02-15-19 @ 05:12)  BP(mean): 112 (02-14-19 @ 22:03) (112 - 112)  RR: 16 (02-15-19 @ 05:12) (16 - 20)  SpO2: 97% (02-15-19 @ 05:12) (94% - 98%)  Wt(kg): --    General: somnolent, ill appearing  Neurology: A&Ox3, nonfocal, EDWARDS x 4  Eyes: PERRLA/ EOMI, Gross vision intact  ENT/Neck: Neck supple, trachea midline, No JVD, Gross hearing intact  Respiratory: CTA B/L, No wheezing, rales, rhonchi  CV: RRR, S1S2, no murmurs, rubs or gallops. Port with some surrounding warmth and erythema  Abdominal: Soft, protuberant, tender to palpation diffusely    Extremities: No edema, + peripheral pulses  Skin: erythematous papules noted on neck, back, and UE. Erythema also noted on forehead, cheeks, and ears.

## 2019-02-15 NOTE — PROGRESS NOTE ADULT - ATTENDING COMMENTS
agree w/ following additions:.  seen and examined multiple times this AM, 69 yo woman w/ metastatic malignancy undergoing chemo w/ Dr. Scruggs w/ chronic leukocytosis who p/w septic shock; febrile 103 tachycardic 140s hypotensive to 80s requiring 4L fluids + albumin and now on oral midodrine on floor, micu consulted for pressor support, rejected when pressures rebounded. Received Vanc/zosyn, then aztreonam. On exam mentating well, making urine, WWP, eating well w/ good appetite, ++tender abdomen. We broadened back to zosyn (tolerated well last night, no hx of esbl and hyponatremia seems relatively acute: want to avoid seizure w/ penem). CT ab/p done this am: moderate ascites, will tap, likely malignant ascites. Recent pressures in 100s systolic, continuing to mentate well, laughing at jokes. Lactate flat. Blood cultures w/ GNR, await identification. As above no hx of esbl and now maintaining pressures nicely w/ resolved fever and tachycardia, being covered broadly including pseudomonas with re-assuring physical. Repeat CBC/BMP/Lactate at 5-6PM tonight, suspect leukocytosis 29 this AM is reactive to HD collapse last night. If she declines will require penem + likely escalation to MICU. Currently HD stable on appropriate coverage. Will continue to monitor closely and adjust course as needed. D/w Dr. Scruggs, oncologist, who's team will evaluate today.

## 2019-02-15 NOTE — CONSULT NOTE ADULT - SUBJECTIVE AND OBJECTIVE BOX
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 Patient first began to feel unwell yesterday; her  took her temperature and she was found to be febrile to Tmax 102F.  He gave her tylenol and oxycodone, and her temperature subsided, and upon speaking with [facility], he was advised to watch her and return to ED if she spiked another temperature. Later in the evening, patient began to have makred rigors with increased temperature, and he called the EMS to bring her in.     During last admission, patient with low grade fevers without etiology of infectious source. Patient recently had port placed on , at which time she was given ceftriaxone. She developed a rash shortly after  on her chest, back, and arms ; dermatology saw pt and attributed it to CTX vs recently administered contrast media.  She was prescribed Benadryl and Zyrtec.     Today, patient reports abdominal pain which she states has been ongoing for several months now. ROS otherwise negative for CP, HA, dizziness, dysphagia, N/V/D, dysuria, or hematuria. Patient provides minimal history due to lethargy. Her  has not noticed any difficulty breathing. Rash has worsened at home, but hasn't seem to have worsened while here in the hospital.     In the ED, patient was found to be febrile to Tmax 103F as well as tachycardic. She received NS x 2 L as well as vanco/zosynx 1 dose. MICU now consulted for hypotension.      PAST MEDICAL & SURGICAL HISTORY:  Obesity: BMI:  34.7  Grief at loss of child: Intermittent, s/p loss of child &#x27;   Hypertension: Borderline. No meds  S/P tooth extraction: &#x27;       FAMILY HISTORY:  No pertinent family history in first degree relatives      SOCIAL HISTORY:  Smoking: [ X] Never Smoked [ ] Former Smoker (__ packs x ___ years) [ ] Current Smoker  (__ packs x ___ years)  Substance Use: [X ] Never Used [ ] Used ____  EtOH Use:X  Marital Status: [ ] Single [X ]  [ ]  [ ]     Allergies    aspirin (Stomach Upset)  ceftriaxone (Rash)    Intolerances        HOME MEDICATIONS:    REVIEW OF SYSTEMS:  Constitutional: [ ] negative [ X] fevers [ ] chills [ ] weight loss [ ] weight gain  HEENT: [ X] negative [ ] dry eyes [ ] eye irritation [ ] postnasal drip [ ] nasal congestion  CV: [X ] negative  [ ] chest pain [ ] orthopnea [ ] palpitations [ ] murmur  Resp: [X ] negative [ ] cough [ ] shortness of breath [ ] dyspnea [ ] wheezing [ ] sputum [ ] hemoptysis  GI: [ ] negative [ ] nausea [ ] vomiting [ ] diarrhea [ ] constipation [X ] abd pain [ ] dysphagia   : [X ] negative [ ] dysuria [ ] nocturia [ ] hematuria [ ] increased urinary frequency  Musculoskeletal: [ X] negative [ ] back pain [ ] myalgias [ ] arthralgias [ ] fracture  Skin: [X ] negative [ ] rash [ ] itch  Neurological: [ ] negative [ ] headache [ ] dizziness [ ] syncope [ ] weakness [ ] numbness  Psychiatric: [ ] negative [ ] anxiety [ ] depression  Endocrine: [ ] negative [ ] diabetes [ ] thyroid problem  Hematologic/Lymphatic: [ ] negative [ ] anemia [ ] bleeding problem  Allergic/Immunologic: [ ] negative [ ] itchy eyes [ ] nasal discharge [ ] hives [ ] angioedema  [ ] All other systems negative  [ ] Unable to assess ROS because ________    OBJECTIVE:  ICU Vital Signs Last 24 Hrs  T(C): 36.9 (15 Feb 2019 05:12), Max: 39.4 (2019 21:20)  T(F): 98.4 (15 Feb 2019 05:12), Max: 103 (2019 21:20)  HR: 90 (15 Feb 2019 05:12) (90 - 143)  BP: 88/57 (15 Feb 2019 05:12) (88/57 - 146/99)  BP(mean): 112 (2019 22:03) (112 - 112)  ABP: --  ABP(mean): --  RR: 16 (15 Feb 2019 05:12) (16 - 20)  SpO2: 97% (15 Feb 2019 05:12) (94% - 98%)        CAPILLARY BLOOD GLUCOSE          PHYSICAL EXAM:  General: somnolent, ill appearing, lethargic, answering questions appropriately   Neurology: A&Ox3, nonfocal, EDWARDS x 4  Eyes: PERRLA/ EOMI,  ENT/Neck: Neck supple, trachea midline, No JVD, Gross hearing intact  Respiratory: CTA B/L, No wheezing, rales, rhonchi  CV: RRR, S1S2, no murmurs, rubs or gallops. Port with some surrounding warmth and erythema  Abdominal: Soft, distended, tender to palpation diffusely , +BS  Extremities: No edema, + peripheral pulses  Skin: erythematous papules noted on neck, back, and UE. Erythema also noted on forehead, cheeks, and ears.    LINES:     HOSPITAL MEDICATIONS:  enoxaparin Injectable 40 milliGRAM(s) SubCutaneous every 24 hours    aztreonam  IVPB 2000 milliGRAM(s) IV Intermittent once  aztreonam  IVPB 2000 milliGRAM(s) IV Intermittent every 8 hours  aztreonam  IVPB      vancomycin  IVPB 1000 milliGRAM(s) IV Intermittent every 12 hours        cetirizine 10 milliGRAM(s) Oral daily    acetaminophen   Tablet .. 650 milliGRAM(s) Oral every 6 hours PRN  diphenhydrAMINE 25 milliGRAM(s) Oral every 6 hours PRN  dronabinol 2.5 milliGRAM(s) Oral two times a day  oxyCODONE    IR 5 milliGRAM(s) Oral four times a day PRN    famotidine    Tablet 20 milliGRAM(s) Oral daily        sodium chloride 0.9% Bolus 500 milliLiter(s) IV Bolus once  sodium chloride 0.9%. 1000 milliLiter(s) IV Continuous <Continuous>      triamcinolone 0.1% Ointment 1 Application(s) Topical two times a day        LABS:                        12.4   18.5  )-----------( 353      ( 2019 22:06 )             36.9     Hgb Trend: 12.4<--, 12.6<--  02-14    127<L>  |  92<L>  |  8   ----------------------------<  105<H>  4.5   |  22  |  0.48<L>    Ca    9.3      2019 22:06    TPro  7.5  /  Alb  3.5  /  TBili  1.0  /  DBili  x   /  AST  62<H>  /  ALT  16  /  AlkPhos  164<H>      Creatinine Trend: 0.48<--, 0.47<--    Urinalysis Basic - ( 15 Feb 2019 00:17 )    Color: Yellow / Appearance: Clear / S.018 / pH: x  Gluc: x / Ketone: Negative  / Bili: Negative / Urobili: Negative   Blood: x / Protein: 30 mg/dL / Nitrite: Negative   Leuk Esterase: Negative / RBC: 3 /hpf / WBC 4 /HPF   Sq Epi: x / Non Sq Epi: 3 /hpf / Bacteria: Negative        Venous Blood Gas:   @ 22:06  7.42/42/21//32  VBG Lactate: 1.8

## 2019-02-15 NOTE — CONSULT NOTE ADULT - SUBJECTIVE AND OBJECTIVE BOX
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 History obtained from  at bedside. Pt was at store yesterday and noted lower grade temperatures. Call made to [facility] and was told to monitor and try tylenol. Fevers persisted with temperature up to 102F. Therefore, EMS was called. Prior to this, pt felt well. Has had abdominal pain that has been chronic since diagnosis. Pain is unchanged from baseline. Pt was recently diagnosed unknown primary carcinoma, suspected pancreaticobiliary given elevated CA 19-9. Was planned on FOLFOX with repeat imaging. She underwent cycle 1 this past Monday. Subsequently developed diffuse rash on her chest, back, and arms seen by derm and prescribed Benadryl and Zyrtec. No diarrhea, dysuria, headache, swelling, cough, dyspnea.       PAST MEDICAL & SURGICAL HISTORY:  Obesity: BMI:  34.7  Grief at loss of child: Intermittent, s/p loss of child &#x27; 2013  Hypertension: Borderline. No meds  S/P tooth extraction: &#x27; 2011      Allergies    aspirin (Stomach Upset)  ceftriaxone (Rash)    Intolerances        MEDICATIONS  (STANDING):  dronabinol 2.5 milliGRAM(s) Oral two times a day  famotidine    Tablet 20 milliGRAM(s) Oral daily  piperacillin/tazobactam IVPB. 3.375 Gram(s) IV Intermittent every 8 hours  sodium chloride 0.9%. 1000 milliLiter(s) (100 mL/Hr) IV Continuous <Continuous>  triamcinolone 0.1% Ointment 1 Application(s) Topical two times a day  vancomycin  IVPB 1000 milliGRAM(s) IV Intermittent every 24 hours    MEDICATIONS  (PRN):  acetaminophen   Tablet .. 650 milliGRAM(s) Oral every 6 hours PRN Temp greater or equal to 38C (100.4F), Mild Pain (1 - 3)  diphenhydrAMINE 25 milliGRAM(s) Oral every 6 hours PRN Rash and/or Itching  oxyCODONE    IR 5 milliGRAM(s) Oral four times a day PRN Severe Pain (7 - 10)      FAMILY HISTORY:  Aunt with gastric cancer      SOCIAL HISTORY: No EtOH, no tobacco; originally from [place]     Height (cm): 149.86 (02-15 @ 05:12)  Weight (kg): 70.7 (02-15 @ 05:12)  BMI (kg/m2): 31.5 (02-15 @ 05:12)  BSA (m2): 1.66 (02-15 @ 05:12)    VITALS:   T(F): 98 (02-15-19 @ 11:34), Max: 103 (02-14-19 @ 21:20)  HR: 85 (02-15-19 @ 11:34)  BP: 102/62 (02-15-19 @ 11:34)  RR: 16 (02-15-19 @ 11:34)  SpO2: 98% (02-15-19 @ 11:34)  Wt(kg): --    PHYSICAL EXAM    GENERAL: NAD, appears weak and sleepy   HEAD:  Atraumatic, Normocephalic  EYES: EOMI, PERRLA, conjunctiva and sclera clear  NECK: Supple, No JVD  CHEST/LUNG: Clear to auscultation bilaterally  HEART: Regular rate and rhythm; No murmurs, rubs, or gallops  ABDOMEN: Soft, Nontender, Nondistended; no organomegaly   EXTREMITIES:  No clubbing, cyanosis, or edema  SKIN: erythematous rash noted bilaterally mostly in neck area     LABS:                         10.0   29.20 )-----------( 304      ( 15 Feb 2019 10:39 )             32.2     02-15    128<L>  |  101  |  11  ----------------------------<  100<H>  3.8   |  15<L>  |  0.89    Ca    7.4<L>      15 Feb 2019 08:34  Phos  2.7     02-15  Mg     1.4     02-15    TPro  6.1  /  Alb  2.6<L>  /  TBili  0.7  /  DBili  x   /  AST  51<H>  /  ALT  13  /  AlkPhos  116  02-15    Magnesium, Serum: 1.4 mg/dL (02-15 @ 08:34)  Phosphorus Level, Serum: 2.7 mg/dL (02-15 @ 08:34)      .Blood Blood  02-15 @ 01:03   Growth in anaerobic bottle: Gram Negative Rods  "Due to technical problems, Proteus sp. will Not be reported as part of  the BCID panel until further notice"  ***Blood Panel PCR results on this specimen are available  approximately 3 hours after the Gram stain result.***  Gram stain, PCR, and/or culture results may not always  correspond due to difference in methodologies.  ************************************************************  This PCR assay was performed using [employer].  The following targets are tested for: Enterococcus,  vancomycin resistant enterococci, Listeria monocytogenes,  coagulase negative staphylococci, S. aureus,  methicillin resistant S. aureus, Streptococcus agalactiae  (Group B), S. pneumoniae, S. pyogenes (Group A),  Acinetobacter baumannii, Enterobacter cloacae, E. coli,  Klebsiella oxytoca, K. pneumoniae, Proteus sp.,  Serratia marcescens, Haemophilus influenzae,  Neisseria meningitidis, Pseudomonas aeruginosa, Candida  albicans, C. glabrata, C krusei, C parapsilosis,  C. tropicalis and the KPC resistance gene.  --    Growth in anaerobic bottle: Gram Negative Rods      BLOOD PERIPHERAL  01-15 @ 12:20 --    NO ORGANISMS ISOLATED  --      BLOOD  01-15 @ 11:15 --    NO ORGANISMS ISOLATED  --          IMAGING:

## 2019-02-15 NOTE — H&P ADULT - NSHPSOCIALHISTORY_GEN_ALL_CORE
Patient lives at home with . She has never been a smoker. She does not drink alcohol or use illicit drugs.

## 2019-02-15 NOTE — H&P ADULT - PROBLEM SELECTOR PLAN 5
-DVT ppx: Lovenox  -Diet: regular --Na 127 on presentation (baseline around 134)  --suspect secondary to hypovolemia in sepsis, but has now received 3L IVF without other urine studies, osms.   --check stat repeat BMP now (AM labs); check urine studies. Doubt this is this cause of her lethargy.

## 2019-02-15 NOTE — H&P ADULT - NSHPREVIEWOFSYSTEMS_GEN_ALL_CORE
REVIEW OF SYSTEMS:    CONSTITUTIONAL: + fpr fevers  EYES/ENT: No visual changes;  No vertigo or throat pain   NECK: No pain or stiffness  RESPIRATORY: No cough, wheezing, hemoptysis; No shortness of breath  CARDIOVASCULAR: No chest pain or palpitations  GASTROINTESTINAL: + fpr abdominal pain.  No epigastric pain. No nausea, vomiting, or hematemesis; No diarrhea or constipation.   GENITOURINARY: No dysuria, frequency or hematuria  NEUROLOGICAL: No numbness or weakness  SKIN: No itching, rashes Unable to complete ROS due to patient lethargy, poor response to questions.

## 2019-02-15 NOTE — H&P ADULT - NSHPLABSRESULTS_GEN_ALL_CORE
.                        12.4   18.5  )-----------( 353      ( 2019 22:06 )             36.9     Hgb Trend: 12.4<--, 12.6<--  02-14    127<L>  |  92<L>  |  8   ----------------------------<  105<H>  4.5   |  22  |  0.48<L>    Ca    9.3      2019 22:06    TPro  7.5  /  Alb  3.5  /  TBili  1.0  /  DBili  x   /  AST  62<H>  /  ALT  16  /  AlkPhos  164<H>  02-14    Creatinine Trend: 0.48<--, 0.47<--, 0.43<--    Urinalysis Basic - ( 15 Feb 2019 00:17 )    Color: Yellow / Appearance: Clear / S.018 / pH: x  Gluc: x / Ketone: Negative  / Bili: Negative / Urobili: Negative   Blood: x / Protein: 30 mg/dL / Nitrite: Negative   Leuk Esterase: Negative / RBC: 3 /hpf / WBC 4 /HPF   Sq Epi: x / Non Sq Epi: 3 /hpf / Bacteria: Negative 12.4   18.5  )-----------( 353      ( 2019 22:06 )             36.9     Hgb Trend: 12.4<--, 12.6<--  02-14    127<L>  |  92<L>  |  8   ----------------------------<  105<H>  4.5   |  22  |  0.48<L>    Ca    9.3      2019 22:06    TPro  7.5  /  Alb  3.5  /  TBili  1.0  /  DBili  x   /  AST  62<H>  /  ALT  16  /  AlkPhos  164<H>  02-14    Creatinine Trend: 0.48<--, 0.47<--, 0.43<--    Urinalysis Basic - ( 15 Feb 2019 00:17 )    Color: Yellow / Appearance: Clear / S.018 / pH: x  Gluc: x / Ketone: Negative  / Bili: Negative / Urobili: Negative   Blood: x / Protein: 30 mg/dL / Nitrite: Negative   Leuk Esterase: Negative / RBC: 3 /hpf / WBC 4 /HPF   Sq Epi: x / Non Sq Epi: 3 /hpf / Bacteria: Negative Labs and imaging personally reviewed and interpreted.     Labs:              12.4   18.5  )-----------( 353      ( 2019 22:06 )             36.9     Hgb Trend: 12.4<--, 12.6<--  02-14    127<L>  |  92<L>  |  8   ----------------------------<  105<H>  4.5   |  22  |  0.48<L>    Ca    9.3      2019 22:06    TPro  7.5  /  Alb  3.5  /  TBili  1.0  /  DBili  x   /  AST  62<H>  /  ALT  16  /  AlkPhos  164<H>      Creatinine Trend: 0.48<--, 0.47<--, 0.43<--    Urinalysis Basic - ( 15 Feb 2019 00:17 )    Color: Yellow / Appearance: Clear / S.018 / pH: x  Gluc: x / Ketone: Negative  / Bili: Negative / Urobili: Negative   Blood: x / Protein: 30 mg/dL / Nitrite: Negative   Leuk Esterase: Negative / RBC: 3 /hpf / WBC 4 /HPF   Sq Epi: x / Non Sq Epi: 3 /hpf / Bacteria: Negative    Imaging:  EXAM:  XR CHEST PORTABLE URGENT 1V                        PROCEDURE DATE:  2019    ******PRELIMINARY REPORT******        INTERPRETATION:  No focal consolidation.    Care discussed with other providers: Kev, ANDRE Mahan  Consult notes reviewed: Yes, ANDRE

## 2019-02-15 NOTE — CONSULT NOTE ADULT - ASSESSMENT
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    1. Septic Shock   - unclear etiology- presented with fevers and tachycardia and elevated WBC count   - follow up blood cultures and urine cultures; RVP negative  - recommend CT A/P to evaluate for typhlitis and other intra-abdominal pathology   - s/p 3L of fluid resucuitation;  recommend albumin 5% 250 cc and 10mg of midodrine  - if patient continues to remain hypotensive, please call MICU for re-evaluation for possible pressor support     2. Hyponatremia  - likely hypovolemic hyponatremia  - can check urine studies ( may be skewed as patient is fluid resuscitated     3. Drug rash?  - per derm note, possible CTX rash vs contrast reaction  - c/w cetrizine and triamcinolone recommend to hold benadryl if not need as patient lethargic

## 2019-02-15 NOTE — PROGRESS NOTE ADULT - PROBLEM SELECTOR PLAN 1
Pt w/ HR>90, leukocytosis of 19. Found to have to have multiple hepatic lesions, largest 11.1 cm. DDx includes infectious and neoplastic etiology.  - will c/w zosyn  - ID following, will f/u recs  - will c/s IR, will determine whether mass is drainable  - f/u Bcxs - septic on admission, now improved, HD stable though on oral midodrine.  - will c/w zosyn  - will c/s IR, re: tap of abdominal ascites.   - f/u Bcxs

## 2019-02-15 NOTE — PROGRESS NOTE ADULT - ASSESSMENT
67 yo F with no known medical history presenting w/ liver mass noted on outpatient CT in the setting of intermittent dull, diffuse abdominal pain x1 month and recent travel to [place]. PT s/p IR biopsy 1/17, results pending. 69 yo F with metastatic CA of unknown primary, getting chemo w/ Dr. Scruggs as outpatient, who presents in septic shock, now w/ pressures maintaining after aggressive fluid resuscitation on oral midodrine and blood cultures growing gram negative rods. Most likely source is abdomen, CT ab/p w/ out abscess, will tap ascites present. 69 yo F with metastatic CA of unknown primary, getting chemo w/ Dr. Scruggs as outpatient, who presents in septic shock, now w/ pressures maintaining after aggressive fluid resuscitation on oral midodrine and blood cultures growing gram negative rods. Most likely source is abdomen, CT ab/p w/ out abscess and U/S showing minimal ascites. Will continue to monitor on IV abx and follow workup.

## 2019-02-15 NOTE — H&P ADULT - PROBLEM SELECTOR PLAN 1
Spoke with mom, scheduled a follow up appt for 3/9/17.  Send a reminder in the mail.  Mom understood   -infectious etiology vs tumor fevers  -Given recent chemotherapy, will treat empirically with antibiotics until cultures result   -will continue vanco/aztreonam due to concern for allergic reaction to B lactams  -Tylenol PRN for fevers

## 2019-02-15 NOTE — H&P ADULT - HISTORY OF PRESENT ILLNESS
68 y.o. 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 Patient first began to feel unwell yesterday; her  took her temperature and she was found to be febrile to Tmax 102F.  Per 's report, patient had recent placement of chemotherapy port on 2/7; she received ppx CTX after the procedure. Patient subsequently developed a rash on her chest, back, and arms ; dermatology saw pt and attributed it to CTX vs recently administered contrast media.  She was prescribed Benadryl and Zyrtec. ROS + for abdominal pain, which patient states has been ongoing for several months now. ROS otherwise negative for CP, HA, dizziness, dysphagia, N/V/D, dysuria, or hematuria.     In the ED, patient was found to be febrile to Tmax 103F as well as tachycardic. She received NS x 2 L as well as vanco/cefepime x 1 dose. 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 Patient first began to feel unwell yesterday; her  took her temperature and she was found to be febrile to Tmax 102F.  Per 's report, patient had recent placement of chemotherapy port on 2/7; she received ppx CTX after the procedure. Patient subsequently developed a rash on her chest, back, and arms ; dermatology saw pt and attributed it to CTX vs recently administered contrast media.  She was prescribed Benadryl and Zyrtec. ROS + for abdominal pain, which patient states has been ongoing for several months now. ROS otherwise negative for CP, HA, dizziness, dysphagia, N/V/D, dysuria, or hematuria.     In the ED, patient was found to be febrile to Tmax 103F as well as tachycardic. She received NS x 2 L as well as vanco/cefepime x 1 dose. 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 Patient first began to feel unwell yesterday; her  took her temperature and she was found to be febrile to Tmax 102F.  Per 's report, patient had recent placement of chemotherapy port on 2/7; she received ppx CTX after the procedure. Patient subsequently developed a rash on her chest, back, and arms ; dermatology saw pt and attributed it to CTX vs recently administered contrast media.  She was prescribed Benadryl and Zyrtec. ROS + for abdominal pain, which patient states has been ongoing for several months now. ROS otherwise negative for CP, HA, dizziness, dysphagia, N/V/D, dysuria, or hematuria. Patient provides minimal history due to lethargy. Her  has not noticed any difficulty breathing. Rash has worsened at home, but hasn't seem to have worsened while here in the hospital.     In the ED, patient was found to be febrile to Tmax 103F as well as tachycardic. She received NS x 2 L as well as vanco/cefepime x 1 dose.

## 2019-02-15 NOTE — PROGRESS NOTE ADULT - PROBLEM SELECTOR PLAN 2
Pt w/ multiple hepatic abscesses visualized on outpatient CT A/P. Concern for infection/abscess vs malignancy.   - c/w zosyn  - will f/u AFP, CEA, CA 19, TB, hepatitis panel  - f/u IR re drainage  - will need bacterial, fungal and AFB cultures if abscess - GNR bacteremia, on zosyn. Continue and narrow as possible.   - source likely GI, f/u urinary studies.   - repeat cultures pending now.

## 2019-02-15 NOTE — PROGRESS NOTE ADULT - SUBJECTIVE AND OBJECTIVE BOX
Dr. Jaime Jacques  Internal Medicine PGY-1   Pager# [phone]    Patient is a 68y old  Female who presents with a chief complaint of Fever (15 Feb 2019 06:52)      SUBJECTIVE / OVERNIGHT EVENTS:     MEDICATIONS  (STANDING):  aztreonam  IVPB 2000 milliGRAM(s) IV Intermittent every 8 hours  aztreonam  IVPB      cetirizine 10 milliGRAM(s) Oral daily  dronabinol 2.5 milliGRAM(s) Oral two times a day  enoxaparin Injectable 40 milliGRAM(s) SubCutaneous every 24 hours  famotidine    Tablet 20 milliGRAM(s) Oral daily  sodium chloride 0.9%. 1000 milliLiter(s) (100 mL/Hr) IV Continuous <Continuous>  triamcinolone 0.1% Ointment 1 Application(s) Topical two times a day  vancomycin  IVPB 1000 milliGRAM(s) IV Intermittent every 12 hours    MEDICATIONS  (PRN):  acetaminophen   Tablet .. 650 milliGRAM(s) Oral every 6 hours PRN Temp greater or equal to 38C (100.4F), Mild Pain (1 - 3)  diphenhydrAMINE 25 milliGRAM(s) Oral every 6 hours PRN Rash and/or Itching  oxyCODONE    IR 5 milliGRAM(s) Oral four times a day PRN Severe Pain (7 - 10)      Vital Signs Last 24 Hrs  T(C): 36.9 (15 Feb 2019 05:12), Max: 39.4 (14 Feb 2019 21:20)  T(F): 98.4 (15 Feb 2019 05:12), Max: 103 (14 Feb 2019 21:20)  HR: 94 (15 Feb 2019 07:00) (90 - 143)  BP: 92/53 (15 Feb 2019 07:00) (88/57 - 146/99)  BP(mean): 112 (14 Feb 2019 22:03) (112 - 112)  RR: 16 (15 Feb 2019 05:12) (16 - 20)  SpO2: 97% (15 Feb 2019 05:12) (94% - 98%)  CAPILLARY BLOOD GLUCOSE        I&O's Summary      PHYSICAL EXAM:  GENERAL: NAD, well-developed  HEAD:  Atraumatic, Normocephalic  EYES: EOMI, PERRLA, conjunctiva and sclera clear  NECK: Supple, No JVD  CHEST/LUNG: Clear to auscultation bilaterally; No wheeze  HEART: Regular rate and rhythm; No murmurs, rubs, or gallops  ABDOMEN: Soft, Nontender, Nondistended; Bowel sounds present  EXTREMITIES:  2+ Peripheral Pulses, No clubbing, cyanosis, or edema  PSYCH: AAOx3  NEUROLOGY: non-focal  SKIN: No rashes or lesions Dr. Jaime Jacques  Internal Medicine PGY-1   Pager# [phone]    Patient is a 68y old  Female who presents with a chief complaint of Fever (15 Feb 2019 06:52)    SUBJECTIVE / OVERNIGHT EVENTS: septic shock, admitted to floor.     MEDICATIONS  (STANDING):  aztreonam  IVPB 2000 milliGRAM(s) IV Intermittent every 8 hours  aztreonam  IVPB      cetirizine 10 milliGRAM(s) Oral daily  dronabinol 2.5 milliGRAM(s) Oral two times a day  enoxaparin Injectable 40 milliGRAM(s) SubCutaneous every 24 hours  famotidine    Tablet 20 milliGRAM(s) Oral daily  sodium chloride 0.9%. 1000 milliLiter(s) (100 mL/Hr) IV Continuous <Continuous>  triamcinolone 0.1% Ointment 1 Application(s) Topical two times a day  vancomycin  IVPB 1000 milliGRAM(s) IV Intermittent every 12 hours    MEDICATIONS  (PRN):  acetaminophen   Tablet .. 650 milliGRAM(s) Oral every 6 hours PRN Temp greater or equal to 38C (100.4F), Mild Pain (1 - 3)  diphenhydrAMINE 25 milliGRAM(s) Oral every 6 hours PRN Rash and/or Itching  oxyCODONE    IR 5 milliGRAM(s) Oral four times a day PRN Severe Pain (7 - 10)      Vital Signs Last 24 Hrs  T(C): 36.9 (15 Feb 2019 05:12), Max: 39.4 (14 Feb 2019 21:20)  T(F): 98.4 (15 Feb 2019 05:12), Max: 103 (14 Feb 2019 21:20)  HR: 94 (15 Feb 2019 07:00) (90 - 143)  BP: 92/53 (15 Feb 2019 07:00) (88/57 - 146/99)  BP(mean): 112 (14 Feb 2019 22:03) (112 - 112)  RR: 16 (15 Feb 2019 05:12) (16 - 20)  SpO2: 97% (15 Feb 2019 05:12) (94% - 98%)  CAPILLARY BLOOD GLUCOSE    I&O's Summary    PHYSICAL EXAM:  GENERAL: NAD, well-developed. Mentating well.   HEAD:  Atraumatic, Normocephalic  EYES: EOMI, PERRLA, conjunctiva and sclera clear  NECK: Supple, No JVD  CHEST/LUNG: Clear to auscultation bilaterally; No wheeze  HEART: Regular rate and rhythm; 2/6 systolic murmur over LUSB.   ABDOMEN: Soft, +tender to palpation, Nondistended; Bowel sounds present  EXTREMITIES:  2+ Peripheral Pulses, No clubbing, cyanosis, or edema. WWP.  PSYCH: AAOx3  NEUROLOGY: non-focal  SKIN: No rashes or lesions Dr. Jaime Jacques  Internal Medicine PGY-1   Pager# [phone]    Patient is a 68y old  Female who presents with a chief complaint of Fever (15 Feb 2019 06:52)    SUBJECTIVE / OVERNIGHT EVENTS: septic shock, admitted to floor. At bedside main complaints are feeling tired and abdominal pain. Denies SOB, chest pain, nausea, vomiting.     MEDICATIONS  (STANDING):  aztreonam  IVPB 2000 milliGRAM(s) IV Intermittent every 8 hours  aztreonam  IVPB      cetirizine 10 milliGRAM(s) Oral daily  dronabinol 2.5 milliGRAM(s) Oral two times a day  enoxaparin Injectable 40 milliGRAM(s) SubCutaneous every 24 hours  famotidine    Tablet 20 milliGRAM(s) Oral daily  sodium chloride 0.9%. 1000 milliLiter(s) (100 mL/Hr) IV Continuous <Continuous>  triamcinolone 0.1% Ointment 1 Application(s) Topical two times a day  vancomycin  IVPB 1000 milliGRAM(s) IV Intermittent every 12 hours    MEDICATIONS  (PRN):  acetaminophen   Tablet .. 650 milliGRAM(s) Oral every 6 hours PRN Temp greater or equal to 38C (100.4F), Mild Pain (1 - 3)  diphenhydrAMINE 25 milliGRAM(s) Oral every 6 hours PRN Rash and/or Itching  oxyCODONE    IR 5 milliGRAM(s) Oral four times a day PRN Severe Pain (7 - 10)      Vital Signs Last 24 Hrs  T(C): 36.9 (15 Feb 2019 05:12), Max: 39.4 (2019 21:20)  T(F): 98.4 (15 Feb 2019 05:12), Max: 103 (2019 21:20)  HR: 94 (15 Feb 2019 07:00) (90 - 143)  BP: 92/53 (15 Feb 2019 07:00) (88/57 - 146/99)  BP(mean): 112 (2019 22:03) (112 - 112)  RR: 16 (15 Feb 2019 05:12) (16 - 20)  SpO2: 97% (15 Feb 2019 05:12) (94% - 98%)  CAPILLARY BLOOD GLUCOSE    I&O's Summary    PHYSICAL EXAM:  GENERAL: NAD, well-developed. Mentating well.   HEAD:  Atraumatic, Normocephalic  EYES: EOMI, PERRLA, conjunctiva and sclera clear  NECK: Supple, No JVD  CHEST/LUNG: Clear to auscultation bilaterally; No wheeze  HEART: Regular rate and rhythm; 2/6 systolic murmur over LUSB.   ABDOMEN: Soft, +tender to palpation, Nondistended; Bowel sounds present  EXTREMITIES:  2+ Peripheral Pulses, No clubbing, cyanosis, or edema. WWP.  PSYCH: AAOx3  NEUROLOGY: non-focal  SKIN: No rashes or lesions        LABS:                        10.0   29.20 )-----------( 304      ( 15 Feb 2019 10:39 )             32.2     02-15    131<L>  |  102  |  14  ----------------------------<  89  4.0   |  16<L>  |  0.85    Ca    7.3<L>      15 Feb 2019 15:49  Phos  2.8     02-15  Mg     2.2     -15    TPro  6.1  /  Alb  2.6<L>  /  TBili  0.7  /  DBili  x   /  AST  51<H>  /  ALT  13  /  AlkPhos  116  02-15    PT/INR - ( 15 Feb 2019 15:49 )   PT: 16.5 sec;   INR: 1.43 ratio         PTT - ( 15 Feb 2019 15:49 )  PTT:39.4 sec      Urinalysis Basic - ( 15 Feb 2019 00:17 )    Color: Yellow / Appearance: Clear / S.018 / pH: x  Gluc: x / Ketone: Negative  / Bili: Negative / Urobili: Negative   Blood: x / Protein: 30 mg/dL / Nitrite: Negative   Leuk Esterase: Negative / RBC: 3 /hpf / WBC 4 /HPF   Sq Epi: x / Non Sq Epi: 3 /hpf / Bacteria: Negative      WBC Trend: 29.20 K/uL<--, 18.5 K/uL<--, 19.9 K/uL<--, 21.1 K/uL  Hgb Trend: 10.0 g/dL<--, 12.4 g/dL<--, 12.6 g/dL<--, 13.0 g/dL  Hct Trend: 32.2 %<--, 36.9 %<--, 37.9 %<--, 39.9 %  Platelets Trend: 304 K/uL<--, 353 K/uL<--, 374 K/uL<--, 459 K/uL

## 2019-02-15 NOTE — H&P ADULT - PROBLEM SELECTOR PLAN 4
-unknown primary, although onc suspects pancreticobiliary  -onc c/s in am -unknown primary, although onc suspects pancreaticobiliary  -onc c/s in am -evaluated by dermatology who believes it to be a reaction to CTX vs iodine contrast media, now with new involvement of her face   -no evidence of mucosal or airway involvement, no evidence of angioedema  -c/w benadryl and zyrtec  -will initiate pepcid  -will hold off on steroids for now given possible infection  -avoid B lactams  -if becomes hypotensive, would consider epinephrine    -continue triamcinolone cream

## 2019-02-15 NOTE — CONSULT NOTE ADULT - ASSESSMENT
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     1) Gram Negative Bacteremia- symptoms likely related to sepsis  - continue empiric antibiotics with zosyn, deescalate as able   - continue supportive care, IVFs, anti pyretics as needed; will need to monitor fever curve   - etiology? 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     1) Gram Negative Bacteremia- symptoms likely related to sepsis  - continue empiric antibiotics with zosyn, deescalate as able   - continue supportive care, IVFs, anti pyretics as needed; will need to monitor fever curve   - etiology? nothing obvious on CT scan, no biliary dilatation; US abdomen pending to evaluate further as well as ascites   - likely related to underlying malignancy and obstructive effect     2) Metastatic Carcinoma of Unknown Primary  - possible pancreaticobiliary origin   - s/p C1 FOLFOX  - would hold further chemo while infected  - can f/u with Dr. Scruggs once acute issues resolve    Camille Navarrete, PGY-4  Hematology-Oncology Fellow  [phone] ([place]) [phone] ([facility])

## 2019-02-15 NOTE — ED ADULT NURSE REASSESSMENT NOTE - NS ED NURSE REASSESS COMMENT FT1
pt BP was recorded, Dr. May notified pager # [phone], pt is Asymptomatic, denies any lightheadedness or dizziness. pt is on room air with no signs of distress. MD wants 500ml NS bolus, Alta GRAHAM 5 LUZ aware.  will cont to monitor and assess.

## 2019-02-15 NOTE — PROGRESS NOTE ADULT - PROBLEM SELECTOR PLAN 3
Right adnexal dermoid ill-defined hypodensity of the left uterine body and fundal myometrium and fluid extending from the left adnexa visualized on outpt CT.  - will consider pelvic sono -appreciate onc [facility].

## 2019-02-16 LAB
ALBUMIN SERPL ELPH-MCNC: 2.8 G/DL — LOW (ref 3.3–5)
ALP SERPL-CCNC: 103 U/L — SIGNIFICANT CHANGE UP (ref 40–120)
ALT FLD-CCNC: 14 U/L — SIGNIFICANT CHANGE UP (ref 10–45)
ANION GAP SERPL CALC-SCNC: 14 MMOL/L — SIGNIFICANT CHANGE UP (ref 5–17)
AST SERPL-CCNC: 83 U/L — HIGH (ref 10–40)
BILIRUB SERPL-MCNC: 0.4 MG/DL — SIGNIFICANT CHANGE UP (ref 0.2–1.2)
BUN SERPL-MCNC: 11 MG/DL — SIGNIFICANT CHANGE UP (ref 7–23)
CALCIUM SERPL-MCNC: 7.7 MG/DL — LOW (ref 8.4–10.5)
CHLORIDE SERPL-SCNC: 98 MMOL/L — SIGNIFICANT CHANGE UP (ref 96–108)
CO2 SERPL-SCNC: 18 MMOL/L — LOW (ref 22–31)
CREAT SERPL-MCNC: 0.62 MG/DL — SIGNIFICANT CHANGE UP (ref 0.5–1.3)
GLUCOSE SERPL-MCNC: 55 MG/DL — LOW (ref 70–99)
HCT VFR BLD CALC: 29.2 % — LOW (ref 34.5–45)
HCV AB S/CO SERPL IA: 0.17 S/CO — SIGNIFICANT CHANGE UP
HCV AB SERPL-IMP: SIGNIFICANT CHANGE UP
HGB BLD-MCNC: 9.7 G/DL — LOW (ref 11.5–15.5)
INR BLD: 1.21 RATIO — HIGH (ref 0.88–1.16)
MAGNESIUM SERPL-MCNC: 2.2 MG/DL — SIGNIFICANT CHANGE UP (ref 1.6–2.6)
MCHC RBC-ENTMCNC: 29.5 PG — SIGNIFICANT CHANGE UP (ref 27–34)
MCHC RBC-ENTMCNC: 33.1 GM/DL — SIGNIFICANT CHANGE UP (ref 32–36)
MCV RBC AUTO: 88.9 FL — SIGNIFICANT CHANGE UP (ref 80–100)
PHOSPHATE SERPL-MCNC: 1.8 MG/DL — LOW (ref 2.5–4.5)
PLATELET # BLD AUTO: 283 K/UL — SIGNIFICANT CHANGE UP (ref 150–400)
POTASSIUM SERPL-MCNC: 3.6 MMOL/L — SIGNIFICANT CHANGE UP (ref 3.5–5.3)
POTASSIUM SERPL-SCNC: 3.6 MMOL/L — SIGNIFICANT CHANGE UP (ref 3.5–5.3)
PROT SERPL-MCNC: 6 G/DL — SIGNIFICANT CHANGE UP (ref 6–8.3)
PROTHROM AB SERPL-ACNC: 13.9 SEC — HIGH (ref 10–12.9)
RBC # BLD: 3.28 M/UL — LOW (ref 3.8–5.2)
RBC # FLD: 13.2 % — SIGNIFICANT CHANGE UP (ref 10.3–14.5)
SODIUM SERPL-SCNC: 130 MMOL/L — LOW (ref 135–145)
TRYPTASE SERPL-MCNC: 3 NG/ML — SIGNIFICANT CHANGE UP
WBC # BLD: 27 K/UL — HIGH (ref 3.8–10.5)
WBC # FLD AUTO: 27 K/UL — HIGH (ref 3.8–10.5)

## 2019-02-16 PROCEDURE — 99233 SBSQ HOSP IP/OBS HIGH 50: CPT | Mod: GC

## 2019-02-16 RX ORDER — POLYETHYLENE GLYCOL 3350 17 G/17G
17 POWDER, FOR SOLUTION ORAL DAILY
Qty: 0 | Refills: 0 | Status: DISCONTINUED | OUTPATIENT
Start: 2019-02-16 | End: 2019-02-20

## 2019-02-16 RX ORDER — SODIUM,POTASSIUM PHOSPHATES 278-250MG
1 POWDER IN PACKET (EA) ORAL
Qty: 0 | Refills: 0 | Status: COMPLETED | OUTPATIENT
Start: 2019-02-16 | End: 2019-02-17

## 2019-02-16 RX ORDER — ACETAMINOPHEN 500 MG
650 TABLET ORAL ONCE
Qty: 0 | Refills: 0 | Status: COMPLETED | OUTPATIENT
Start: 2019-02-16 | End: 2019-02-16

## 2019-02-16 RX ADMIN — Medication 2.5 MILLIGRAM(S): at 05:03

## 2019-02-16 RX ADMIN — Medication 650 MILLIGRAM(S): at 22:45

## 2019-02-16 RX ADMIN — PIPERACILLIN AND TAZOBACTAM 25 GRAM(S): 4; .5 INJECTION, POWDER, LYOPHILIZED, FOR SOLUTION INTRAVENOUS at 05:03

## 2019-02-16 RX ADMIN — Medication 650 MILLIGRAM(S): at 22:15

## 2019-02-16 RX ADMIN — MIDODRINE HYDROCHLORIDE 10 MILLIGRAM(S): 2.5 TABLET ORAL at 05:03

## 2019-02-16 RX ADMIN — POLYETHYLENE GLYCOL 3350 17 GRAM(S): 17 POWDER, FOR SOLUTION ORAL at 13:23

## 2019-02-16 RX ADMIN — Medication 1 TABLET(S): at 18:43

## 2019-02-16 RX ADMIN — PIPERACILLIN AND TAZOBACTAM 25 GRAM(S): 4; .5 INJECTION, POWDER, LYOPHILIZED, FOR SOLUTION INTRAVENOUS at 13:20

## 2019-02-16 RX ADMIN — Medication 1 APPLICATION(S): at 05:04

## 2019-02-16 RX ADMIN — Medication 2.5 MILLIGRAM(S): at 18:43

## 2019-02-16 RX ADMIN — PIPERACILLIN AND TAZOBACTAM 25 GRAM(S): 4; .5 INJECTION, POWDER, LYOPHILIZED, FOR SOLUTION INTRAVENOUS at 22:16

## 2019-02-16 RX ADMIN — Medication 1 APPLICATION(S): at 18:44

## 2019-02-16 RX ADMIN — Medication 1 TABLET(S): at 13:18

## 2019-02-16 RX ADMIN — Medication 1 TABLET(S): at 22:15

## 2019-02-16 RX ADMIN — FAMOTIDINE 20 MILLIGRAM(S): 10 INJECTION INTRAVENOUS at 13:21

## 2019-02-16 NOTE — PROGRESS NOTE ADULT - ASSESSMENT
69 yo F with metastatic CA of unknown primary, getting chemo w/ Dr. Scruggs as outpatient, who presents in septic shock, now w/ pressures maintaining after aggressive fluid resuscitation on oral midodrine and blood cultures growing gram negative rods. Most likely source is abdomen, CT ab/p w/ out abscess and U/S showing minimal ascites. Will continue to monitor on IV abx and follow workup.

## 2019-02-16 NOTE — PROGRESS NOTE ADULT - PROBLEM SELECTOR PLAN 2
- GNR bacteremia, on zosyn. Continue and narrow as possible.   - source likely GI, f/u urinary studies.   - repeat cultures pending now.

## 2019-02-16 NOTE — PROGRESS NOTE ADULT - SUBJECTIVE AND OBJECTIVE BOX
Kahlil Salcido  PGY-2 Resident Physician   Pager [phone] or [phone] from 7am to 7pm, after 7pm    Patient is a 68y old  Female who presents with a chief complaint of Fever (15 Feb 2019 12:33)    SUBJECTIVE / OVERNIGHT EVENTS:  No acute overnight events.     MEDICATIONS  (STANDING):  dronabinol 2.5 milliGRAM(s) Oral two times a day  famotidine    Tablet 20 milliGRAM(s) Oral daily  midodrine 10 milliGRAM(s) Oral every 8 hours  piperacillin/tazobactam IVPB. 3.375 Gram(s) IV Intermittent every 8 hours  polyethylene glycol 3350 17 Gram(s) Oral daily  potassium acid phosphate/sodium acid phosphate tablet (K-PHOS No. 2) 1 Tablet(s) Oral four times a day with meals  triamcinolone 0.1% Ointment 1 Application(s) Topical two times a day    MEDICATIONS  (PRN):  acetaminophen   Tablet .. 650 milliGRAM(s) Oral every 6 hours PRN Temp greater or equal to 38C (100.4F), Mild Pain (1 - 3)  diphenhydrAMINE 25 milliGRAM(s) Oral every 6 hours PRN Rash and/or Itching  oxyCODONE    IR 5 milliGRAM(s) Oral four times a day PRN Severe Pain (7 - 10)    Allergies    aspirin (Stomach Upset)  ceftriaxone (Rash)    Intolerances    Vital Signs Last 24 Hrs  T(C): 37.2 (2019 12:54), Max: 37.9 (2019 04:55)  T(F): 99 (2019 12:54), Max: 100.2 (2019 04:55)  HR: 100 (2019 12:54) (87 - 102)  BP: 114/65 (2019 12:54) (94/63 - 114/65)  RR: 18 (2019 12:54) (16 - 20)  SpO2: 95% (2019 12:54) (94% - 97%)  Daily     CAPILLARY BLOOD GLUCOSE      I&O's Summary    15 Feb 2019 07:01  -  2019 07:00  --------------------------------------------------------  IN: 600 mL / OUT: 0 mL / NET: 600 mL    PHYSICAL EXAM:  GENERAL: NAD, well-developed  HEAD:  Atraumatic, Normocephalic  EYES: EOMI, PERRLA, conjunctiva and sclera clear  NECK: Supple, No JVD  CHEST/LUNG: Clear to auscultation bilaterally; No wheeze  HEART: Regular rate and rhythm; Normal S1 S2, No murmurs, rubs, or gallops  ABDOMEN: Soft, Nontender, Nondistended; Bowel sounds present  EXTREMITIES:  2+ Peripheral Pulses, No clubbing, cyanosis, or edema  PSYCH: AAOx3  NEUROLOGY: non-focal  SKIN: No rashes or lesions    LABS:                        9.7    27.0  )-----------( 283      ( 2019 07:39 )             29.2     Hgb Trend: 9.7<--, 10.0<--, 12.4<--, 12.6<--  02-16    130<L>  |  98  |  11  ----------------------------<  55<L>  3.6   |  18<L>  |  0.62    Ca    7.7<L>      2019 07:24  Phos  1.8     02-16  Mg     2.2     02-16    TPro  6.0  /  Alb  2.8<L>  /  TBili  0.4  /  DBili  x   /  AST  83<H>  /  ALT  14  /  AlkPhos  103  02-16    Creatinine Trend: 0.62<--, 0.85<--, 0.89<--, 0.48<--  LIVER FUNCTIONS - ( 2019 07:24 )  Alb: 2.8 g/dL / Pro: 6.0 g/dL / ALK PHOS: 103 U/L / ALT: 14 U/L / AST: 83 U/L / GGT: x           PT/INR - ( 2019 07:39 )   PT: 13.9 sec;   INR: 1.21 ratio         PTT - ( 15 Feb 2019 15:49 )  PTT:39.4 sec      Urinalysis Basic - ( 15 Feb 2019 00:17 )    Color: Yellow / Appearance: Clear / S.018 / pH: x  Gluc: x / Ketone: Negative  / Bili: Negative / Urobili: Negative   Blood: x / Protein: 30 mg/dL / Nitrite: Negative   Leuk Esterase: Negative / RBC: 3 /hpf / WBC 4 /HPF   Sq Epi: x / Non Sq Epi: 3 /hpf / Bacteria: Negative        RADIOLOGY & ADDITIONAL TESTS:    Imaging Personally Reviewed:    Consultant(s) Notes Reviewed:      Care Discussed with Consultants/Other Providers:

## 2019-02-16 NOTE — PROGRESS NOTE ADULT - PROBLEM SELECTOR PLAN 1
- septic on admission, now improved, HD stable though on oral midodrine.  - will c/w zosyn  - will c/s IR, re: tap of abdominal ascites.   - f/u Bcxs - septic on admission, now improved, HD stable though on oral midodrine.  - will c/w zosyn  - no need to tap abdominal ascites.   - f/u BCxs

## 2019-02-16 NOTE — PROGRESS NOTE ADULT - ATTENDING COMMENTS
agree.  seen and examined multiple times, clinically much improved from yesterday. Hungry. Relaxing with family, fully alert and oriented, less lethargic, WWP. Still w/ tender abdomen (x weeks). Await sensitivities of culture, otherwise HD stable w/ improved pressures now, tolerating PO well, escalate to soft diet. Can attempt to wean midodrine tmrw pending clinical course. Family has many questions about chemo + tx moving forward for Dr. Scruggs and heme team, defer to them and appreciate their guidance. Continue zosyn. agree.  seen and examined multiple times, clinically much improved from yesterday. Has chronic leukocytosis w/ malignancy though higher now. Hungry. Relaxing with family, fully alert and oriented, less lethargic, WWP. Still w/ tender abdomen (x weeks). Await sensitivities of culture, otherwise HD stable w/ improved pressures now, tolerating PO well, escalate to soft diet. Can attempt to wean midodrine tmrw pending clinical course. Family has many questions about chemo + tx moving forward for Dr. Scruggs and heme team, defer to them and appreciate their guidance. Continue zosyn.

## 2019-02-17 ENCOUNTER — TRANSCRIPTION ENCOUNTER (OUTPATIENT)
Age: 69
End: 2019-02-17

## 2019-02-17 LAB
ALBUMIN SERPL ELPH-MCNC: 2.8 G/DL — LOW (ref 3.3–5)
ALP SERPL-CCNC: 122 U/L — HIGH (ref 40–120)
ALT FLD-CCNC: 10 U/L — SIGNIFICANT CHANGE UP (ref 10–45)
ANION GAP SERPL CALC-SCNC: 12 MMOL/L — SIGNIFICANT CHANGE UP (ref 5–17)
AST SERPL-CCNC: 95 U/L — HIGH (ref 10–40)
BILIRUB SERPL-MCNC: 0.5 MG/DL — SIGNIFICANT CHANGE UP (ref 0.2–1.2)
BUN SERPL-MCNC: 5 MG/DL — LOW (ref 7–23)
CALCIUM SERPL-MCNC: 8.4 MG/DL — SIGNIFICANT CHANGE UP (ref 8.4–10.5)
CHLORIDE SERPL-SCNC: 103 MMOL/L — SIGNIFICANT CHANGE UP (ref 96–108)
CO2 SERPL-SCNC: 21 MMOL/L — LOW (ref 22–31)
CREAT SERPL-MCNC: 0.43 MG/DL — LOW (ref 0.5–1.3)
GLUCOSE SERPL-MCNC: 74 MG/DL — SIGNIFICANT CHANGE UP (ref 70–99)
HCT VFR BLD CALC: 31.2 % — LOW (ref 34.5–45)
HGB BLD-MCNC: 10.5 G/DL — LOW (ref 11.5–15.5)
MAGNESIUM SERPL-MCNC: 2.2 MG/DL — SIGNIFICANT CHANGE UP (ref 1.6–2.6)
MCHC RBC-ENTMCNC: 29.7 PG — SIGNIFICANT CHANGE UP (ref 27–34)
MCHC RBC-ENTMCNC: 33.6 GM/DL — SIGNIFICANT CHANGE UP (ref 32–36)
MCV RBC AUTO: 88.5 FL — SIGNIFICANT CHANGE UP (ref 80–100)
PHOSPHATE SERPL-MCNC: 2.3 MG/DL — LOW (ref 2.5–4.5)
PLATELET # BLD AUTO: 268 K/UL — SIGNIFICANT CHANGE UP (ref 150–400)
POTASSIUM SERPL-MCNC: 3.6 MMOL/L — SIGNIFICANT CHANGE UP (ref 3.5–5.3)
POTASSIUM SERPL-SCNC: 3.6 MMOL/L — SIGNIFICANT CHANGE UP (ref 3.5–5.3)
PROT SERPL-MCNC: 6.4 G/DL — SIGNIFICANT CHANGE UP (ref 6–8.3)
RBC # BLD: 3.52 M/UL — LOW (ref 3.8–5.2)
RBC # FLD: 13.2 % — SIGNIFICANT CHANGE UP (ref 10.3–14.5)
SODIUM SERPL-SCNC: 136 MMOL/L — SIGNIFICANT CHANGE UP (ref 135–145)
WBC # BLD: 23.2 K/UL — HIGH (ref 3.8–10.5)
WBC # FLD AUTO: 23.2 K/UL — HIGH (ref 3.8–10.5)

## 2019-02-17 PROCEDURE — 99233 SBSQ HOSP IP/OBS HIGH 50: CPT | Mod: GC

## 2019-02-17 PROCEDURE — 71045 X-RAY EXAM CHEST 1 VIEW: CPT | Mod: 26

## 2019-02-17 RX ORDER — POTASSIUM PHOSPHATE, MONOBASIC POTASSIUM PHOSPHATE, DIBASIC 236; 224 MG/ML; MG/ML
15 INJECTION, SOLUTION INTRAVENOUS ONCE
Qty: 0 | Refills: 0 | Status: COMPLETED | OUTPATIENT
Start: 2019-02-17 | End: 2019-02-17

## 2019-02-17 RX ADMIN — POLYETHYLENE GLYCOL 3350 17 GRAM(S): 17 POWDER, FOR SOLUTION ORAL at 11:43

## 2019-02-17 RX ADMIN — PIPERACILLIN AND TAZOBACTAM 25 GRAM(S): 4; .5 INJECTION, POWDER, LYOPHILIZED, FOR SOLUTION INTRAVENOUS at 05:17

## 2019-02-17 RX ADMIN — PIPERACILLIN AND TAZOBACTAM 25 GRAM(S): 4; .5 INJECTION, POWDER, LYOPHILIZED, FOR SOLUTION INTRAVENOUS at 13:09

## 2019-02-17 RX ADMIN — Medication 2.5 MILLIGRAM(S): at 18:17

## 2019-02-17 RX ADMIN — POTASSIUM PHOSPHATE, MONOBASIC POTASSIUM PHOSPHATE, DIBASIC 62.5 MILLIMOLE(S): 236; 224 INJECTION, SOLUTION INTRAVENOUS at 11:41

## 2019-02-17 RX ADMIN — Medication 1 APPLICATION(S): at 05:19

## 2019-02-17 RX ADMIN — Medication 1 TABLET(S): at 12:01

## 2019-02-17 RX ADMIN — OXYCODONE HYDROCHLORIDE 5 MILLIGRAM(S): 5 TABLET ORAL at 19:55

## 2019-02-17 RX ADMIN — Medication 2.5 MILLIGRAM(S): at 05:18

## 2019-02-17 RX ADMIN — Medication 1 APPLICATION(S): at 18:18

## 2019-02-17 RX ADMIN — PIPERACILLIN AND TAZOBACTAM 25 GRAM(S): 4; .5 INJECTION, POWDER, LYOPHILIZED, FOR SOLUTION INTRAVENOUS at 21:51

## 2019-02-17 RX ADMIN — OXYCODONE HYDROCHLORIDE 5 MILLIGRAM(S): 5 TABLET ORAL at 20:25

## 2019-02-17 RX ADMIN — FAMOTIDINE 20 MILLIGRAM(S): 10 INJECTION INTRAVENOUS at 11:43

## 2019-02-17 NOTE — PROGRESS NOTE ADULT - SUBJECTIVE AND OBJECTIVE BOX
Dr. Jaime Jacques  Internal Medicine PGY-1   Pager# [phone]    Patient is a 68y old  Female who presents with a chief complaint of Fever (16 Feb 2019 15:50)      SUBJECTIVE / OVERNIGHT EVENTS:    MEDICATIONS  (STANDING):  dronabinol 2.5 milliGRAM(s) Oral two times a day  famotidine    Tablet 20 milliGRAM(s) Oral daily  midodrine 10 milliGRAM(s) Oral every 8 hours  piperacillin/tazobactam IVPB. 3.375 Gram(s) IV Intermittent every 8 hours  polyethylene glycol 3350 17 Gram(s) Oral daily  potassium acid phosphate/sodium acid phosphate tablet (K-PHOS No. 2) 1 Tablet(s) Oral four times a day with meals  triamcinolone 0.1% Ointment 1 Application(s) Topical two times a day    MEDICATIONS  (PRN):  diphenhydrAMINE 25 milliGRAM(s) Oral every 6 hours PRN Rash and/or Itching  oxyCODONE    IR 5 milliGRAM(s) Oral four times a day PRN Severe Pain (7 - 10)      Vital Signs Last 24 Hrs  T(C): 36.8 (17 Feb 2019 05:15), Max: 38 (16 Feb 2019 21:35)  T(F): 98.2 (17 Feb 2019 05:15), Max: 100.4 (16 Feb 2019 21:35)  HR: 87 (17 Feb 2019 05:15) (82 - 105)  BP: 139/82 (17 Feb 2019 05:15) (113/68 - 142/81)  BP(mean): --  RR: 16 (17 Feb 2019 05:15) (16 - 20)  SpO2: 96% (17 Feb 2019 05:15) (95% - 98%)  CAPILLARY BLOOD GLUCOSE        I&O's Summary    16 Feb 2019 07:01  -  17 Feb 2019 07:00  --------------------------------------------------------  IN: 240 mL / OUT: 0 mL / NET: 240 mL        PHYSICAL EXAM:  GENERAL: NAD, well-developed  HEAD:  Atraumatic, Normocephalic  EYES: EOMI, PERRLA, conjunctiva and sclera clear  NECK: Supple, No JVD  CHEST/LUNG: Clear to auscultation bilaterally; No wheeze  HEART: Regular rate and rhythm; No murmurs, rubs, or gallops  ABDOMEN: Soft, Nontender, Nondistended; Bowel sounds present  EXTREMITIES:  2+ Peripheral Pulses, No clubbing, cyanosis, or edema  PSYCH: AAOx3  NEUROLOGY: non-focal  SKIN: No rashes or lesions Dr. Jaime Jacques  Internal Medicine PGY-1   Pager# [phone]    Patient is a 68y old  Female who presents with a chief complaint of Fever (16 Feb 2019 15:50)      SUBJECTIVE / OVERNIGHT EVENTS: Patient had a fever of 100.4 with tachycardia 105. Was given acetaminophen.     MEDICATIONS  (STANDING):  dronabinol 2.5 milliGRAM(s) Oral two times a day  famotidine    Tablet 20 milliGRAM(s) Oral daily  midodrine 10 milliGRAM(s) Oral every 8 hours  piperacillin/tazobactam IVPB. 3.375 Gram(s) IV Intermittent every 8 hours  polyethylene glycol 3350 17 Gram(s) Oral daily  potassium acid phosphate/sodium acid phosphate tablet (K-PHOS No. 2) 1 Tablet(s) Oral four times a day with meals  triamcinolone 0.1% Ointment 1 Application(s) Topical two times a day    MEDICATIONS  (PRN):  diphenhydrAMINE 25 milliGRAM(s) Oral every 6 hours PRN Rash and/or Itching  oxyCODONE    IR 5 milliGRAM(s) Oral four times a day PRN Severe Pain (7 - 10)      Vital Signs Last 24 Hrs  T(C): 36.8 (17 Feb 2019 05:15), Max: 38 (16 Feb 2019 21:35)  T(F): 98.2 (17 Feb 2019 05:15), Max: 100.4 (16 Feb 2019 21:35)  HR: 87 (17 Feb 2019 05:15) (82 - 105)  BP: 139/82 (17 Feb 2019 05:15) (113/68 - 142/81)  BP(mean): --  RR: 16 (17 Feb 2019 05:15) (16 - 20)  SpO2: 96% (17 Feb 2019 05:15) (95% - 98%)  CAPILLARY BLOOD GLUCOSE        I&O's Summary    16 Feb 2019 07:01  -  17 Feb 2019 07:00  --------------------------------------------------------  IN: 240 mL / OUT: 0 mL / NET: 240 mL        PHYSICAL EXAM:  GENERAL: NAD, well-developed  HEAD:  Atraumatic, Normocephalic  EYES: EOMI, PERRLA, conjunctiva and sclera clear  NECK: Supple, No JVD  CHEST/LUNG: Clear to auscultation bilaterally; No wheeze  HEART: Regular rate and rhythm; No murmurs, rubs, or gallops  ABDOMEN: Soft, Nontender, Nondistended; Bowel sounds present  EXTREMITIES:  2+ Peripheral Pulses, No clubbing, cyanosis, or edema  PSYCH: AAOx3  NEUROLOGY: non-focal  SKIN: No rashes or lesions Dr. Jaime Jacques  Internal Medicine PGY-1   Pager# [phone]    Patient is a 68y old  Female who presents with a chief complaint of Fever (16 Feb 2019 15:50)      SUBJECTIVE / OVERNIGHT EVENTS: Patient had a fever of 100.4 with tachycardia 105. Was given acetaminophen. At bedside patient reports diffuse abdominal pain not related to PO intake or BMs. Feels overall better. Denies SOB, chest pain, dysuria. 	    MEDICATIONS  (STANDING):  dronabinol 2.5 milliGRAM(s) Oral two times a day  famotidine    Tablet 20 milliGRAM(s) Oral daily  midodrine 10 milliGRAM(s) Oral every 8 hours  piperacillin/tazobactam IVPB. 3.375 Gram(s) IV Intermittent every 8 hours  polyethylene glycol 3350 17 Gram(s) Oral daily  potassium acid phosphate/sodium acid phosphate tablet (K-PHOS No. 2) 1 Tablet(s) Oral four times a day with meals  triamcinolone 0.1% Ointment 1 Application(s) Topical two times a day    MEDICATIONS  (PRN):  diphenhydrAMINE 25 milliGRAM(s) Oral every 6 hours PRN Rash and/or Itching  oxyCODONE    IR 5 milliGRAM(s) Oral four times a day PRN Severe Pain (7 - 10)      Vital Signs Last 24 Hrs  T(C): 36.8 (17 Feb 2019 05:15), Max: 38 (16 Feb 2019 21:35)  T(F): 98.2 (17 Feb 2019 05:15), Max: 100.4 (16 Feb 2019 21:35)  HR: 87 (17 Feb 2019 05:15) (82 - 105)  BP: 139/82 (17 Feb 2019 05:15) (113/68 - 142/81)  BP(mean): --  RR: 16 (17 Feb 2019 05:15) (16 - 20)  SpO2: 96% (17 Feb 2019 05:15) (95% - 98%)  CAPILLARY BLOOD GLUCOSE        I&O's Summary    16 Feb 2019 07:01  -  17 Feb 2019 07:00  --------------------------------------------------------  IN: 240 mL / OUT: 0 mL / NET: 240 mL        PHYSICAL EXAM:  GENERAL: NAD,  HEAD:  Atraumatic, Normocephalic  EYES: EOMI, PERRLA, conjunctiva and sclera clear  NECK: Supple, No JVD  CHEST/LUNG: Some inspiratory crackles in R. posterior mid lung field. No wheezes. Chemoport access located right of sternum c/d/i.    HEART: S1, S2 2/6 soft systolic murmur appreciated loudest on LSB.   ABDOMEN: Soft, BS+, mild tenderness in all quadrants. No peritoneal signs.   EXTREMITIES:  2+ Peripheral Pulses, No clubbing, cyanosis, or edema  PSYCH: AAOx3  NEUROLOGY: non-focal  SKIN: No rashes or lesions      LABS:                        10.5   23.2  )-----------( 268      ( 17 Feb 2019 07:28 )             31.2     02-17    136  |  103  |  5<L>  ----------------------------<  74  3.6   |  21<L>  |  0.43<L>    Ca    8.4      17 Feb 2019 07:28  Phos  2.3     02-17  Mg     2.2     02-17    TPro  6.4  /  Alb  2.8<L>  /  TBili  0.5  /  DBili  x   /  AST  95<H>  /  ALT  10  /  AlkPhos  122<H>  02-17    PT/INR - ( 16 Feb 2019 07:39 )   PT: 13.9 sec;   INR: 1.21 ratio         PTT - ( 15 Feb 2019 15:49 )  PTT:39.4 sec    WBC Trend: 23.2 K/uL<--, 27.0 K/uL<--, 29.20 K/uL<--, 18.5 K/uL  Hgb Trend: 10.5 g/dL<--, 9.7 g/dL<--, 10.0 g/dL<--, 12.4 g/dL  Hct Trend: 31.2 %<--, 29.2 %<--, 32.2 %<--, 36.9 %  Platelets Trend: 268 K/uL<--, 283 K/uL<--, 304 K/uL<--, 353 K/uL    Culture - Blood (02.15.19 @ 22:21)    Specimen Source: .Blood Blood    Culture Results:   No growth to date.    Culture - Blood (02.15.19 @ 22:21)    Specimen Source: .Blood Blood    Culture Results:   No growth to date. Dr. Jaime Jacques  Internal Medicine PGY-1   Pager# [phone]    Patient is a 68y old  Female who presents with a chief complaint of Fever (16 Feb 2019 15:50)      SUBJECTIVE / OVERNIGHT EVENTS: Patient had a fever of 100.4 with tachycardia 105. Was given acetaminophen. At bedside patient reports diffuse abdominal pain not related to PO intake or BMs. Feels overall better. Denies SOB, chest pain, dysuria. 	    MEDICATIONS  (STANDING):  dronabinol 2.5 milliGRAM(s) Oral two times a day  famotidine    Tablet 20 milliGRAM(s) Oral daily  midodrine 10 milliGRAM(s) Oral every 8 hours  piperacillin/tazobactam IVPB. 3.375 Gram(s) IV Intermittent every 8 hours  polyethylene glycol 3350 17 Gram(s) Oral daily  potassium acid phosphate/sodium acid phosphate tablet (K-PHOS No. 2) 1 Tablet(s) Oral four times a day with meals  triamcinolone 0.1% Ointment 1 Application(s) Topical two times a day    MEDICATIONS  (PRN):  diphenhydrAMINE 25 milliGRAM(s) Oral every 6 hours PRN Rash and/or Itching  oxyCODONE    IR 5 milliGRAM(s) Oral four times a day PRN Severe Pain (7 - 10)      Vital Signs Last 24 Hrs  T(C): 36.8 (17 Feb 2019 05:15), Max: 38 (16 Feb 2019 21:35)  T(F): 98.2 (17 Feb 2019 05:15), Max: 100.4 (16 Feb 2019 21:35)  HR: 87 (17 Feb 2019 05:15) (82 - 105)  BP: 139/82 (17 Feb 2019 05:15) (113/68 - 142/81)  BP(mean): --  RR: 16 (17 Feb 2019 05:15) (16 - 20)  SpO2: 96% (17 Feb 2019 05:15) (95% - 98%)  CAPILLARY BLOOD GLUCOSE        I&O's Summary    16 Feb 2019 07:01  -  17 Feb 2019 07:00  --------------------------------------------------------  IN: 240 mL / OUT: 0 mL / NET: 240 mL        PHYSICAL EXAM:  GENERAL: NAD,  HEAD:  Atraumatic, Normocephalic  EYES: EOMI, PERRLA, conjunctiva and sclera clear  NECK: Supple, No JVD  CHEST/LUNG: Some inspiratory crackles in R. posterior mid lung field. No wheezes. Chemoport access located right of sternum c/d/i.    HEART: S1, S2 2/6 soft systolic murmur appreciated loudest on LSB.   ABDOMEN: Soft, BS+, mild tenderness in all quadrants. No peritoneal signs.   EXTREMITIES:  2+ Peripheral Pulses, No clubbing, cyanosis, or edema  PSYCH: AAOx3  NEUROLOGY: non-focal  SKIN: No rashes or lesions      LABS:                        10.5   23.2  )-----------( 268      ( 17 Feb 2019 07:28 )             31.2     02-17    136  |  103  |  5<L>  ----------------------------<  74  3.6   |  21<L>  |  0.43<L>    Ca    8.4      17 Feb 2019 07:28  Phos  2.3     02-17  Mg     2.2     02-17    TPro  6.4  /  Alb  2.8<L>  /  TBili  0.5  /  DBili  x   /  AST  95<H>  /  ALT  10  /  AlkPhos  122<H>  02-17    PT/INR - ( 16 Feb 2019 07:39 )   PT: 13.9 sec;   INR: 1.21 ratio         PTT - ( 15 Feb 2019 15:49 )  PTT:39.4 sec    WBC Trend: 23.2 K/uL<--, 27.0 K/uL<--, 29.20 K/uL<--, 18.5 K/uL  Hgb Trend: 10.5 g/dL<--, 9.7 g/dL<--, 10.0 g/dL<--, 12.4 g/dL  Hct Trend: 31.2 %<--, 29.2 %<--, 32.2 %<--, 36.9 %  Platelets Trend: 268 K/uL<--, 283 K/uL<--, 304 K/uL<--, 353 K/uL

## 2019-02-17 NOTE — DISCHARGE NOTE ADULT - PATIENT PORTAL LINK FT
You can access the [email or web address] Patient Portal, offered by [facility], by registering with the following website: [email or web address]

## 2019-02-17 NOTE — DISCHARGE NOTE ADULT - CARE PLAN
Principal Discharge DX:	Bacteremia  Goal:	Treat your underlying infection.  Assessment and plan of treatment:	You were found to have a severe infection causing you to undergo severe sepsis. We treated you IV fluids and medications to artificially raise your blood pressure while treating you with IV antibiotics. Bacteria called Ecoli were found in your blood for which we narrowed our IV antibiotics to. Repeat cultures of your blood showed resolution of this and you were weaned off the medications keeping your blood pressure elevated.  Secondary Diagnosis:	Metastatic cancer  Goal:	Assess your cancer.  Assessment and plan of treatment:	The Hematology/Oncology doctors were consulted to come evaluate and recommended treatment of your infection prior to initiating any chemotherapy for your cancer. Principal Discharge DX:	Bacteremia  Goal:	Please take your antibiotics as prescribed. Please follow up with your primary care doctor in 1 week.  Assessment and plan of treatment:	You were found to have a severe infection causing you to undergo severe sepsis. We treated you IV fluids and medications to artificially raise your blood pressure while treating you with IV antibiotics. Bacteria called Ecoli were found in your blood for which we narrowed our IV antibiotics to. Repeat cultures of your blood showed resolution of this and you were weaned off the medications keeping your blood pressure elevated.  Secondary Diagnosis:	Metastatic cancer  Goal:	Follow up with your cancer doctor (oncologist)  Assessment and plan of treatment:	The Hematology/Oncology doctors were consulted to come evaluate and recommended treatment of your infection prior to initiating any chemotherapy for your cancer. Principal Discharge DX:	Bacteremia  Goal:	Please take your antibiotics as prescribed. Please follow up with your primary care doctor in 1 week.  Assessment and plan of treatment:	You were found to have a severe infection. We treated you with intravenous fluids and medications to artificially raise your blood pressure, while treating you with IV antibiotics. Bacteria called E. Coli was found in your blood, for which you will continue  Secondary Diagnosis:	Metastatic cancer  Goal:	Follow up with your cancer doctor (oncologist)  Assessment and plan of treatment:	The Hematology/Oncology doctors were consulted to come evaluate and recommended treatment of your infection prior to initiating any chemotherapy for your cancer. Please follow up with your cancer doctor in 1 to 2 weeks after discharge. Principal Discharge DX:	Bacteremia  Goal:	Please take your antibiotics as prescribed and follow up with your primary care doctor in 1 week.  Assessment and plan of treatment:	You were found to have a severe infection. We treated you with fluids and medications to raise your blood pressure. Bacteria called E. Coli was found in your blood, so we started you on intravenous antibiotics. Please complete your antibiotics until February 28. Please follow up with your primary care doctor in 1 week after discharge.  Secondary Diagnosis:	Metastatic cancer  Goal:	Follow up with your cancer doctor (oncologist)  Assessment and plan of treatment:	The Hematology/Oncology doctors were consulted to evaluate you. They recommended treatment of your infection prior to initiating any chemotherapy for your cancer. Please follow up with your cancer doctor in 1 to 2 weeks after discharge. You were also noted to have some minimal bleeding from your vagina, which is new. Please follow-up with your cancer doctor (Dr. Scruggs), to further evaluate you symptoms and create  a treatment plan.

## 2019-02-17 NOTE — DISCHARGE NOTE ADULT - ADDITIONAL INSTRUCTIONS
Please continue to take your intravenous antibiotics at home (Ertapenem) until 2/28.   Please follow up with your primary care doctor in 1 week.  Please follow up with your oncology (cancer doctor) in 1-2 weeks.   If your abdominal pain worsens or you feel worse, please call your primary care doctor or your cancer doctor and come to the ED.

## 2019-02-17 NOTE — PROGRESS NOTE ADULT - ASSESSMENT
67 yo F with metastatic CA of unknown primary, getting chemo w/ Dr. Scruggs as outpatient, who presents in septic shock, now w/ pressures maintaining after aggressive fluid resuscitation on oral midodrine and blood cultures growing gram negative rods. Most likely source is abdomen, CT ab/p w/ out abscess and U/S showing minimal ascites. Will continue to monitor on IV abx and follow workup. 67 yo F with metastatic CA of unknown primary, getting chemo w/ Dr. Scruggs as outpatient, who presents in septic shock, now w/ pressures maintaining after aggressive fluid resuscitation on oral midodrine and blood cultures growing gram negative rods. Most likely source is abdomen, CT ab/p w/ out abscess and U/S showing minimal ascites. Will continue to monitor on IV abx and follow workup. Patient continues to improve not requiring oral pressors, with repeat BCxs NGD and downtrending WBC count.

## 2019-02-17 NOTE — PROGRESS NOTE ADULT - ATTENDING COMMENTS
agree.  seen and examined.  much improved HD status and improving white count, cultures rapidly cleared, await speciation of e coli. from translocation in abdomen.   cont zosyn. stop midodrine.  d/w team, patient and family.

## 2019-02-17 NOTE — DISCHARGE NOTE ADULT - CARE PROVIDER_API CALL
Lanny Scruggs (DO)  HematologyOncology; Internal Medicine  [address] [place] [address]  Phone: [phone]  Fax: [phone]  Follow Up Time:

## 2019-02-17 NOTE — DISCHARGE NOTE ADULT - MEDICATION SUMMARY - MEDICATIONS TO TAKE
I will START or STAY ON the medications listed below when I get home from the hospital:    oxyCODONE 5 mg oral capsule  -- 1 cap(s) by mouth every 6 hours, As Needed  -- Indication: For Cancer    dronabinol 2.5 mg oral capsule  -- 1 cap(s) by mouth 2 times a day  -- Indication: For Cancer    ZyrTEC 10 mg oral tablet  -- 1 tab(s) by mouth once a day  -- Indication: For Need for prophylactic measure    ertapenem 1 g injection  -- 1 gram(s) injectable once a day for sepsis due to E. Coli (ICD10 A41.51). Last day 2/28/19.   -- Indication: For Bacteremia    triamcinolone 0.1% topical ointment  -- Apply on skin to affected area 2 times a day  -- Indication: For Need for prophylactic measure    famotidine 20 mg oral tablet  -- 1 tab(s) by mouth once a day  -- Indication: For Need for prophylactic measure    polyethylene glycol 3350 oral powder for reconstitution  -- 17 gram(s) by mouth once a day  -- Indication: For Need for prophylactic measure I will START or STAY ON the medications listed below when I get home from the hospital:    oxyCODONE 5 mg oral tablet  -- 1 tab(s) by mouth every 6 hours, As Needed -Severe Pain (7 - 10) MDD:4 tablets  -- Indication: For Cancer    dronabinol 2.5 mg oral capsule  -- 1 cap(s) by mouth 2 times a day  -- Indication: For Cancer    ZyrTEC 10 mg oral tablet  -- 1 tab(s) by mouth once a day  -- Indication: For Need for prophylactic measure    ertapenem 1 g injection  -- 1 gram(s) injectable once a day for sepsis due to E. Coli (ICD10 A41.51). Last day 2/28/19.   -- Indication: For Bacteremia    triamcinolone 0.1% topical ointment  -- Apply on skin to affected area 2 times a day  -- Indication: For Need for prophylactic measure    famotidine 20 mg oral tablet  -- 1 tab(s) by mouth once a day  -- Indication: For Need for prophylactic measure    polyethylene glycol 3350 oral powder for reconstitution  -- 17 gram(s) by mouth once a day  -- Indication: For Need for prophylactic measure I will START or STAY ON the medications listed below when I get home from the hospital:    oxyCODONE 5 mg oral tablet  -- 1 tab(s) by mouth every 6 hours, As Needed -Severe Pain (7 - 10) MDD:20 mg  -- Indication: For Cancer    dronabinol 2.5 mg oral capsule  -- 1 cap(s) by mouth 2 times a day  -- Indication: For Cancer    ZyrTEC 10 mg oral tablet  -- 1 tab(s) by mouth once a day  -- Indication: For Need for prophylactic measure    ertapenem 1 g injection  -- 1 gram(s) injectable once a day for sepsis due to E. Coli (ICD10 A41.51). Last day 2/28/19.   -- Indication: For Bacteremia    triamcinolone 0.1% topical ointment  -- Apply on skin to affected area 2 times a day  -- Indication: For Need for prophylactic measure    famotidine 20 mg oral tablet  -- 1 tab(s) by mouth once a day -for heartburn   -- Indication: For Need for prophylactic measure    polyethylene glycol 3350 oral powder for reconstitution  -- 17 gram(s) by mouth once a day  -- Indication: For Need for prophylactic measure

## 2019-02-17 NOTE — PROGRESS NOTE ADULT - PROBLEM SELECTOR PLAN 1
- septic on admission, now improved, HD stable though on oral midodrine.  - will c/w zosyn  - no need to tap abdominal ascites.   - f/u BCxs Sepsis on admission, continues to resolve.    - Stable off midodrine. Will d/c today.   - will c/w zosyn  - no need to tap abdominal ascites.   - Repeat BCx 2/15 NGD. Sepsis on admission, continues to resolve.    - Stable off midodrine. Will d/c today.   - will c/w Zosyn Day 3 (2/17).   - no need to tap abdominal ascites.   - Repeat BCx 2/15 NGD.

## 2019-02-17 NOTE — DISCHARGE NOTE ADULT - HOSPITAL COURSE
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 Patient first began to feel unwell yesterday; her  took her temperature and she was found to be febrile to Tmax 102F.  Per 's report, patient had recent placement of chemotherapy port on 2/7; she received ppx CTX after the procedure. Patient subsequently developed a rash on her chest, back, and arms ; dermatology saw pt and attributed it to CTX vs recently administered contrast media.  She was prescribed Benadryl and Zyrtec. ROS + for abdominal pain, which patient states has been ongoing for several months now. ROS otherwise negative for CP, HA, dizziness, dysphagia, N/V/D, dysuria, or hematuria. Patient provides minimal history due to lethargy. Her  has not noticed any difficulty breathing. Rash has worsened at home, but hasn't seem to have worsened while here in the hospital.     In the ED, patient was found to be febrile to Tmax 103F as well as tachycardic. She received NS x 2 L as well as vanco/cefepime x 1 dose.     HOSPITAL COURSE  Patient evaluated by the MICU after receiving IV abx and 4L IVF NS boluses, patient was started on Midodrine which improved her pressures and broad spectrum IV abx were resumed. Patients BCx came back positive with EColi bacteremia on 2/14 and was narrowed down to Zosyn with repeat BCx from 2/15 and 2/16 NGD. In the infectious work up CT chest/abdomen/pelvis findings were as written below.     Patients symptoms and blood pressures continued to improve and was weaned Midodrine on 2/17.       WORK UP    CT Chest 2/15: Mediastinal and bilateral hilar LAD. In addition subcentimeter bilateral retropectoral and axillary lymph nodes are present. Diffuse interlobular septal thickening bilaterally which maybe secondary to CHF.     CT A/P 2/15: Heterogenous mass in the right lobe of the liver, which appears to have increased in size since 1/12/19. Hepatomegaly. Retroperitoneal LAD unchanged. Small to moderate volume ascites increased since 1/12/19.     CXR 2/15: Negative   CXR 2/17: (Repeat cause pt was having productive sputum and crackles on exam)    U/S 2/15: Trace abdominal ascites. Trace R. pleural effusion.     Pro BNP 2/15: 2025    RVP 2/14: Negative   UA 2/15: Negative   BCx 2/14: GNR (E. 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 Patient first began to feel unwell yesterday; her  took her temperature and she was found to be febrile to Tmax 102F.  Per 's report, patient had recent placement of chemotherapy port on 2/7; she received ppx CTX after the procedure. Patient subsequently developed a rash on her chest, back, and arms ; dermatology saw pt and attributed it to CTX vs recently administered contrast media.  She was prescribed Benadryl and Zyrtec. ROS + for abdominal pain, which patient states has been ongoing for several months now. ROS otherwise negative for CP, HA, dizziness, dysphagia, N/V/D, dysuria, or hematuria. Patient provides minimal history due to lethargy. Her  has not noticed any difficulty breathing. Rash has worsened at home, but hasn't seem to have worsened while here in the hospital.     In the ED, patient was found to be febrile to Tmax 103F as well as tachycardic. She received NS x 2 L as well as vanco/cefepime x 1 dose.     HOSPITAL COURSE  Patient evaluated by the MICU after receiving IV abx and 4L IVF NS boluses, patient was started on Midodrine which improved her pressures and broad spectrum IV abx were resumed. Patients BCx came back positive with EColi bacteremia on 2/14 and was narrowed down to Zosyn with repeat BCx from 2/15 and 2/16 NGD. In the infectious work up CT chest/abdomen/pelvis findings were as written below.     Patient's symptoms and blood pressures continued to improve and was weaned Midodrine on 2/17. E Coli sensitivities returned and antibiotics were transitioned from zosyn to       WORK UP    CT Chest 2/15: Mediastinal and bilateral hilar LAD. In addition subcentimeter bilateral retropectoral and axillary lymph nodes are present. Diffuse interlobular septal thickening bilaterally which maybe secondary to CHF.     CT A/P 2/15: Heterogenous mass in the right lobe of the liver, which appears to have increased in size since 1/12/19. Hepatomegaly. Retroperitoneal LAD unchanged. Small to moderate volume ascites increased since 1/12/19.     CXR 2/15: Negative   CXR 2/17: (Repeat cause pt was having productive sputum and crackles on exam)    U/S 2/15: Trace abdominal ascites. Trace R. pleural effusion.     Pro BNP 2/15: 2025    RVP 2/14: Negative   UA 2/15: Negative   BCx 2/14: GNR (E. 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 Patient first began to feel unwell yesterday; her  took her temperature and she was found to be febrile to Tmax 102F.  Per 's report, patient had recent placement of chemotherapy port on 2/7; she received ppx CTX after the procedure. Patient subsequently developed a rash on her chest, back, and arms ; dermatology saw pt and attributed it to CTX vs recently administered contrast media.  She was prescribed Benadryl and Zyrtec. ROS + for abdominal pain, which patient states has been ongoing for several months now. ROS otherwise negative for CP, HA, dizziness, dysphagia, N/V/D, dysuria, or hematuria. Patient provides minimal history due to lethargy. Her  has not noticed any difficulty breathing. Rash has worsened at home, but hasn't seem to have worsened while here in the hospital.     In the ED, patient was found to be febrile to Tmax 103F as well as tachycardic. She received NS x 2 L as well as vanco/cefepime x 1 dose.     HOSPITAL COURSE  Patient was evaluated by MICU after receiving IV antibiotics and 4L NS boluses. She was started on Midodrine, which improved her pressures and broad spectrum antibiotics were resumed. Patient's blood cultures grew back E. Coli on 2/14; antibioitics were narrowed to Zosyn with repeat BCx from 2/15 and 2/16 showing NGD. In the infectious work up, CT chest/abdomen/pelvis findings were as written below. Patient's symptoms and blood pressures continued to improve and she was weaned off Midodrine on 2/17. E Coli sensitivities returned and antibiotics were transitioned from zosyn to ertapenem. Patient is now hemodynamically stable for discharge to home, with oncology follow up. Ertapenem will be administered through chemoport at home until 2/28/2019.      WORK UP    CT Chest 2/15: Mediastinal and bilateral hilar LAD. In addition subcentimeter bilateral retropectoral and axillary lymph nodes are present. Diffuse interlobular septal thickening bilaterally which maybe secondary to CHF.     CT A/P 2/15: Heterogenous mass in the right lobe of the liver, which appears to have increased in size since 1/12/19. Hepatomegaly. Retroperitoneal LAD unchanged. Small to moderate volume ascites increased since 1/12/19.     CXR 2/15: Negative   CXR 2/17: (Repeat cause pt was having productive sputum and crackles on exam)    U/S 2/15: Trace abdominal ascites. Trace R. pleural effusion.     Pro BNP 2/15: 2025    RVP 2/14: Negative   UA 2/15: Negative   BCx 2/14: GNR (E. Coli)   BCx 2/15: NGD + Port culture NGD   BCx 2/16: NGD + Port Culture PENDING RECEIVED   Hep C 2/15: Negative     Urine Studies 2/15  Brandy: <20  / UOsm 383 / Serum Osm 275/  Cr 116     TSH 2/15: 3.37    T4: 8.3   Cortisol 2/15: 20.3 (H)

## 2019-02-17 NOTE — DISCHARGE NOTE ADULT - PLAN OF CARE
Treat your underlying infection. You were found to have a severe infection causing you to undergo severe sepsis. We treated you IV fluids and medications to artificially raise your blood pressure while treating you with IV antibiotics. Bacteria called Ecoli were found in your blood for which we narrowed our IV antibiotics to. Repeat cultures of your blood showed resolution of this and you were weaned off the medications keeping your blood pressure elevated. Assess your cancer. The Hematology/Oncology doctors were consulted to come evaluate and recommended treatment of your infection prior to initiating any chemotherapy for your cancer. Please take your antibiotics as prescribed. Please follow up with your primary care doctor in 1 week. Follow up with your cancer doctor (oncologist) You were found to have a severe infection. We treated you with intravenous fluids and medications to artificially raise your blood pressure, while treating you with IV antibiotics. Bacteria called E. Coli was found in your blood, for which you will continue The Hematology/Oncology doctors were consulted to come evaluate and recommended treatment of your infection prior to initiating any chemotherapy for your cancer. Please follow up with your cancer doctor in 1 to 2 weeks after discharge. Please take your antibiotics as prescribed and follow up with your primary care doctor in 1 week. You were found to have a severe infection. We treated you with fluids and medications to raise your blood pressure. Bacteria called E. Coli was found in your blood, so we started you on intravenous antibiotics. Please complete your antibiotics until February 28. Please follow up with your primary care doctor in 1 week after discharge. The Hematology/Oncology doctors were consulted to evaluate you. They recommended treatment of your infection prior to initiating any chemotherapy for your cancer. Please follow up with your cancer doctor in 1 to 2 weeks after discharge. You were also noted to have some minimal bleeding from your vagina, which is new. Please follow-up with your cancer doctor (Dr. Scruggs), to further evaluate you symptoms and create  a treatment plan.

## 2019-02-17 NOTE — PROGRESS NOTE ADULT - PROBLEM SELECTOR PLAN 2
- GNR bacteremia, on zosyn. Continue and narrow as possible.   - source likely GI, f/u urinary studies.   - repeat cultures pending now. GNR bacteremia, on zosyn. Continue and narrow as possible.     - Repeat BCx 2/15 NGD.   - source likely GI, f/u urinary studies.

## 2019-02-17 NOTE — DISCHARGE NOTE ADULT - OTHER SIGNIFICANT FINDINGS
CT Chest 2/15: Mediastinal and bilateral hilar LAD. In addition subcentimeter bilateral retropectoral and axillary lymph nodes are present. Diffuse interlobular septal thickening bilaterally which maybe secondary to CHF.     CT A/P 2/15: Heterogenous mass in the right lobe of the liver, which appears to have increased in size since 1/12/19. Hepatomegaly. Retroperitoneal LAD unchanged. Small to moderate volume ascites increased since 1/12/19.     CXR 2/15: Negative   CXR 2/17: (Repeat cause pt was having productive sputum and crackles on exam)    U/S 2/15: Trace abdominal ascites. Trace R. pleural effusion.     Pro BNP 2/15: 2025    RVP 2/14: Negative   UA 2/15: Negative   BCx 2/14: GNR (E. Coli)   BCx 2/15: NGD + Port culture NGD   BCx 2/16: NGD + Port Culture PENDING RECEIVED   Hep C 2/15: Negative     Urine Studies 2/15  Brandy: <20  / UOsm 383 / Serum Osm 275/  Cr 116     TSH 2/15: 3.37    T4: 8.3   Cortisol 2/15: 20.3 (H)

## 2019-02-18 LAB
-  AMIKACIN: SIGNIFICANT CHANGE UP
-  AMPICILLIN/SULBACTAM: SIGNIFICANT CHANGE UP
-  AMPICILLIN: SIGNIFICANT CHANGE UP
-  AZTREONAM: SIGNIFICANT CHANGE UP
-  CEFAZOLIN: SIGNIFICANT CHANGE UP
-  CEFEPIME: SIGNIFICANT CHANGE UP
-  CEFOXITIN: SIGNIFICANT CHANGE UP
-  CEFTRIAXONE: SIGNIFICANT CHANGE UP
-  CIPROFLOXACIN: SIGNIFICANT CHANGE UP
-  ERTAPENEM: SIGNIFICANT CHANGE UP
-  GENTAMICIN: SIGNIFICANT CHANGE UP
-  IMIPENEM: SIGNIFICANT CHANGE UP
-  LEVOFLOXACIN: SIGNIFICANT CHANGE UP
-  MEROPENEM: SIGNIFICANT CHANGE UP
-  PIPERACILLIN/TAZOBACTAM: SIGNIFICANT CHANGE UP
-  TOBRAMYCIN: SIGNIFICANT CHANGE UP
-  TRIMETHOPRIM/SULFAMETHOXAZOLE: SIGNIFICANT CHANGE UP
ALBUMIN SERPL ELPH-MCNC: 2.8 G/DL — LOW (ref 3.3–5)
ALP SERPL-CCNC: 140 U/L — HIGH (ref 40–120)
ALT FLD-CCNC: 12 U/L — SIGNIFICANT CHANGE UP (ref 10–45)
ANION GAP SERPL CALC-SCNC: 12 MMOL/L — SIGNIFICANT CHANGE UP (ref 5–17)
AST SERPL-CCNC: 68 U/L — HIGH (ref 10–40)
BILIRUB SERPL-MCNC: 0.6 MG/DL — SIGNIFICANT CHANGE UP (ref 0.2–1.2)
BUN SERPL-MCNC: <4 MG/DL — LOW (ref 7–23)
CALCIUM SERPL-MCNC: 8.4 MG/DL — SIGNIFICANT CHANGE UP (ref 8.4–10.5)
CHLORIDE SERPL-SCNC: 102 MMOL/L — SIGNIFICANT CHANGE UP (ref 96–108)
CO2 SERPL-SCNC: 22 MMOL/L — SIGNIFICANT CHANGE UP (ref 22–31)
CREAT SERPL-MCNC: 0.38 MG/DL — LOW (ref 0.5–1.3)
CULTURE RESULTS: SIGNIFICANT CHANGE UP
CULTURE RESULTS: SIGNIFICANT CHANGE UP
GLUCOSE SERPL-MCNC: 87 MG/DL — SIGNIFICANT CHANGE UP (ref 70–99)
HCT VFR BLD CALC: 30.1 % — LOW (ref 34.5–45)
HGB BLD-MCNC: 10.2 G/DL — LOW (ref 11.5–15.5)
MAGNESIUM SERPL-MCNC: 2.1 MG/DL — SIGNIFICANT CHANGE UP (ref 1.6–2.6)
MCHC RBC-ENTMCNC: 29.8 PG — SIGNIFICANT CHANGE UP (ref 27–34)
MCHC RBC-ENTMCNC: 34 GM/DL — SIGNIFICANT CHANGE UP (ref 32–36)
MCV RBC AUTO: 87.8 FL — SIGNIFICANT CHANGE UP (ref 80–100)
METHOD TYPE: SIGNIFICANT CHANGE UP
ORGANISM # SPEC MICROSCOPIC CNT: SIGNIFICANT CHANGE UP
PHOSPHATE SERPL-MCNC: 2.2 MG/DL — LOW (ref 2.5–4.5)
PLATELET # BLD AUTO: 284 K/UL — SIGNIFICANT CHANGE UP (ref 150–400)
POTASSIUM SERPL-MCNC: 3.9 MMOL/L — SIGNIFICANT CHANGE UP (ref 3.5–5.3)
POTASSIUM SERPL-SCNC: 3.9 MMOL/L — SIGNIFICANT CHANGE UP (ref 3.5–5.3)
PROT SERPL-MCNC: 6.1 G/DL — SIGNIFICANT CHANGE UP (ref 6–8.3)
RBC # BLD: 3.43 M/UL — LOW (ref 3.8–5.2)
RBC # FLD: 13.3 % — SIGNIFICANT CHANGE UP (ref 10.3–14.5)
SODIUM SERPL-SCNC: 136 MMOL/L — SIGNIFICANT CHANGE UP (ref 135–145)
SPECIMEN SOURCE: SIGNIFICANT CHANGE UP
SPECIMEN SOURCE: SIGNIFICANT CHANGE UP
WBC # BLD: 19.8 K/UL — HIGH (ref 3.8–10.5)
WBC # FLD AUTO: 19.8 K/UL — HIGH (ref 3.8–10.5)

## 2019-02-18 PROCEDURE — 99233 SBSQ HOSP IP/OBS HIGH 50: CPT | Mod: GC

## 2019-02-18 RX ORDER — POTASSIUM PHOSPHATE, MONOBASIC POTASSIUM PHOSPHATE, DIBASIC 236; 224 MG/ML; MG/ML
15 INJECTION, SOLUTION INTRAVENOUS ONCE
Qty: 0 | Refills: 0 | Status: COMPLETED | OUTPATIENT
Start: 2019-02-18 | End: 2019-02-18

## 2019-02-18 RX ADMIN — POTASSIUM PHOSPHATE, MONOBASIC POTASSIUM PHOSPHATE, DIBASIC 62.5 MILLIMOLE(S): 236; 224 INJECTION, SOLUTION INTRAVENOUS at 11:15

## 2019-02-18 RX ADMIN — OXYCODONE HYDROCHLORIDE 5 MILLIGRAM(S): 5 TABLET ORAL at 06:22

## 2019-02-18 RX ADMIN — OXYCODONE HYDROCHLORIDE 5 MILLIGRAM(S): 5 TABLET ORAL at 22:10

## 2019-02-18 RX ADMIN — PIPERACILLIN AND TAZOBACTAM 25 GRAM(S): 4; .5 INJECTION, POWDER, LYOPHILIZED, FOR SOLUTION INTRAVENOUS at 21:34

## 2019-02-18 RX ADMIN — OXYCODONE HYDROCHLORIDE 5 MILLIGRAM(S): 5 TABLET ORAL at 21:34

## 2019-02-18 RX ADMIN — Medication 1 APPLICATION(S): at 18:59

## 2019-02-18 RX ADMIN — PIPERACILLIN AND TAZOBACTAM 25 GRAM(S): 4; .5 INJECTION, POWDER, LYOPHILIZED, FOR SOLUTION INTRAVENOUS at 14:58

## 2019-02-18 RX ADMIN — Medication 2.5 MILLIGRAM(S): at 05:48

## 2019-02-18 RX ADMIN — FAMOTIDINE 20 MILLIGRAM(S): 10 INJECTION INTRAVENOUS at 14:58

## 2019-02-18 RX ADMIN — Medication 1 APPLICATION(S): at 05:51

## 2019-02-18 RX ADMIN — OXYCODONE HYDROCHLORIDE 5 MILLIGRAM(S): 5 TABLET ORAL at 05:48

## 2019-02-18 RX ADMIN — PIPERACILLIN AND TAZOBACTAM 25 GRAM(S): 4; .5 INJECTION, POWDER, LYOPHILIZED, FOR SOLUTION INTRAVENOUS at 05:49

## 2019-02-18 RX ADMIN — Medication 2.5 MILLIGRAM(S): at 19:02

## 2019-02-18 NOTE — PROGRESS NOTE ADULT - ASSESSMENT
69 yo F with metastatic CA of unknown primary, getting chemo w/ Dr. Scruggs as outpatient, who presents in septic shock, now w/ pressures maintaining after aggressive fluid resuscitation on oral midodrine and blood cultures growing gram negative rods. Most likely source is abdomen, CT ab/p w/ out abscess and U/S showing minimal ascites. Will continue to monitor on IV abx and follow workup. Patient continues to improve not requiring oral pressors, with repeat BCxs NGD and downtrending WBC count. 69 yo F with metastatic CA of unknown primary, getting chemo w/ Dr. Scruggs as outpatient, who presents in septic shock, now w/ pressures maintaining after aggressive fluid resuscitation on oral midodrine and blood cultures growing E. Coli. Most likely source is abdomen, CT ab/p w/ out abscess and U/S showing minimal ascites. Will continue to monitor on IV abx and follow workup. Patient continues to improve not requiring oral pressors, with repeat BCxs NGD and downtrending WBC count.

## 2019-02-18 NOTE — PROGRESS NOTE ADULT - PROBLEM SELECTOR PLAN 2
GNR bacteremia, on zosyn. Continue and narrow as possible.     - Repeat BCx 2/15 NGD.   - source likely GI, f/u urinary studies. E coli bacteremia, on zosyn. Will change Abx based on sensitivities.    - Repeat BCx 2/15 NGD.   - source likely GI, f/u urinary studies.

## 2019-02-18 NOTE — PROGRESS NOTE ADULT - SUBJECTIVE AND OBJECTIVE BOX
Patient is a 68y old  Female who presents with a chief complaint of Fever (16 Feb 2019 15:50)      SUBJECTIVE / OVERNIGHT EVENTS: P    MEDICATIONS  (STANDING):      Vital Signs Last 24 Hrs      I&O's Summary        PHYSICAL EXAM:    CHEST/LUNG: Some inspiratory crackles in R. posterior mid lung field. No wheezes. Chemoport access located right of sternum c/d/i.    HEART: S1, S2 2/6 soft systolic murmur appreciated loudest on LSB.       LABS: Patient is a 68y old  Female who presents with a chief complaint of Fever (16 Feb 2019 15:50)    SUBJECTIVE / OVERNIGHT EVENTS: No events overnight, was having abdominal pain overnight, now resolve. No CP, SOB, nausea or vomiting.    MEDICATIONS  (STANDING):  dronabinol 2.5 milliGRAM(s) Oral two times a day  famotidine    Tablet 20 milliGRAM(s) Oral daily  piperacillin/tazobactam IVPB. 3.375 Gram(s) IV Intermittent every 8 hours  polyethylene glycol 3350 17 Gram(s) Oral daily  potassium phosphate IVPB 15 milliMole(s) IV Intermittent once  triamcinolone 0.1% Ointment 1 Application(s) Topical two times a day    MEDICATIONS  (PRN):  diphenhydrAMINE 25 milliGRAM(s) Oral every 6 hours PRN Rash and/or Itching  oxyCODONE    IR 5 milliGRAM(s) Oral four times a day PRN Severe Pain (7 - 10)    Vital Signs Last 24 Hrs  T(C): 37.5 (18 Feb 2019 04:18), Max: 37.5 (18 Feb 2019 04:18)  T(F): 99.5 (18 Feb 2019 04:18), Max: 99.5 (18 Feb 2019 04:18)  HR: 94 (18 Feb 2019 04:18) (85 - 108)  BP: 133/76 (18 Feb 2019 04:18) (130/85 - 133/76)  BP(mean): --  RR: 20 (18 Feb 2019 04:18) (17 - 20)  SpO2: 94% (18 Feb 2019 04:18) (94% - 96%)    I&O's Summary  17 Feb 2019 07:01  -  18 Feb 2019 07:00  --------------------------------------------------------  IN: 590 mL / OUT: 0 mL / NET: 590 mL    PHYSICAL EXAM:  GENERAL: sleeping comfortably, arousable  HEAD:  Atraumatic, Normocephalic  EYES: EOMI, conjunctiva and sclera clear  NECK: Supple, No JVD  CHEST/LUNG: No wheezes or ronchi. Chemoport access located right of sternum c/d/i.    HEART: S1, S2 present, RRR  ABDOMEN: Soft, Nontender, Nondistended; Bowel sounds present  EXTREMITIES:  2+ Peripheral Pulses, No clubbing, cyanosis, or edema  NEUROLOGY: non-focal  SKIN: No rashes or lesions    LABS:  CBC Full  -  ( 18 Feb 2019 06:45 )  WBC Count : 19.8 K/uL  Hemoglobin : 10.2 g/dL  Hematocrit : 30.1 %  Platelet Count - Automated : 284 K/uL  Mean Cell Volume : 87.8 fl  Mean Cell Hemoglobin : 29.8 pg  Mean Cell Hemoglobin Concentration : 34.0 gm/dL  Auto Neutrophil # : x  Auto Lymphocyte # : x  Auto Monocyte # : x  Auto Eosinophil # : x  Auto Basophil # : x  Auto Neutrophil % : x  Auto Lymphocyte % : x  Auto Monocyte % : x  Auto Eosinophil % : x  Auto Basophil % : x    02-18    136  |  102  |  <4<L>  ----------------------------<  87  3.9   |  22  |  0.38<L>    Ca    8.4      18 Feb 2019 06:42  Phos  2.2     02-18  Mg     2.1     02-18    TPro  6.1  /  Alb  2.8<L>  /  TBili  0.6  /  DBili  x   /  AST  68<H>  /  ALT  12  /  AlkPhos  140<H>  02-18      Culture - Blood (collected 16 Feb 2019 10:58)  Source: .Blood Blood-Peripheral  Preliminary Report (17 Feb 2019 11:01):    No growth to date.    Culture - Blood (collected 15 Feb 2019 22:21)  Source: .Blood Blood  Preliminary Report (16 Feb 2019 23:01):    No growth to date.    Culture - Blood (collected 15 Feb 2019 14:34)  Source: .Blood Port Device  Preliminary Report (16 Feb 2019 15:04):    No growth to date.

## 2019-02-18 NOTE — PROGRESS NOTE ADULT - PROBLEM SELECTOR PLAN 1
Sepsis on admission, continues to resolve.    - Stable off midodrine. Will d/c today.   - will c/w Zosyn Day 3 (2/17).   - no need to tap abdominal ascites.   - Repeat BCx 2/15 NGD. Sepsis on admission, continues to resolve.    - Stable off midodrine.  - will c/w Zosyn Day 4 (2/18).   - no need to tap abdominal ascites.   - Repeat BCx 2/15 NGD.

## 2019-02-18 NOTE — PROGRESS NOTE ADULT - ATTENDING COMMENTS
agree.  seen and examined, much improved from HD perspective, BP rebounded to 130s. E coli sensitive, cont zosyn for anaerobic coverage, when we switch to oral would cover empirically w/ flagyl too w/ presumed GI source given malignancy. To complete 14 day course.   fluid overload on CXR, though no SOB or trouble breathing, begin to ambulate and if desat would gently diurese.

## 2019-02-19 ENCOUNTER — APPOINTMENT (OUTPATIENT)
Dept: INTERNAL MEDICINE | Facility: CLINIC | Age: 69
End: 2019-02-19

## 2019-02-19 LAB
ALBUMIN SERPL ELPH-MCNC: 2.9 G/DL — LOW (ref 3.3–5)
ALP SERPL-CCNC: 136 U/L — HIGH (ref 40–120)
ALT FLD-CCNC: 10 U/L — SIGNIFICANT CHANGE UP (ref 10–45)
ANION GAP SERPL CALC-SCNC: 11 MMOL/L — SIGNIFICANT CHANGE UP (ref 5–17)
AST SERPL-CCNC: 47 U/L — HIGH (ref 10–40)
BASOPHILS # BLD AUTO: 0.1 K/UL — SIGNIFICANT CHANGE UP (ref 0–0.2)
BASOPHILS NFR BLD AUTO: 0 % — SIGNIFICANT CHANGE UP (ref 0–2)
BILIRUB SERPL-MCNC: 0.5 MG/DL — SIGNIFICANT CHANGE UP (ref 0.2–1.2)
BUN SERPL-MCNC: <4 MG/DL — LOW (ref 7–23)
CALCIUM SERPL-MCNC: 8.9 MG/DL — SIGNIFICANT CHANGE UP (ref 8.4–10.5)
CHLORIDE SERPL-SCNC: 99 MMOL/L — SIGNIFICANT CHANGE UP (ref 96–108)
CO2 SERPL-SCNC: 25 MMOL/L — SIGNIFICANT CHANGE UP (ref 22–31)
CREAT SERPL-MCNC: 0.37 MG/DL — LOW (ref 0.5–1.3)
EOSINOPHIL # BLD AUTO: 0.9 K/UL — HIGH (ref 0–0.5)
EOSINOPHIL NFR BLD AUTO: 2 % — SIGNIFICANT CHANGE UP (ref 0–6)
GLUCOSE SERPL-MCNC: 80 MG/DL — SIGNIFICANT CHANGE UP (ref 70–99)
HCT VFR BLD CALC: 29.7 % — LOW (ref 34.5–45)
HGB BLD-MCNC: 10.3 G/DL — LOW (ref 11.5–15.5)
LYMPHOCYTES # BLD AUTO: 13 % — SIGNIFICANT CHANGE UP (ref 13–44)
LYMPHOCYTES # BLD AUTO: 2.8 K/UL — SIGNIFICANT CHANGE UP (ref 1–3.3)
MAGNESIUM SERPL-MCNC: 1.9 MG/DL — SIGNIFICANT CHANGE UP (ref 1.6–2.6)
MCHC RBC-ENTMCNC: 30.5 PG — SIGNIFICANT CHANGE UP (ref 27–34)
MCHC RBC-ENTMCNC: 34.6 GM/DL — SIGNIFICANT CHANGE UP (ref 32–36)
MCV RBC AUTO: 88.1 FL — SIGNIFICANT CHANGE UP (ref 80–100)
MONOCYTES # BLD AUTO: 1.8 K/UL — HIGH (ref 0–0.9)
MONOCYTES NFR BLD AUTO: 9 % — SIGNIFICANT CHANGE UP (ref 2–14)
NEUTROPHILS # BLD AUTO: 13.7 K/UL — HIGH (ref 1.8–7.4)
NEUTROPHILS NFR BLD AUTO: 73 % — SIGNIFICANT CHANGE UP (ref 43–77)
PHOSPHATE SERPL-MCNC: 2.7 MG/DL — SIGNIFICANT CHANGE UP (ref 2.5–4.5)
PLATELET # BLD AUTO: 302 K/UL — SIGNIFICANT CHANGE UP (ref 150–400)
POTASSIUM SERPL-MCNC: 3.9 MMOL/L — SIGNIFICANT CHANGE UP (ref 3.5–5.3)
POTASSIUM SERPL-SCNC: 3.9 MMOL/L — SIGNIFICANT CHANGE UP (ref 3.5–5.3)
PROT SERPL-MCNC: 6.1 G/DL — SIGNIFICANT CHANGE UP (ref 6–8.3)
RBC # BLD: 3.37 M/UL — LOW (ref 3.8–5.2)
RBC # FLD: 13.3 % — SIGNIFICANT CHANGE UP (ref 10.3–14.5)
SODIUM SERPL-SCNC: 135 MMOL/L — SIGNIFICANT CHANGE UP (ref 135–145)
WBC # BLD: 19.2 K/UL — HIGH (ref 3.8–10.5)
WBC # FLD AUTO: 19.2 K/UL — HIGH (ref 3.8–10.5)

## 2019-02-19 PROCEDURE — 93306 TTE W/DOPPLER COMPLETE: CPT | Mod: 26

## 2019-02-19 PROCEDURE — 99233 SBSQ HOSP IP/OBS HIGH 50: CPT | Mod: GC

## 2019-02-19 RX ORDER — ENOXAPARIN SODIUM 100 MG/ML
40 INJECTION SUBCUTANEOUS DAILY
Qty: 0 | Refills: 0 | Status: DISCONTINUED | OUTPATIENT
Start: 2019-02-19 | End: 2019-02-20

## 2019-02-19 RX ORDER — ERTAPENEM SODIUM 1 G/1
1000 INJECTION, POWDER, LYOPHILIZED, FOR SOLUTION INTRAMUSCULAR; INTRAVENOUS ONCE
Qty: 0 | Refills: 0 | Status: COMPLETED | OUTPATIENT
Start: 2019-02-19 | End: 2019-02-19

## 2019-02-19 RX ORDER — ERTAPENEM SODIUM 1 G/1
INJECTION, POWDER, LYOPHILIZED, FOR SOLUTION INTRAMUSCULAR; INTRAVENOUS
Qty: 0 | Refills: 0 | Status: DISCONTINUED | OUTPATIENT
Start: 2019-02-19 | End: 2019-02-20

## 2019-02-19 RX ORDER — ERTAPENEM SODIUM 1 G/1
1 INJECTION, POWDER, LYOPHILIZED, FOR SOLUTION INTRAMUSCULAR; INTRAVENOUS
Qty: 9 | Refills: 0 | OUTPATIENT
Start: 2019-02-19 | End: 2019-02-27

## 2019-02-19 RX ORDER — ERTAPENEM SODIUM 1 G/1
1000 INJECTION, POWDER, LYOPHILIZED, FOR SOLUTION INTRAMUSCULAR; INTRAVENOUS EVERY 24 HOURS
Qty: 0 | Refills: 0 | Status: DISCONTINUED | OUTPATIENT
Start: 2019-02-20 | End: 2019-02-20

## 2019-02-19 RX ADMIN — PIPERACILLIN AND TAZOBACTAM 25 GRAM(S): 4; .5 INJECTION, POWDER, LYOPHILIZED, FOR SOLUTION INTRAVENOUS at 05:02

## 2019-02-19 RX ADMIN — Medication 1 APPLICATION(S): at 05:02

## 2019-02-19 RX ADMIN — ENOXAPARIN SODIUM 40 MILLIGRAM(S): 100 INJECTION SUBCUTANEOUS at 12:58

## 2019-02-19 RX ADMIN — Medication 2.5 MILLIGRAM(S): at 05:02

## 2019-02-19 RX ADMIN — FAMOTIDINE 20 MILLIGRAM(S): 10 INJECTION INTRAVENOUS at 12:58

## 2019-02-19 RX ADMIN — ERTAPENEM SODIUM 120 MILLIGRAM(S): 1 INJECTION, POWDER, LYOPHILIZED, FOR SOLUTION INTRAMUSCULAR; INTRAVENOUS at 13:23

## 2019-02-19 RX ADMIN — Medication 2.5 MILLIGRAM(S): at 17:50

## 2019-02-19 RX ADMIN — POLYETHYLENE GLYCOL 3350 17 GRAM(S): 17 POWDER, FOR SOLUTION ORAL at 12:58

## 2019-02-19 RX ADMIN — Medication 1 APPLICATION(S): at 17:51

## 2019-02-19 NOTE — PHYSICAL THERAPY INITIAL EVALUATION ADULT - PERTINENT HX OF CURRENT PROBLEM, REHAB EVAL
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  Pt first began to feel unwell yesterday; her  took her temperature and she was found to be febrile to Tmax 102F.  Per 's report, patient had recent placement of chemotherapy port on 2/7; she received ppx CTX after the procedure.

## 2019-02-19 NOTE — PROGRESS NOTE ADULT - ATTENDING COMMENTS
Pt with septic shock 2/2 E.Coli bacteremia, now improved s/p IV zosyn (day 5)  Repeat BCx negative since 2/15. given bacteremia, plan to complete total 14 day course of IV abx  Plan for midline for IV abx  dc planning    Tresa Bates MD  Division of Hospital Medicine  Pager: [phone]  Office: [phone]

## 2019-02-19 NOTE — PHYSICAL THERAPY INITIAL EVALUATION ADULT - ADDITIONAL COMMENTS
Pt lives with spouse in a private house with 3 steps to enter and one flight of steps inside but pt's bedroom is on the main level.  Pt was Ind with all ADLs and amb without AD.

## 2019-02-19 NOTE — PROGRESS NOTE ADULT - SUBJECTIVE AND OBJECTIVE BOX
Patient is a 68y old  Female who presents with a chief complaint of Fever (16 Feb 2019 15:50)    SUBJECTIVE / OVERNIGHT EVENTS:     MEDICATIONS  (STANDING):      Vital Signs Last 24 Hrs      I&O's Summary      PHYSICAL EXAM:  GENERAL: sleeping comfortably, arousable    CHEST/LUNG: No wheezes or ronchi. Chemoport access located right of sternum c/d/i.        LABS:        Culture - Blood (collected 16 Feb 2019 10:58)  Source: .Blood Blood-Peripheral  Preliminary Report (17 Feb 2019 11:01):    No growth to date.    Culture - Blood (collected 15 Feb 2019 22:21)  Source: .Blood Blood  Preliminary Report (16 Feb 2019 23:01):    No growth to date.    Culture - Blood (collected 15 Feb 2019 14:34)  Source: .Blood Port Device  Preliminary Report (16 Feb 2019 15:04):    No growth to date. Patient is a 68y old  Female who presents with a chief complaint of Fever (16 Feb 2019 15:50)    SUBJECTIVE / OVERNIGHT EVENTS: No acute events. Abdominal pain and coughing has improved. Remains afebrile.    MEDICATIONS  (STANDING):  dronabinol 2.5 milliGRAM(s) Oral two times a day  enoxaparin Injectable 40 milliGRAM(s) SubCutaneous daily  ertapenem  IVPB      ertapenem  IVPB 1000 milliGRAM(s) IV Intermittent once  famotidine    Tablet 20 milliGRAM(s) Oral daily  polyethylene glycol 3350 17 Gram(s) Oral daily  triamcinolone 0.1% Ointment 1 Application(s) Topical two times a day    MEDICATIONS  (PRN):  diphenhydrAMINE 25 milliGRAM(s) Oral every 6 hours PRN Rash and/or Itching  oxyCODONE    IR 5 milliGRAM(s) Oral four times a day PRN Severe Pain (7 - 10)    Vital Signs Last 24 Hrs  T(C): 37.2 (19 Feb 2019 09:47), Max: 37.2 (19 Feb 2019 09:47)  T(F): 98.9 (19 Feb 2019 09:47), Max: 98.9 (19 Feb 2019 09:47)  HR: 87 (19 Feb 2019 09:47) (87 - 97)  BP: 137/72 (19 Feb 2019 09:47) (131/84 - 156/82)  BP(mean): --  RR: 18 (19 Feb 2019 09:47) (18 - 18)  SpO2: 95% (19 Feb 2019 09:47) (95% - 95%)    PHYSICAL EXAM:  GENERAL: NAD, well-developed, tired appearing  HEAD:  Atraumatic, Normocephalic  EYES: EOMI, conjunctiva and sclera clear  NECK: Supple, No JVD  CHEST/LUNG: Mild crackles on R>L, chemoport being used for antibiotics and is c/d/i  HEART: Regular rate and rhythm; No murmurs, rubs, or gallops  ABDOMEN: Soft, Nontender, Nondistended; Bowel sounds present  EXTREMITIES:  2+ Peripheral Pulses, No clubbing, cyanosis, or edema  PSYCH: AAOx3  NEUROLOGY: non-focal  SKIN: No rashes or lesions    LABS:  CBC Full  -  ( 19 Feb 2019 07:21 )  WBC Count : 19.2 K/uL  Hemoglobin : 10.3 g/dL  Hematocrit : 29.7 %  Platelet Count - Automated : 302 K/uL  Mean Cell Volume : 88.1 fl  Mean Cell Hemoglobin : 30.5 pg  Mean Cell Hemoglobin Concentration : 34.6 gm/dL  Auto Neutrophil # : 13.7 K/uL  Auto Lymphocyte # : 2.8 K/uL  Auto Monocyte # : 1.8 K/uL  Auto Eosinophil # : 0.9 K/uL  Auto Basophil # : 0.1 K/uL  Auto Neutrophil % : 73.0 %  Auto Lymphocyte % : 13.0 %  Auto Monocyte % : 9.0 %  Auto Eosinophil % : 2.0 %  Auto Basophil % : 0.0 %    02-19    135  |  99  |  <4<L>  ----------------------------<  80  3.9   |  25  |  0.37<L>    Ca    8.9      19 Feb 2019 07:17  Phos  2.7     02-19  Mg     1.9     02-19    TPro  6.1  /  Alb  2.9<L>  /  TBili  0.5  /  DBili  x   /  AST  47<H>  /  ALT  10  /  AlkPhos  136<H>  02-19    Culture - Blood (collected 16 Feb 2019 10:58)  Source: .Blood Blood-Peripheral  Preliminary Report (17 Feb 2019 11:01):    No growth to date.    Culture - Blood (collected 15 Feb 2019 22:21)  Source: .Blood Blood  Preliminary Report (16 Feb 2019 23:01):    No growth to date.    Culture - Blood (collected 15 Feb 2019 14:34)  Source: .Blood Port Device  Preliminary Report (16 Feb 2019 15:04):    No growth to date.

## 2019-02-19 NOTE — PHYSICAL THERAPY INITIAL EVALUATION ADULT - PRECAUTIONS/LIMITATIONS, REHAB EVAL
ROS + for abdominal pain, which patient states has been ongoing for several months now. In the ED, patient was found to be febrile to Tmax 103F as well as tachycardic.  CT AP:  Mediastinal and bilateral hilar lymphadenopathy. In addition, subcentimeter bilateral retropectoral and axillary lymph nodes are present.Diffuse interlobular septal thickening bilaterally, which may be secondary to congestive heart failure. Heterogeneous mass in the right lobe of the liver, which appears to have increased in size since 1/12/2019.

## 2019-02-19 NOTE — PROGRESS NOTE ADULT - ASSESSMENT
69 yo F with metastatic CA of unknown primary, getting chemo w/ Dr. Scruggs as outpatient, who presents in septic shock, now w/ pressures maintaining after aggressive fluid resuscitation on oral midodrine and blood cultures growing E. Coli. Most likely source is abdomen, CT ab/p w/ out abscess and U/S showing minimal ascites. Will continue to monitor on IV abx and follow workup. Patient continues to improve not requiring oral pressors, with repeat BCxs NGD and downtrending WBC count.

## 2019-02-19 NOTE — PROGRESS NOTE ADULT - PROBLEM SELECTOR PLAN 1
Sepsis on admission, continues to resolve.    - Stable off midodrine.  - will c/w Zosyn Day 4 (2/18).   - no need to tap abdominal ascites.   - Repeat BCx 2/15 NGD. Sepsis on admission, continues to resolve.    - Stable off midodrine.  - On Zosyn Day 5 (2/19); will transition to ertapenem today for total 14 day course.  - no need to tap abdominal ascites.   - Repeat BCx 2/15 NGD.

## 2019-02-20 VITALS
SYSTOLIC BLOOD PRESSURE: 144 MMHG | DIASTOLIC BLOOD PRESSURE: 84 MMHG | TEMPERATURE: 98 F | RESPIRATION RATE: 18 BRPM | HEART RATE: 95 BPM | OXYGEN SATURATION: 95 %

## 2019-02-20 LAB
ANION GAP SERPL CALC-SCNC: 10 MMOL/L — SIGNIFICANT CHANGE UP (ref 5–17)
BUN SERPL-MCNC: <4 MG/DL — LOW (ref 7–23)
CALCIUM SERPL-MCNC: 9.3 MG/DL — SIGNIFICANT CHANGE UP (ref 8.4–10.5)
CHLORIDE SERPL-SCNC: 99 MMOL/L — SIGNIFICANT CHANGE UP (ref 96–108)
CO2 SERPL-SCNC: 26 MMOL/L — SIGNIFICANT CHANGE UP (ref 22–31)
CREAT SERPL-MCNC: 0.35 MG/DL — LOW (ref 0.5–1.3)
CULTURE RESULTS: SIGNIFICANT CHANGE UP
CULTURE RESULTS: SIGNIFICANT CHANGE UP
GLUCOSE SERPL-MCNC: 82 MG/DL — SIGNIFICANT CHANGE UP (ref 70–99)
HCT VFR BLD CALC: 31.4 % — LOW (ref 34.5–45)
HGB BLD-MCNC: 10.6 G/DL — LOW (ref 11.5–15.5)
MAGNESIUM SERPL-MCNC: 1.9 MG/DL — SIGNIFICANT CHANGE UP (ref 1.6–2.6)
MCHC RBC-ENTMCNC: 29.5 PG — SIGNIFICANT CHANGE UP (ref 27–34)
MCHC RBC-ENTMCNC: 33.7 GM/DL — SIGNIFICANT CHANGE UP (ref 32–36)
MCV RBC AUTO: 87.6 FL — SIGNIFICANT CHANGE UP (ref 80–100)
PHOSPHATE SERPL-MCNC: 3.5 MG/DL — SIGNIFICANT CHANGE UP (ref 2.5–4.5)
PLATELET # BLD AUTO: 363 K/UL — SIGNIFICANT CHANGE UP (ref 150–400)
POTASSIUM SERPL-MCNC: 3.9 MMOL/L — SIGNIFICANT CHANGE UP (ref 3.5–5.3)
POTASSIUM SERPL-SCNC: 3.9 MMOL/L — SIGNIFICANT CHANGE UP (ref 3.5–5.3)
RBC # BLD: 3.58 M/UL — LOW (ref 3.8–5.2)
RBC # FLD: 13.3 % — SIGNIFICANT CHANGE UP (ref 10.3–14.5)
SODIUM SERPL-SCNC: 135 MMOL/L — SIGNIFICANT CHANGE UP (ref 135–145)
SPECIMEN SOURCE: SIGNIFICANT CHANGE UP
SPECIMEN SOURCE: SIGNIFICANT CHANGE UP
WBC # BLD: 23.4 K/UL — HIGH (ref 3.8–10.5)
WBC # FLD AUTO: 23.4 K/UL — HIGH (ref 3.8–10.5)

## 2019-02-20 PROCEDURE — 96374 THER/PROPH/DIAG INJ IV PUSH: CPT

## 2019-02-20 PROCEDURE — 82330 ASSAY OF CALCIUM: CPT

## 2019-02-20 PROCEDURE — 97161 PT EVAL LOW COMPLEX 20 MIN: CPT

## 2019-02-20 PROCEDURE — 82533 TOTAL CORTISOL: CPT

## 2019-02-20 PROCEDURE — 76705 ECHO EXAM OF ABDOMEN: CPT

## 2019-02-20 PROCEDURE — 85027 COMPLETE CBC AUTOMATED: CPT

## 2019-02-20 PROCEDURE — 82803 BLOOD GASES ANY COMBINATION: CPT

## 2019-02-20 PROCEDURE — 84443 ASSAY THYROID STIM HORMONE: CPT

## 2019-02-20 PROCEDURE — 82435 ASSAY OF BLOOD CHLORIDE: CPT

## 2019-02-20 PROCEDURE — 83520 IMMUNOASSAY QUANT NOS NONAB: CPT

## 2019-02-20 PROCEDURE — 85730 THROMBOPLASTIN TIME PARTIAL: CPT

## 2019-02-20 PROCEDURE — 83880 ASSAY OF NATRIURETIC PEPTIDE: CPT

## 2019-02-20 PROCEDURE — 82947 ASSAY GLUCOSE BLOOD QUANT: CPT

## 2019-02-20 PROCEDURE — 99239 HOSP IP/OBS DSCHRG MGMT >30: CPT

## 2019-02-20 PROCEDURE — P9045: CPT

## 2019-02-20 PROCEDURE — 83605 ASSAY OF LACTIC ACID: CPT

## 2019-02-20 PROCEDURE — 85014 HEMATOCRIT: CPT

## 2019-02-20 PROCEDURE — 87581 M.PNEUMON DNA AMP PROBE: CPT

## 2019-02-20 PROCEDURE — 84436 ASSAY OF TOTAL THYROXINE: CPT

## 2019-02-20 PROCEDURE — 87633 RESP VIRUS 12-25 TARGETS: CPT

## 2019-02-20 PROCEDURE — 99285 EMERGENCY DEPT VISIT HI MDM: CPT | Mod: 25

## 2019-02-20 PROCEDURE — 71045 X-RAY EXAM CHEST 1 VIEW: CPT

## 2019-02-20 PROCEDURE — 85610 PROTHROMBIN TIME: CPT

## 2019-02-20 PROCEDURE — 83935 ASSAY OF URINE OSMOLALITY: CPT

## 2019-02-20 PROCEDURE — 84295 ASSAY OF SERUM SODIUM: CPT

## 2019-02-20 PROCEDURE — 83930 ASSAY OF BLOOD OSMOLALITY: CPT

## 2019-02-20 PROCEDURE — 87070 CULTURE OTHR SPECIMN AEROBIC: CPT

## 2019-02-20 PROCEDURE — 71250 CT THORAX DX C-: CPT

## 2019-02-20 PROCEDURE — 82570 ASSAY OF URINE CREATININE: CPT

## 2019-02-20 PROCEDURE — 84100 ASSAY OF PHOSPHORUS: CPT

## 2019-02-20 PROCEDURE — 80053 COMPREHEN METABOLIC PANEL: CPT

## 2019-02-20 PROCEDURE — 87150 DNA/RNA AMPLIFIED PROBE: CPT

## 2019-02-20 PROCEDURE — 81001 URINALYSIS AUTO W/SCOPE: CPT

## 2019-02-20 PROCEDURE — 80048 BASIC METABOLIC PNL TOTAL CA: CPT

## 2019-02-20 PROCEDURE — 87040 BLOOD CULTURE FOR BACTERIA: CPT

## 2019-02-20 PROCEDURE — 86803 HEPATITIS C AB TEST: CPT

## 2019-02-20 PROCEDURE — 83735 ASSAY OF MAGNESIUM: CPT

## 2019-02-20 PROCEDURE — 84300 ASSAY OF URINE SODIUM: CPT

## 2019-02-20 PROCEDURE — 96375 TX/PRO/DX INJ NEW DRUG ADDON: CPT

## 2019-02-20 PROCEDURE — 87186 SC STD MICRODIL/AGAR DIL: CPT

## 2019-02-20 PROCEDURE — 93306 TTE W/DOPPLER COMPLETE: CPT

## 2019-02-20 PROCEDURE — 84132 ASSAY OF SERUM POTASSIUM: CPT

## 2019-02-20 PROCEDURE — 74176 CT ABD & PELVIS W/O CONTRAST: CPT

## 2019-02-20 PROCEDURE — 87798 DETECT AGENT NOS DNA AMP: CPT

## 2019-02-20 PROCEDURE — 87486 CHLMYD PNEUM DNA AMP PROBE: CPT

## 2019-02-20 RX ORDER — OXYCODONE HYDROCHLORIDE 5 MG/1
1 TABLET ORAL
Qty: 12 | Refills: 0 | OUTPATIENT
Start: 2019-02-20 | End: 2019-02-22

## 2019-02-20 RX ORDER — POLYETHYLENE GLYCOL 3350 17 G/17G
17 POWDER, FOR SOLUTION ORAL
Qty: 0 | Refills: 0 | COMMUNITY
Start: 2019-02-20

## 2019-02-20 RX ORDER — OXYCODONE HYDROCHLORIDE 5 MG/1
1 TABLET ORAL
Qty: 0 | Refills: 0 | COMMUNITY

## 2019-02-20 RX ORDER — FAMOTIDINE 10 MG/ML
1 INJECTION INTRAVENOUS
Qty: 30 | Refills: 0 | OUTPATIENT
Start: 2019-02-20 | End: 2019-03-21

## 2019-02-20 RX ORDER — ERTAPENEM SODIUM 1 G/1
1 INJECTION, POWDER, LYOPHILIZED, FOR SOLUTION INTRAMUSCULAR; INTRAVENOUS
Qty: 8 | Refills: 0 | OUTPATIENT
Start: 2019-02-20 | End: 2019-02-27

## 2019-02-20 RX ADMIN — ENOXAPARIN SODIUM 40 MILLIGRAM(S): 100 INJECTION SUBCUTANEOUS at 11:31

## 2019-02-20 RX ADMIN — Medication 2.5 MILLIGRAM(S): at 17:16

## 2019-02-20 RX ADMIN — OXYCODONE HYDROCHLORIDE 5 MILLIGRAM(S): 5 TABLET ORAL at 00:50

## 2019-02-20 RX ADMIN — Medication 1 APPLICATION(S): at 17:16

## 2019-02-20 RX ADMIN — Medication 2.5 MILLIGRAM(S): at 05:50

## 2019-02-20 RX ADMIN — OXYCODONE HYDROCHLORIDE 5 MILLIGRAM(S): 5 TABLET ORAL at 01:20

## 2019-02-20 RX ADMIN — POLYETHYLENE GLYCOL 3350 17 GRAM(S): 17 POWDER, FOR SOLUTION ORAL at 11:31

## 2019-02-20 RX ADMIN — Medication 1 APPLICATION(S): at 05:41

## 2019-02-20 RX ADMIN — ERTAPENEM SODIUM 120 MILLIGRAM(S): 1 INJECTION, POWDER, LYOPHILIZED, FOR SOLUTION INTRAMUSCULAR; INTRAVENOUS at 13:54

## 2019-02-20 RX ADMIN — FAMOTIDINE 20 MILLIGRAM(S): 10 INJECTION INTRAVENOUS at 11:31

## 2019-02-20 NOTE — PROGRESS NOTE ADULT - ATTENDING COMMENTS
Pt with intermittent abdominal pain, better with tylenol. Patient has remained hemodynamically stable with cleared BCx since 2/15. Plan to complete total 14 day course of IV abx for E.Coli bacteremia and follow up with Dr. Scruggs to discuss further chemoRx. Patient/ in agreement with dc planning. All questions and concerns were addressed    47 minutes spent on discharge process    Tresa Bates MD  Division of Hospital Medicine  Pager: [phone]  Office: [phone]

## 2019-02-20 NOTE — PROGRESS NOTE ADULT - ASSESSMENT
67 yo F with metastatic CA of unknown primary, getting chemo w/ Dr. Scruggs as outpatient, who presents in septic shock, now w/ pressures maintaining after aggressive fluid resuscitation on oral midodrine and blood cultures growing E. Coli. Most likely source is abdomen, CT ab/p w/ out abscess and U/S showing minimal ascites. Will continue to monitor on IV abx and follow workup. Patient continues to improve not requiring oral pressors, with repeat BCxs NGD and downtrending WBC count. 69 yo F with metastatic CA of unknown primary, getting chemo w/ Dr. Scruggs as outpatient, who presents in septic shock, now w/ pressures maintaining after aggressive fluid resuscitation on oral midodrine and blood cultures growing E. Coli. Most likely source is abdomen, CT ab/p w/ out abscess and U/S showing minimal ascites. Will continue to monitor on IV abx and follow workup. Patient continues to improve; she's off oral pressors, with repeat BCxs NGD. WBC count uptrending today.

## 2019-02-20 NOTE — CHART NOTE - NSCHARTNOTEFT_GEN_A_CORE
ISTOP Note    Patient's iSTOP was verified; reference number was [id number].    Jaya Toussaint MD  Internal Medicine PGY1

## 2019-02-20 NOTE — PROGRESS NOTE ADULT - PROBLEM SELECTOR PLAN 1
Sepsis on admission, continues to resolve.    - Stable off midodrine.  - On Zosyn Day 5 (2/19); will transition to ertapenem today for total 14 day course.  - no need to tap abdominal ascites.   - Repeat BCx 2/15 NGD. Sepsis on admission, continues to resolve. Of note, WBC count increasing.    - Stable off midodrine.  - On ertapenem (until 2/28)  - no need to tap abdominal ascites.   - Repeat BCx 2/15 NGD.

## 2019-02-20 NOTE — PROGRESS NOTE ADULT - PROBLEM SELECTOR PLAN 3
-appreciate onc [facility]. -appreciate onc recs. Will inform about new, but minimal vaginal bleeding.

## 2019-02-20 NOTE — PROGRESS NOTE ADULT - PROBLEM SELECTOR PLAN 2
E coli bacteremia. Transition to ertapenem.    - Repeat BCx 2/15 NGD.   - source likely GI. E coli bacteremia. Transitioned to ertapenem.    - Repeat BCx 2/15 NGD.   - source likely GI.

## 2019-02-20 NOTE — PROGRESS NOTE ADULT - SUBJECTIVE AND OBJECTIVE BOX
Patient is a 68y old  Female who presents with a chief complaint of Fever (16 Feb 2019 15:50)    SUBJECTIVE / OVERNIGHT EVENTS:    MEDICATIONS  (STANDING):      Vital Signs Last 24 Hrs      PHYSICAL EXAM:  GENERAL: NAD, well-developed, tired appearing    CHEST/LUNG: Mild crackles on R>L, chemoport being used for antibiotics and is c/d/i      LABS:      Culture - Blood (collected 16 Feb 2019 10:58)  Source: .Blood Blood-Peripheral  Preliminary Report (17 Feb 2019 11:01):    No growth to date.    Culture - Blood (collected 15 Feb 2019 22:21)  Source: .Blood Blood  Preliminary Report (16 Feb 2019 23:01):    No growth to date.    Culture - Blood (collected 15 Feb 2019 14:34)  Source: .Blood Port Device  Preliminary Report (16 Feb 2019 15:04):    No growth to date. Patient is a 68y old  Female who presents with a chief complaint of Fever (16 Feb 2019 15:50)    SUBJECTIVE / OVERNIGHT EVENTS: Patient slightly hypertensive to 160s SBP. Reporting minimal (few drops) of post-menopausal vaginal bleeding, which is new. Abdominal pain is improving.    MEDICATIONS  (STANDING):  dronabinol 2.5 milliGRAM(s) Oral two times a day  enoxaparin Injectable 40 milliGRAM(s) SubCutaneous daily  ertapenem  IVPB      ertapenem  IVPB 1000 milliGRAM(s) IV Intermittent every 24 hours  famotidine    Tablet 20 milliGRAM(s) Oral daily  polyethylene glycol 3350 17 Gram(s) Oral daily  triamcinolone 0.1% Ointment 1 Application(s) Topical two times a day    MEDICATIONS  (PRN):  diphenhydrAMINE 25 milliGRAM(s) Oral every 6 hours PRN Rash and/or Itching  oxyCODONE    IR 5 milliGRAM(s) Oral four times a day PRN Severe Pain (7 - 10)    Vital Signs Last 24 Hrs  T(C): 37 (20 Feb 2019 08:45), Max: 37.3 (19 Feb 2019 20:32)  T(F): 98.6 (20 Feb 2019 08:45), Max: 99.1 (19 Feb 2019 20:32)  HR: 94 (20 Feb 2019 08:45) (87 - 96)  BP: 147/85 (20 Feb 2019 08:45) (146/78 - 161/83)  BP(mean): --  RR: 17 (20 Feb 2019 08:45) (17 - 20)  SpO2: 96% (20 Feb 2019 04:32) (95% - 96%)    PHYSICAL EXAM:  GENERAL: NAD, well-developed, tired appearing  HEAD:  Atraumatic, Normocephalic  EYES: EOMI, conjunctiva and sclera clear  NECK: Supple, No JVD  CHEST/LUNG: Clear to auscultation bilaterally. No wheeze, ronchi or rales. Chemoport being used for antibiotics and is c/d/i  HEART: Regular rate and rhythm; No murmurs, rubs, or gallops  ABDOMEN: Soft, Nontender, Nondistended; Bowel sounds present  EXTREMITIES:  2+ Peripheral Pulses, No clubbing, cyanosis, or edema  PSYCH: AAOx3  NEUROLOGY: non-focal  SKIN: No rashes or lesions    LABS:  CBC Full  -  ( 20 Feb 2019 06:28 )  WBC Count : 23.4 K/uL  Hemoglobin : 10.6 g/dL  Hematocrit : 31.4 %  Platelet Count - Automated : 363 K/uL  Mean Cell Volume : 87.6 fl  Mean Cell Hemoglobin : 29.5 pg  Mean Cell Hemoglobin Concentration : 33.7 gm/dL  Auto Neutrophil # : x  Auto Lymphocyte # : x  Auto Monocyte # : x  Auto Eosinophil # : x  Auto Basophil # : x  Auto Neutrophil % : x  Auto Lymphocyte % : x  Auto Monocyte % : x  Auto Eosinophil % : x  Auto Basophil % : x    02-20    135  |  99  |  <4<L>  ----------------------------<  82  3.9   |  26  |  0.35<L>    Ca    9.3      20 Feb 2019 06:28  Phos  3.5     02-20  Mg     1.9     02-20    TPro  6.1  /  Alb  2.9<L>  /  TBili  0.5  /  DBili  x   /  AST  47<H>  /  ALT  10  /  AlkPhos  136<H>  02-19      Culture - Blood (collected 16 Feb 2019 10:58)  Source: .Blood Blood-Peripheral  Preliminary Report (17 Feb 2019 11:01):    No growth to date.    Culture - Blood (collected 15 Feb 2019 22:21)  Source: .Blood Blood  Preliminary Report (16 Feb 2019 23:01):    No growth to date.    Culture - Blood (collected 15 Feb 2019 14:34)  Source: .Blood Port Device  Preliminary Report (16 Feb 2019 15:04):    No growth to date.

## 2019-02-21 LAB
CULTURE RESULTS: SIGNIFICANT CHANGE UP
SPECIMEN SOURCE: SIGNIFICANT CHANGE UP

## 2019-02-21 NOTE — DISCUSSION/SUMMARY
[Home] : patient was discharged to home [FreeTextEntry1] : Spoke to patient and patient  about recent hospitalization, states she is doing better, no fevers. Temperature this morning was 98.2. Patient was discharged with invanz abx infusions for 7 days, last day will be 2/28/19. They will be following up with Dr. Scruggs on Monday since she is on vacation this week, to coordinate follow up plan and future chemotherapy. They scheduled a follow up with pcp 2/27/19 and will call back sooner as needed.

## 2019-02-22 LAB
CULTURE RESULTS: SIGNIFICANT CHANGE UP
SPECIMEN SOURCE: SIGNIFICANT CHANGE UP

## 2019-02-25 ENCOUNTER — APPOINTMENT (OUTPATIENT)
Dept: HEMATOLOGY ONCOLOGY | Facility: CLINIC | Age: 69
End: 2019-02-25
Payer: COMMERCIAL

## 2019-02-25 VITALS
SYSTOLIC BLOOD PRESSURE: 146 MMHG | BODY MASS INDEX: 28.05 KG/M2 | TEMPERATURE: 99.1 F | OXYGEN SATURATION: 100 % | WEIGHT: 138.89 LBS | HEART RATE: 79 BPM | RESPIRATION RATE: 18 BRPM | DIASTOLIC BLOOD PRESSURE: 83 MMHG

## 2019-02-25 DIAGNOSIS — R21 RASH AND OTHER NONSPECIFIC SKIN ERUPTION: ICD-10-CM

## 2019-02-25 DIAGNOSIS — R43.2 PARAGEUSIA: ICD-10-CM

## 2019-02-25 DIAGNOSIS — Z87.2 PERSONAL HISTORY OF DISEASES OF THE SKIN AND SUBCUTANEOUS TISSUE: ICD-10-CM

## 2019-02-25 PROCEDURE — 99214 OFFICE O/P EST MOD 30 MIN: CPT

## 2019-02-25 NOTE — HISTORY OF PRESENT ILLNESS
[Disease: _____________________] : Disease: [unfilled] [AJCC Stage: ____] : AJCC Stage: [unfilled] [Therapy: ___] : Therapy: [unfilled] [Cycle: ___] : Cycle: [unfilled] [Day: ___] : Day: [unfilled] [de-identified] : Mrs Neri is a 68 year old female with no significant past medical history presenting to the office for an initial consultation carcinoma of unknown primary.  She was in [place] from Nov - Dec 2018 and while there noted ongoing weight loss. Lost total of 35 lbs in 3-4 mos. This was a/w decrease in appetite and fatigue. Attributed some of the weight loss to exercising. When she returned on Dec 10th, she developed fevers Tmax 103F and abdominal pain. She was evaluated by her PCP who noted a WBC 19 and initiated an infectious work up and referral to ID. ID requested a CT A/P which was performed on 1/12/19. This showed numerous hypodense hepatic lesions, largest is 11 cm, R adnexal dermoid in the left uterine body. Both infectious and neoplastic etiologies possible. Concern was for liver abscess and pt was directed to the [facility] ER. Admitted to [facility] 1/15-1/18/19. Noted to have persistent leukocytosis. Infectious work up negative. Underwent biopsy of liver lesion which was c/w malignancy. Referred to medical oncology. \par \par 2/5/19 PET/CT: no primary is elucidated on this study: pericardial implant, upper RP LN, multiple hepatic mets\par 2/11/19: C1 FOLFOX (20% dose reduction due to frailty)\par 2/14-2/20/19: admitted with GNR sepsis\par \par \par \par \par  [de-identified] : poorly differentiated  [de-identified] : CA 19-9 1122 AFP 1.6 [de-identified] : CK19, CK7(focal): positive\par HEPAR, TTF1, LINA-3, CK20, PAX-8, ER, ARGINASE, SYNAPTOPHYSIN,\par CHROMOGRANIN, CD56, INHIBIN: all are negative.\par Ki67: aproaximally 20-25%\par  [de-identified] : Humble presents for f/u accompanied by her . She was hospitalized for a week at [facility] with E coli bacteremia with sepsis, resistant to Levaquin and Cipro. CT CAP was done without source identified but POD noted. She was d/c on IV ertaperem till 2/28/19. \par Gained 8 lbs since last visit. \par Minimal abdominal pain now. Has not taken any oxycodone. \par c/o dysgeusia

## 2019-02-25 NOTE — CONSULT LETTER
[Dear  ___] : Dear  [unfilled], [Courtesy Letter:] : I had the pleasure of seeing your patient, [unfilled], in my office today. [Please see my note below.] : Please see my note below. [Consult Closing:] : Thank you very much for allowing me to participate in the care of this patient.  If you have any questions, please do not hesitate to contact me. [Sincerely,] : Sincerely, [FreeTextEntry3] : Lanny Scruggs D.O. \par Attending Physician \par [facility] of Medical Oncology and Hematology\par  \par [facility] \par Tel: [phone]\par Fax: [phone]\par

## 2019-02-26 ENCOUNTER — OUTPATIENT (OUTPATIENT)
Dept: OUTPATIENT SERVICES | Facility: HOSPITAL | Age: 69
LOS: 1 days | Discharge: HOME | End: 2019-02-26

## 2019-02-26 DIAGNOSIS — C22.9 MALIGNANT NEOPLASM OF LIVER, NOT SPECIFIED AS PRIMARY OR SECONDARY: ICD-10-CM

## 2019-02-27 ENCOUNTER — APPOINTMENT (OUTPATIENT)
Dept: INTERNAL MEDICINE | Facility: CLINIC | Age: 69
End: 2019-02-27
Payer: COMMERCIAL

## 2019-02-27 VITALS
BODY MASS INDEX: 26.81 KG/M2 | OXYGEN SATURATION: 96 % | HEART RATE: 94 BPM | DIASTOLIC BLOOD PRESSURE: 64 MMHG | HEIGHT: 59 IN | WEIGHT: 133 LBS | SYSTOLIC BLOOD PRESSURE: 124 MMHG | TEMPERATURE: 98.6 F

## 2019-02-27 DIAGNOSIS — Z87.898 PERSONAL HISTORY OF OTHER SPECIFIED CONDITIONS: ICD-10-CM

## 2019-02-27 PROCEDURE — 99214 OFFICE O/P EST MOD 30 MIN: CPT

## 2019-02-27 NOTE — ASSESSMENT
[FreeTextEntry1] : pt with st 4 cancer recent diagnosis on chemo\par has hcp\par discussed wishes and prognosis in general terms \par will fu with oncologist

## 2019-02-27 NOTE — HISTORY OF PRESENT ILLNESS
[FreeTextEntry1] : FU cancer diagnosis [de-identified] : pt seen with  recent cancer diagnosis ? liver cancer st 4 vs cup - started chemo folfox - had episode sepsis now on ertapenem at home - has chemo again next monday.\par Pt appears comfortable; denies pain; meds reviewed; states taking dronabinol but gives her bad taste in mouth does not want to eat.\par \par Pt and  had many questions.\par They seem unclear about prognosis and likelyhood of cure.\par Will discuss with oncologist.\par discussed second opinion; encouraged to do so but also to persist in chemo told pt radical change in plan not likely.\par Will review path not able to access reports.\par Pt may qualify for medical marijuana.

## 2019-02-27 NOTE — PHYSICAL EXAM
[No Acute Distress] : no acute distress [Normal Sclera/Conjunctiva] : normal sclera/conjunctiva [Normal Oropharynx] : the oropharynx was normal [Clear to Auscultation] : lungs were clear to auscultation bilaterally [Regular Rhythm] : with a regular rhythm [Normal S1, S2] : normal S1 and S2 [Soft] : abdomen soft [Normal Bowel Sounds] : normal bowel sounds

## 2019-03-04 ENCOUNTER — RESULT REVIEW (OUTPATIENT)
Age: 69
End: 2019-03-04

## 2019-03-04 ENCOUNTER — LABORATORY RESULT (OUTPATIENT)
Age: 69
End: 2019-03-04

## 2019-03-04 ENCOUNTER — APPOINTMENT (OUTPATIENT)
Age: 69
End: 2019-03-04

## 2019-03-04 LAB
BASOPHILS # BLD AUTO: 0 K/UL — SIGNIFICANT CHANGE UP (ref 0–0.2)
BASOPHILS NFR BLD AUTO: 0.2 % — SIGNIFICANT CHANGE UP (ref 0–2)
EOSINOPHIL # BLD AUTO: 0.4 K/UL — SIGNIFICANT CHANGE UP (ref 0–0.5)
EOSINOPHIL NFR BLD AUTO: 2.1 % — SIGNIFICANT CHANGE UP (ref 0–6)
HCT VFR BLD CALC: 34.2 % — LOW (ref 34.5–45)
HGB BLD-MCNC: 11.6 G/DL — SIGNIFICANT CHANGE UP (ref 11.5–15.5)
LYMPHOCYTES # BLD AUTO: 17.5 % — SIGNIFICANT CHANGE UP (ref 13–44)
LYMPHOCYTES # BLD AUTO: 3.3 K/UL — SIGNIFICANT CHANGE UP (ref 1–3.3)
MCHC RBC-ENTMCNC: 29.5 PG — SIGNIFICANT CHANGE UP (ref 27–34)
MCHC RBC-ENTMCNC: 33.8 G/DL — SIGNIFICANT CHANGE UP (ref 32–36)
MCV RBC AUTO: 87.4 FL — SIGNIFICANT CHANGE UP (ref 80–100)
MONOCYTES # BLD AUTO: 2.2 K/UL — HIGH (ref 0–0.9)
MONOCYTES NFR BLD AUTO: 11.6 % — SIGNIFICANT CHANGE UP (ref 2–14)
NEUTROPHILS # BLD AUTO: 12.9 K/UL — HIGH (ref 1.8–7.4)
NEUTROPHILS NFR BLD AUTO: 68.6 % — SIGNIFICANT CHANGE UP (ref 43–77)
PLATELET # BLD AUTO: 333 K/UL — SIGNIFICANT CHANGE UP (ref 150–400)
RBC # BLD: 3.92 M/UL — SIGNIFICANT CHANGE UP (ref 3.8–5.2)
RBC # FLD: 13.1 % — SIGNIFICANT CHANGE UP (ref 10.3–14.5)
WBC # BLD: 18.8 K/UL — HIGH (ref 3.8–10.5)
WBC # FLD AUTO: 18.8 K/UL — HIGH (ref 3.8–10.5)

## 2019-03-05 DIAGNOSIS — R11.2 NAUSEA WITH VOMITING, UNSPECIFIED: ICD-10-CM

## 2019-03-05 DIAGNOSIS — Z51.11 ENCOUNTER FOR ANTINEOPLASTIC CHEMOTHERAPY: ICD-10-CM

## 2019-03-06 ENCOUNTER — APPOINTMENT (OUTPATIENT)
Age: 69
End: 2019-03-06

## 2019-03-13 ENCOUNTER — FORM ENCOUNTER (OUTPATIENT)
Age: 69
End: 2019-03-13

## 2019-03-13 ENCOUNTER — APPOINTMENT (OUTPATIENT)
Age: 69
End: 2019-03-13
Payer: COMMERCIAL

## 2019-03-13 VITALS
RESPIRATION RATE: 14 BRPM | OXYGEN SATURATION: 98 % | WEIGHT: 130.07 LBS | BODY MASS INDEX: 26.27 KG/M2 | TEMPERATURE: 97.3 F | DIASTOLIC BLOOD PRESSURE: 74 MMHG | HEART RATE: 112 BPM | SYSTOLIC BLOOD PRESSURE: 122 MMHG

## 2019-03-13 PROCEDURE — 99213 OFFICE O/P EST LOW 20 MIN: CPT

## 2019-03-13 RX ORDER — BENZONATATE 100 MG/1
100 CAPSULE ORAL 3 TIMES DAILY
Qty: 45 | Refills: 1 | Status: DISCONTINUED | COMMUNITY
Start: 2017-10-03 | End: 2019-03-13

## 2019-03-13 RX ORDER — DRONABINOL 2.5 MG/1
2.5 CAPSULE ORAL
Qty: 60 | Refills: 0 | Status: ACTIVE | COMMUNITY
Start: 2019-01-31 | End: 1900-01-01

## 2019-03-13 RX ORDER — ERTAPENEM SODIUM 1 G/1
1 INJECTION, POWDER, LYOPHILIZED, FOR SOLUTION INTRAMUSCULAR; INTRAVENOUS
Refills: 0 | Status: DISCONTINUED | COMMUNITY
Start: 2019-02-25 | End: 2019-03-13

## 2019-03-13 RX ORDER — OXYCODONE 5 MG/1
5 TABLET ORAL
Qty: 60 | Refills: 0 | Status: ACTIVE | COMMUNITY
Start: 2019-01-30 | End: 1900-01-01

## 2019-03-13 RX ORDER — FLUTICASONE PROPIONATE 50 UG/1
50 SPRAY, METERED NASAL DAILY
Qty: 1 | Refills: 3 | Status: DISCONTINUED | COMMUNITY
Start: 2017-10-03 | End: 2019-03-13

## 2019-03-13 RX ORDER — FEXOFENADINE HCL 180 MG/1
180 TABLET, FILM COATED ORAL
Qty: 30 | Refills: 0 | Status: DISCONTINUED | COMMUNITY
Start: 2017-10-03 | End: 2019-03-13

## 2019-03-14 ENCOUNTER — APPOINTMENT (OUTPATIENT)
Age: 69
End: 2019-03-14

## 2019-03-14 ENCOUNTER — APPOINTMENT (OUTPATIENT)
Dept: ULTRASOUND IMAGING | Facility: IMAGING CENTER | Age: 69
End: 2019-03-14
Payer: COMMERCIAL

## 2019-03-14 ENCOUNTER — OUTPATIENT (OUTPATIENT)
Dept: OUTPATIENT SERVICES | Facility: HOSPITAL | Age: 69
LOS: 1 days | End: 2019-03-14
Payer: COMMERCIAL

## 2019-03-14 DIAGNOSIS — C80.1 MALIGNANT (PRIMARY) NEOPLASM, UNSPECIFIED: ICD-10-CM

## 2019-03-14 PROCEDURE — 76705 ECHO EXAM OF ABDOMEN: CPT | Mod: 26

## 2019-03-14 PROCEDURE — 76705 ECHO EXAM OF ABDOMEN: CPT

## 2019-03-15 DIAGNOSIS — E86.0 DEHYDRATION: ICD-10-CM

## 2019-03-18 ENCOUNTER — RESULT REVIEW (OUTPATIENT)
Age: 69
End: 2019-03-18

## 2019-03-18 ENCOUNTER — LABORATORY RESULT (OUTPATIENT)
Age: 69
End: 2019-03-18

## 2019-03-18 ENCOUNTER — APPOINTMENT (OUTPATIENT)
Age: 69
End: 2019-03-18

## 2019-03-18 LAB
BASOPHILS # BLD AUTO: 0.1 K/UL — SIGNIFICANT CHANGE UP (ref 0–0.2)
BASOPHILS NFR BLD AUTO: 0.4 % — SIGNIFICANT CHANGE UP (ref 0–2)
EOSINOPHIL # BLD AUTO: 0.2 K/UL — SIGNIFICANT CHANGE UP (ref 0–0.5)
EOSINOPHIL NFR BLD AUTO: 1.3 % — SIGNIFICANT CHANGE UP (ref 0–6)
HCT VFR BLD CALC: 31.3 % — LOW (ref 34.5–45)
HGB BLD-MCNC: 10.6 G/DL — LOW (ref 11.5–15.5)
LYMPHOCYTES # BLD AUTO: 14.5 % — SIGNIFICANT CHANGE UP (ref 13–44)
LYMPHOCYTES # BLD AUTO: 2.4 K/UL — SIGNIFICANT CHANGE UP (ref 1–3.3)
MCHC RBC-ENTMCNC: 29.2 PG — SIGNIFICANT CHANGE UP (ref 27–34)
MCHC RBC-ENTMCNC: 33.9 G/DL — SIGNIFICANT CHANGE UP (ref 32–36)
MCV RBC AUTO: 86.1 FL — SIGNIFICANT CHANGE UP (ref 80–100)
MONOCYTES # BLD AUTO: 2.2 K/UL — HIGH (ref 0–0.9)
MONOCYTES NFR BLD AUTO: 13.3 % — SIGNIFICANT CHANGE UP (ref 2–14)
NEUTROPHILS # BLD AUTO: 11.8 K/UL — HIGH (ref 1.8–7.4)
NEUTROPHILS NFR BLD AUTO: 70.5 % — SIGNIFICANT CHANGE UP (ref 43–77)
PLATELET # BLD AUTO: 288 K/UL — SIGNIFICANT CHANGE UP (ref 150–400)
RBC # BLD: 3.64 M/UL — LOW (ref 3.8–5.2)
RBC # FLD: 14.2 % — SIGNIFICANT CHANGE UP (ref 10.3–14.5)
WBC # BLD: 16.8 K/UL — HIGH (ref 3.8–10.5)
WBC # FLD AUTO: 16.8 K/UL — HIGH (ref 3.8–10.5)

## 2019-03-20 ENCOUNTER — APPOINTMENT (OUTPATIENT)
Age: 69
End: 2019-03-20

## 2019-03-25 ENCOUNTER — OUTPATIENT (OUTPATIENT)
Dept: OUTPATIENT SERVICES | Facility: HOSPITAL | Age: 69
LOS: 1 days | Discharge: HOME | End: 2019-03-25

## 2019-03-25 DIAGNOSIS — C22.9 MALIGNANT NEOPLASM OF LIVER, NOT SPECIFIED AS PRIMARY OR SECONDARY: ICD-10-CM

## 2019-03-27 ENCOUNTER — APPOINTMENT (OUTPATIENT)
Dept: HEMATOLOGY ONCOLOGY | Facility: CLINIC | Age: 69
End: 2019-03-27
Payer: COMMERCIAL

## 2019-03-27 ENCOUNTER — APPOINTMENT (OUTPATIENT)
Dept: DERMATOLOGY | Facility: CLINIC | Age: 69
End: 2019-03-27

## 2019-03-27 VITALS
WEIGHT: 128.97 LBS | RESPIRATION RATE: 16 BRPM | HEART RATE: 110 BPM | SYSTOLIC BLOOD PRESSURE: 136 MMHG | DIASTOLIC BLOOD PRESSURE: 84 MMHG | BODY MASS INDEX: 26.05 KG/M2 | TEMPERATURE: 98.1 F | OXYGEN SATURATION: 96 %

## 2019-03-27 PROCEDURE — 99214 OFFICE O/P EST MOD 30 MIN: CPT

## 2019-03-27 NOTE — HISTORY OF PRESENT ILLNESS
[Disease: _____________________] : Disease: [unfilled] [AJCC Stage: ____] : AJCC Stage: [unfilled] [Therapy: ___] : Therapy: [unfilled] [Cycle: ___] : Cycle: [unfilled] [Day: ___] : Day: [unfilled] [de-identified] : Mrs Neri is a 68 year old female with no significant past medical history presenting to the office for an initial consultation carcinoma of unknown primary.  She was in [place] from Nov - Dec 2018 and while there noted ongoing weight loss. Lost total of 35 lbs in 3-4 mos. This was a/w decrease in appetite and fatigue. Attributed some of the weight loss to exercising. When she returned on Dec 10th, she developed fevers Tmax 103F and abdominal pain. She was evaluated by her PCP who noted a WBC 19 and initiated an infectious work up and referral to ID. ID requested a CT A/P which was performed on 1/12/19. This showed numerous hypodense hepatic lesions, largest is 11 cm, R adnexal dermoid in the left uterine body. Both infectious and neoplastic etiologies possible. Concern was for liver abscess and pt was [facility]. Ad[facility] 1[facility]15-1/18/19. Noted to have persistent leukocytosis. Infectious work up negative. Underwent biopsy of liver lesion which was c/w malignancy. Referred to medical oncology. \par \par 2/5/19 PET/CT: no primary is elucidated on this study: pericardial implant, upper RP LN, multiple hepatic mets\par 2/11/19: C1 FOLFOX (20% dose reduction due to frailty)\par 2/14-2/20/19: admitted with GNR sepsis\par \par \par \par \par  [de-identified] : poorly differentiated  [de-identified] : CA 19-9 1122 AFP 1.6 [de-identified] : CK19, CK7(focal): positive\par HEPAR, TTF1, LINA-3, CK20, PAX-8, ER, ARGINASE, SYNAPTOPHYSIN,\par CHROMOGRANIN, CD56, INHIBIN: all are negative.\par Ki67: aproaximally 20-25%\par  [de-identified] : Humble presents for f/u accompanied by her . She is overall feeling ok. Pain has improved overall. + constipation, does not take any stool softeners. \par No n/v. No significant neuropathy. Appetite is ok. Weight is relatively stable. No further fevers. \par abdominal rash has resolved. \par + abdominal distention

## 2019-03-27 NOTE — REVIEW OF SYSTEMS
[Fatigue] : fatigue [Negative] : Allergic/Immunologic [Recent Change In Weight] : ~T no recent weight change

## 2019-03-27 NOTE — CONSULT LETTER
[Dear  ___] : Dear  [unfilled], [Courtesy Letter:] : I had the pleasure of seeing your patient, [unfilled], in my office today. [Please see my note below.] : Please see my note below. [Consult Closing:] : Thank you very much for allowing me to participate in the care of this patient.  If you have any questions, please do not hesitate to contact me. [Sincerely,] : Sincerely, [FreeTextEntry3] : Lanny Scruggs D.O. \par Attending Physician \par [facility] of Medical Oncology and Hematology\par  \par [facility] \par Tel: [phone]\par Fax: [phone]\par

## 2019-03-28 ENCOUNTER — APPOINTMENT (OUTPATIENT)
Age: 69
End: 2019-03-28

## 2019-03-28 RX ORDER — DRONABINOL 2.5 MG
1 CAPSULE ORAL
Qty: 0 | Refills: 0 | COMMUNITY

## 2019-03-28 RX ORDER — CETIRIZINE HYDROCHLORIDE 10 MG/1
1 TABLET ORAL
Qty: 0 | Refills: 0 | COMMUNITY

## 2019-03-28 NOTE — REVIEW OF SYSTEMS
[Fatigue] : fatigue [Abdominal Pain] : abdominal pain [Constipation] : constipation [Diarrhea] : diarrhea [Negative] : Allergic/Immunologic

## 2019-03-28 NOTE — HISTORY OF PRESENT ILLNESS
[Disease: _____________________] : Disease: [unfilled] [AJCC Stage: ____] : AJCC Stage: [unfilled] [Therapy: ___] : Therapy: [unfilled] [Cycle: ___] : Cycle: [unfilled] [Day: ___] : Day: [unfilled] [de-identified] : Mrs Neri is a 68 year old female with no significant past medical history presenting to the office for an initial consultation carcinoma of unknown primary.  She was in [place] from Nov - Dec 2018 and while there noted ongoing weight loss. Lost total of 35 lbs in 3-4 mos. This was a/w decrease in appetite and fatigue. Attributed some of the weight loss to exercising. When she returned on Dec 10th, she developed fevers Tmax 103F and abdominal pain. She was evaluated by her PCP who noted a WBC 19 and initiated an infectious work up and referral to ID. ID requested a CT A/P which was performed on 1/12/19. This showed numerous hypodense hepatic lesions, largest is 11 cm, R adnexal dermoid in the left uterine body. Both infectious and neoplastic etiologies possible. Concern was for liver abscess and pt was directed to the [facility] ER. Admitted to [facility] 1/15-1/18/19. Noted to have persistent leukocytosis. Infectious work up negative. Underwent biopsy of liver lesion which was c/w malignancy. Referred to medical oncology. \par \par 2/5/19 PET/CT: no primary is elucidated on this study: pericardial implant, upper RP LN, multiple hepatic mets\par 2/11/19: C1 FOLFOX (20% dose reduction due to frailty)\par 2/14-2/20/19: admitted with GNR sepsis\par 3/4/19: C2\par 3/18/19: C3\par \par \par \par \par \par  [de-identified] : poorly differentiated  [de-identified] : CA 19-9 1122 AFP 1.6 [de-identified] : CK19, CK7(focal): positive\par HEPAR, TTF1, LINA-3, CK20, PAX-8, ER, ARGINASE, SYNAPTOPHYSIN,\par CHROMOGRANIN, CD56, INHIBIN: all are negative.\par Ki67: aproaximally 20-25%\par  [de-identified] : [place] presents for f/u accompanied by . After last cycle of FOLFOX, developed abdominal pain, constipation x 5 days which was relieved with stool softener and Miralax. Then developed diarrhea for 3 days, took pepto which helped. Then noticed stool was black. \par Has been having intermittent epistaxis, which resolves on its own. \par Had temp 100.4 yesterday which improved with tylenol. No other symptoms of infection. \par Noticed a hyperpigmented rash on inner thighs, not pruritic.

## 2019-03-28 NOTE — CONSULT LETTER
[Dear  ___] : Dear  [unfilled], [Courtesy Letter:] : I had the pleasure of seeing your patient, [unfilled], in my office today. [Please see my note below.] : Please see my note below. [Consult Closing:] : Thank you very much for allowing me to participate in the care of this patient.  If you have any questions, please do not hesitate to contact me. [Sincerely,] : Sincerely, [FreeTextEntry3] : Lanny Scruggs D.O. \par Attending Physician \par [facility] of [facility] Oncology and Hematology\par  \par [facility] \par Tel: [phone]\par Fax: [phone]\par

## 2019-03-29 DIAGNOSIS — E86.0 DEHYDRATION: ICD-10-CM

## 2019-04-01 ENCOUNTER — RESULT REVIEW (OUTPATIENT)
Age: 69
End: 2019-04-01

## 2019-04-01 ENCOUNTER — LABORATORY RESULT (OUTPATIENT)
Age: 69
End: 2019-04-01

## 2019-04-01 ENCOUNTER — APPOINTMENT (OUTPATIENT)
Age: 69
End: 2019-04-01

## 2019-04-01 LAB
BASOPHILS # BLD AUTO: 0.2 K/UL — SIGNIFICANT CHANGE UP (ref 0–0.2)
BASOPHILS NFR BLD AUTO: 0.8 % — SIGNIFICANT CHANGE UP (ref 0–2)
EOSINOPHIL # BLD AUTO: 0 K/UL — SIGNIFICANT CHANGE UP (ref 0–0.5)
EOSINOPHIL NFR BLD AUTO: 0 % — SIGNIFICANT CHANGE UP (ref 0–6)
HCT VFR BLD CALC: 34.1 % — LOW (ref 34.5–45)
HGB BLD-MCNC: 12.2 G/DL — SIGNIFICANT CHANGE UP (ref 11.5–15.5)
LYMPHOCYTES # BLD AUTO: 14.1 % — SIGNIFICANT CHANGE UP (ref 13–44)
LYMPHOCYTES # BLD AUTO: 2.6 K/UL — SIGNIFICANT CHANGE UP (ref 1–3.3)
MCHC RBC-ENTMCNC: 30.9 PG — SIGNIFICANT CHANGE UP (ref 27–34)
MCHC RBC-ENTMCNC: 35.8 G/DL — SIGNIFICANT CHANGE UP (ref 32–36)
MCV RBC AUTO: 86.3 FL — SIGNIFICANT CHANGE UP (ref 80–100)
MONOCYTES # BLD AUTO: 2.4 K/UL — HIGH (ref 0–0.9)
MONOCYTES NFR BLD AUTO: 13.6 % — SIGNIFICANT CHANGE UP (ref 2–14)
NEUTROPHILS # BLD AUTO: 12.9 K/UL — HIGH (ref 1.8–7.4)
NEUTROPHILS NFR BLD AUTO: 71.4 % — SIGNIFICANT CHANGE UP (ref 43–77)
PLATELET # BLD AUTO: 226 K/UL — SIGNIFICANT CHANGE UP (ref 150–400)
RBC # BLD: 3.95 M/UL — SIGNIFICANT CHANGE UP (ref 3.8–5.2)
RBC # FLD: 15.2 % — HIGH (ref 10.3–14.5)
WBC # BLD: 18 K/UL — HIGH (ref 3.8–10.5)
WBC # FLD AUTO: 18 K/UL — HIGH (ref 3.8–10.5)

## 2019-04-02 ENCOUNTER — RESULT REVIEW (OUTPATIENT)
Age: 69
End: 2019-04-02

## 2019-04-02 ENCOUNTER — OUTPATIENT (OUTPATIENT)
Dept: OUTPATIENT SERVICES | Facility: HOSPITAL | Age: 69
LOS: 1 days | End: 2019-04-02

## 2019-04-02 ENCOUNTER — APPOINTMENT (OUTPATIENT)
Dept: HEMATOLOGY ONCOLOGY | Facility: CLINIC | Age: 69
End: 2019-04-02

## 2019-04-02 DIAGNOSIS — R11.2 NAUSEA WITH VOMITING, UNSPECIFIED: ICD-10-CM

## 2019-04-02 DIAGNOSIS — E87.1 HYPO-OSMOLALITY AND HYPONATREMIA: ICD-10-CM

## 2019-04-02 DIAGNOSIS — Z51.11 ENCOUNTER FOR ANTINEOPLASTIC CHEMOTHERAPY: ICD-10-CM

## 2019-04-02 LAB
BASOPHILS # BLD AUTO: 0 K/UL — SIGNIFICANT CHANGE UP (ref 0–0.2)
BASOPHILS NFR BLD AUTO: 0.3 % — SIGNIFICANT CHANGE UP (ref 0–2)
BUN SERPL-MCNC: 10 MG/DL — SIGNIFICANT CHANGE UP (ref 7–23)
CA-I BLDA-SCNC: 1.13 MMOL/L — SIGNIFICANT CHANGE UP (ref 1.12–1.3)
CHLORIDE SERPL-SCNC: 96 MMOL/L — SIGNIFICANT CHANGE UP (ref 96–108)
CO2 SERPL-SCNC: 22 MMOL/L — SIGNIFICANT CHANGE UP (ref 22–31)
CREAT SERPL-MCNC: 0.4 MG/DL — LOW (ref 0.5–1.3)
EOSINOPHIL # BLD AUTO: 0 K/UL — SIGNIFICANT CHANGE UP (ref 0–0.5)
EOSINOPHIL NFR BLD AUTO: 0.2 % — SIGNIFICANT CHANGE UP (ref 0–6)
GLUCOSE SERPL-MCNC: 97 MG/DL — SIGNIFICANT CHANGE UP (ref 70–99)
HCT VFR BLD CALC: 35.6 % — SIGNIFICANT CHANGE UP (ref 34.5–45)
HGB BLD-MCNC: 12 G/DL — SIGNIFICANT CHANGE UP (ref 11.5–15.5)
LYMPHOCYTES # BLD AUTO: 1.9 K/UL — SIGNIFICANT CHANGE UP (ref 1–3.3)
LYMPHOCYTES # BLD AUTO: 12.2 % — LOW (ref 13–44)
MCHC RBC-ENTMCNC: 29.5 PG — SIGNIFICANT CHANGE UP (ref 27–34)
MCHC RBC-ENTMCNC: 33.8 G/DL — SIGNIFICANT CHANGE UP (ref 32–36)
MCV RBC AUTO: 87.2 FL — SIGNIFICANT CHANGE UP (ref 80–100)
MONOCYTES # BLD AUTO: 1.9 K/UL — HIGH (ref 0–0.9)
MONOCYTES NFR BLD AUTO: 11.7 % — SIGNIFICANT CHANGE UP (ref 2–14)
NEUTROPHILS # BLD AUTO: 12 K/UL — HIGH (ref 1.8–7.4)
NEUTROPHILS NFR BLD AUTO: 75.6 % — SIGNIFICANT CHANGE UP (ref 43–77)
OSMOLALITY SERPL: 272 MOS/KG
PLATELET # BLD AUTO: 266 K/UL — SIGNIFICANT CHANGE UP (ref 150–400)
POTASSIUM SERPL-MCNC: 4 MMOL/L — SIGNIFICANT CHANGE UP (ref 3.5–5.3)
POTASSIUM SERPL-SCNC: 4 MMOL/L — SIGNIFICANT CHANGE UP (ref 3.5–5.3)
RBC # BLD: 4.08 M/UL — SIGNIFICANT CHANGE UP (ref 3.8–5.2)
RBC # FLD: 15.3 % — HIGH (ref 10.3–14.5)
SODIUM SERPL-SCNC: 131 MMOL/L — LOW (ref 135–145)
URATE SERPL-MCNC: 5 MG/DL
WBC # BLD: 15.9 K/UL — HIGH (ref 3.8–10.5)
WBC # FLD AUTO: 15.9 K/UL — HIGH (ref 3.8–10.5)

## 2019-04-03 ENCOUNTER — APPOINTMENT (OUTPATIENT)
Age: 69
End: 2019-04-03

## 2019-04-03 ENCOUNTER — FORM ENCOUNTER (OUTPATIENT)
Age: 69
End: 2019-04-03

## 2019-04-03 LAB
OSMOLALITY UR: 528 MOS/KG
SODIUM ?TM SUB UR QN: <20 MMOL/L

## 2019-04-04 ENCOUNTER — OUTPATIENT (OUTPATIENT)
Dept: OUTPATIENT SERVICES | Facility: HOSPITAL | Age: 69
LOS: 1 days | End: 2019-04-04
Payer: COMMERCIAL

## 2019-04-04 ENCOUNTER — RESULT REVIEW (OUTPATIENT)
Age: 69
End: 2019-04-04

## 2019-04-04 ENCOUNTER — APPOINTMENT (OUTPATIENT)
Age: 69
End: 2019-04-04

## 2019-04-04 ENCOUNTER — APPOINTMENT (OUTPATIENT)
Dept: CT IMAGING | Facility: IMAGING CENTER | Age: 69
End: 2019-04-04
Payer: COMMERCIAL

## 2019-04-04 DIAGNOSIS — Z00.8 ENCOUNTER FOR OTHER GENERAL EXAMINATION: ICD-10-CM

## 2019-04-04 LAB
BUN SERPL-MCNC: 6 MG/DL — LOW (ref 7–23)
CA-I BLDA-SCNC: 1.13 MMOL/L — SIGNIFICANT CHANGE UP (ref 1.12–1.3)
CHLORIDE SERPL-SCNC: 98 MMOL/L — SIGNIFICANT CHANGE UP (ref 96–108)
CO2 SERPL-SCNC: 22 MMOL/L — SIGNIFICANT CHANGE UP (ref 22–31)
CREAT SERPL-MCNC: 0.2 MG/DL — LOW (ref 0.5–1.3)
GLUCOSE SERPL-MCNC: 99 MG/DL — SIGNIFICANT CHANGE UP (ref 70–99)
POTASSIUM SERPL-MCNC: 3.9 MMOL/L — SIGNIFICANT CHANGE UP (ref 3.5–5.3)
POTASSIUM SERPL-SCNC: 3.9 MMOL/L — SIGNIFICANT CHANGE UP (ref 3.5–5.3)
SODIUM SERPL-SCNC: 133 MMOL/L — LOW (ref 135–145)

## 2019-04-04 PROCEDURE — 71260 CT THORAX DX C+: CPT

## 2019-04-04 PROCEDURE — 74177 CT ABD & PELVIS W/CONTRAST: CPT

## 2019-04-04 PROCEDURE — 74177 CT ABD & PELVIS W/CONTRAST: CPT | Mod: 26

## 2019-04-04 PROCEDURE — 71260 CT THORAX DX C+: CPT | Mod: 26

## 2019-04-05 DIAGNOSIS — C80.1 MALIGNANT (PRIMARY) NEOPLASM, UNSPECIFIED: ICD-10-CM

## 2019-04-12 ENCOUNTER — APPOINTMENT (OUTPATIENT)
Dept: HEMATOLOGY ONCOLOGY | Facility: CLINIC | Age: 69
End: 2019-04-12
Payer: COMMERCIAL

## 2019-04-12 VITALS
BODY MASS INDEX: 27.61 KG/M2 | SYSTOLIC BLOOD PRESSURE: 118 MMHG | HEART RATE: 98 BPM | WEIGHT: 136.69 LBS | OXYGEN SATURATION: 96 % | TEMPERATURE: 98.2 F | DIASTOLIC BLOOD PRESSURE: 70 MMHG | RESPIRATION RATE: 16 BRPM

## 2019-04-12 DIAGNOSIS — G89.3 NEOPLASM RELATED PAIN (ACUTE) (CHRONIC): ICD-10-CM

## 2019-04-12 DIAGNOSIS — E87.1 HYPO-OSMOLALITY AND HYPONATREMIA: ICD-10-CM

## 2019-04-12 DIAGNOSIS — A41.51 SEPSIS DUE TO ESCHERICHIA COLI [E. COLI]: ICD-10-CM

## 2019-04-12 DIAGNOSIS — Z87.898 PERSONAL HISTORY OF OTHER SPECIFIED CONDITIONS: ICD-10-CM

## 2019-04-12 DIAGNOSIS — C80.1 MALIGNANT (PRIMARY) NEOPLASM, UNSPECIFIED: ICD-10-CM

## 2019-04-12 PROCEDURE — 99214 OFFICE O/P EST MOD 30 MIN: CPT

## 2019-04-14 ENCOUNTER — FORM ENCOUNTER (OUTPATIENT)
Age: 69
End: 2019-04-14

## 2019-04-15 ENCOUNTER — APPOINTMENT (OUTPATIENT)
Dept: ULTRASOUND IMAGING | Facility: IMAGING CENTER | Age: 69
End: 2019-04-15
Payer: COMMERCIAL

## 2019-04-15 ENCOUNTER — OUTPATIENT (OUTPATIENT)
Dept: OUTPATIENT SERVICES | Facility: HOSPITAL | Age: 69
LOS: 1 days | End: 2019-04-15
Payer: COMMERCIAL

## 2019-04-15 ENCOUNTER — APPOINTMENT (OUTPATIENT)
Age: 69
End: 2019-04-15

## 2019-04-15 DIAGNOSIS — C80.1 MALIGNANT (PRIMARY) NEOPLASM, UNSPECIFIED: ICD-10-CM

## 2019-04-15 PROBLEM — Z87.898 HISTORY OF DIARRHEA: Status: RESOLVED | Noted: 2019-03-27 | Resolved: 2019-04-15

## 2019-04-15 PROBLEM — A41.51 ESCHERICHIA COLI SEPSIS: Status: RESOLVED | Noted: 2019-01-07 | Resolved: 2019-04-15

## 2019-04-15 PROBLEM — E87.1 HYPONATREMIA: Status: ACTIVE | Noted: 2019-04-02

## 2019-04-15 PROBLEM — Z87.898 HISTORY OF WEIGHT LOSS: Status: RESOLVED | Noted: 2019-01-02 | Resolved: 2019-04-15

## 2019-04-15 PROBLEM — G89.3 PAIN OF METASTATIC MALIGNANCY: Status: ACTIVE | Noted: 2019-01-30

## 2019-04-15 PROCEDURE — 76700 US EXAM ABDOM COMPLETE: CPT | Mod: 26

## 2019-04-15 PROCEDURE — 76700 US EXAM ABDOM COMPLETE: CPT

## 2019-04-15 NOTE — PHYSICAL EXAM
[Restricted in physically strenuous activity but ambulatory and able to carry out work of a light or sedentary nature] : Status 1- Restricted in physically strenuous activity but ambulatory and able to carry out work of a light or sedentary nature, e.g., light house work, office work [Normal] : affect appropriate [de-identified] : + abdominal distention

## 2019-04-15 NOTE — CONSULT LETTER
[Dear  ___] : Dear  [unfilled], [Courtesy Letter:] : I had the pleasure of seeing your patient, [unfilled], in my office today. [Please see my note below.] : Please see my note below. [Sincerely,] : Sincerely, [Consult Closing:] : Thank you very much for allowing me to participate in the care of this patient.  If you have any questions, please do not hesitate to contact me. [FreeTextEntry3] : Lanny Scruggs D.O. \par Attending Physician \par [facility] of [facility] Oncology and Hematology\par  \par [facility] \par Tel: [phone]\par Fax: [phone]\par

## 2019-04-15 NOTE — REVIEW OF SYSTEMS
[Recent Change In Weight] : ~T recent weight change [Fatigue] : fatigue [Constipation] : constipation [Abdominal Pain] : abdominal pain [Negative] : Allergic/Immunologic

## 2019-04-15 NOTE — HISTORY OF PRESENT ILLNESS
[Disease: _____________________] : Disease: [unfilled] [AJCC Stage: ____] : AJCC Stage: [unfilled] [Therapy: ___] : Therapy: [unfilled] [Cycle: ___] : Cycle: [unfilled] [Day: ___] : Day: [unfilled] [de-identified] : Mrs Neri is a 68 year old female with no significant past medical history presenting to the office for an initial consultation carcinoma of unknown primary.  She was in [place] from Nov - Dec 2018 and while there noted ongoing weight loss. Lost total of 35 lbs in 3-4 mos. This was a/w decrease in appetite and fatigue. Attributed some of the weight loss to exercising. When she returned on Dec 10th, she developed fevers Tmax 103F and abdominal pain. She was evaluated by her PCP who noted a WBC 19 and initiated an infectious work up and referral to ID. ID requested a CT A/P which was performed on 1/12/19. This showed numerous hypodense hepatic lesions, largest is 11 cm, R adnexal dermoid in the left uterine body. Both infectious and neoplastic etiologies possible. Concern was for liver abscess and pt was directed to the [facility] ER. Admitted to [facility] 1/15-1/18/19. Noted to have persistent leukocytosis. Infectious work up negative. Underwent biopsy of liver lesion which was c/w malignancy. Referred to medical oncology. \par \par 2/5/19 PET/CT: no primary is elucidated on this study: pericardial implant, upper RP LN, multiple hepatic mets\par 2/11/19: C1 FOLFOX (20% dose reduction due to low PS)\par 2/14-2/20/19: admitted with GNR sepsis\par 3/4/19: C2\par 3/18/19: C3\par 4/1/19: C4\par \par \par \par \par \par \par  [de-identified] : poorly differentiated adenocarcinoma  [de-identified] : CA 19-9 1122 AFP 1.6 [de-identified] : CK19, CK7(focal): positive\par HEPAR, TTF1, LINA-3, CK20, PAX-8, ER, ARGINASE, SYNAPTOPHYSIN,\par CHROMOGRANIN, CD56, INHIBIN: all are negative.\par Ki67: aproaximally 20-25%\par \par Foundation: \par DELFINO \par PDL1 0%\par BAP 1 D672\par NRAS\par PBRM1\par  [de-identified] : Humble presents for f/u and review of recent scans. She required IV hydration last week for hyponatremia that was found to be 2/2 dehydration. \sophia Has been having increasing abdominal fullness, but discomfort has been intermittent. Has not used Oxycodone in the last 2 days, but did use prior to that. \par Has gained 8 lbs since last visit, although appetite is not very good. \par + fatigue. \par

## 2019-04-17 ENCOUNTER — APPOINTMENT (OUTPATIENT)
Age: 69
End: 2019-04-17

## 2019-04-23 ENCOUNTER — OUTPATIENT (OUTPATIENT)
Dept: OUTPATIENT SERVICES | Facility: HOSPITAL | Age: 69
LOS: 1 days | Discharge: HOME | End: 2019-04-23

## 2019-04-23 DIAGNOSIS — C22.9 MALIGNANT NEOPLASM OF LIVER, NOT SPECIFIED AS PRIMARY OR SECONDARY: ICD-10-CM

## 2019-04-24 ENCOUNTER — INBOUND DOCUMENT (OUTPATIENT)
Age: 69
End: 2019-04-24

## 2019-04-29 ENCOUNTER — APPOINTMENT (OUTPATIENT)
Age: 69
End: 2019-04-29

## 2019-05-01 ENCOUNTER — APPOINTMENT (OUTPATIENT)
Age: 69
End: 2019-05-01

## 2019-05-07 ENCOUNTER — INBOUND DOCUMENT (OUTPATIENT)
Age: 69
End: 2019-05-07

## 2019-05-22 ENCOUNTER — INBOUND DOCUMENT (OUTPATIENT)
Age: 69
End: 2019-05-22

## 2019-05-24 ENCOUNTER — INBOUND DOCUMENT (OUTPATIENT)
Age: 69
End: 2019-05-24

## 2019-06-03 ENCOUNTER — INBOUND DOCUMENT (OUTPATIENT)
Age: 69
End: 2019-06-03

## 2019-06-11 ENCOUNTER — INBOUND DOCUMENT (OUTPATIENT)
Age: 69
End: 2019-06-11

## 2019-07-01 ENCOUNTER — INBOUND DOCUMENT (OUTPATIENT)
Age: 69
End: 2019-07-01

## 2019-07-15 ENCOUNTER — INBOUND DOCUMENT (OUTPATIENT)
Age: 69
End: 2019-07-15

## 2019-08-13 ENCOUNTER — INBOUND DOCUMENT (OUTPATIENT)
Age: 69
End: 2019-08-13

## 2019-10-22 ENCOUNTER — INBOUND DOCUMENT (OUTPATIENT)
Age: 69
End: 2019-10-22

## 2019-12-10 ENCOUNTER — INBOUND DOCUMENT (OUTPATIENT)
Age: 69
End: 2019-12-10

## 2019-12-26 ENCOUNTER — INBOUND DOCUMENT (OUTPATIENT)
Age: 69
End: 2019-12-26

## 2019-12-30 ENCOUNTER — INBOUND DOCUMENT (OUTPATIENT)
Age: 69
End: 2019-12-30

## 2020-01-09 ENCOUNTER — INBOUND DOCUMENT (OUTPATIENT)
Age: 70
End: 2020-01-09

## 2020-09-08 NOTE — PATIENT PROFILE ADULT - NSSTREETDRUGUSE_GEN_A_NUR
is calling with concerns for possible reaction to the medication pantoprazole.       states she had only been on it a few days and then on Thursday she was informed to increase dosing to two a day.   states on Friday she started with itchiness and cotton tongue.    Please advise.  Pharmacy verified.   can be reached at [phone].   never used

## 2021-01-21 NOTE — PROGRESS NOTE ADULT - PROBLEM SELECTOR PROBLEM 1
SIRS (systemic inflammatory response syndrome) Render Risk Assessment In Note?: no Additional Notes: Pt to RTC if unimproved or worsenend in 3 wks. Discussed how there may be a rebound flare when switching from clobetasol (pt was using script for different rash) to ketoconazole Detail Level: Simple

## 2021-03-03 NOTE — ED PROVIDER NOTE - NSCAREINITIATED _GEN_ER
Jac Morris(Resident) Medical Necessity Statement: Based on my medical judgement, Mohs surgery is the most appropriate treatment for this cancer compared to other treatments.

## 2021-11-30 NOTE — PROGRESS NOTE ADULT - PROBLEM SELECTOR PLAN 2
E coli bacteremia, on zosyn. Will change Abx based on sensitivities.    - Repeat BCx 2/15 NGD.   - source likely GI, f/u urinary studies. E coli bacteremia. Transition to ertapenem.    - Repeat BCx 2/15 NGD.   - source likely GI. Cimzia Pregnancy And Lactation Text: This medication crosses the placenta but can be considered safe in certain situations. Cimzia may be excreted in breast milk.

## 2022-05-13 NOTE — DIETITIAN INITIAL EVALUATION ADULT. - PROBLEM SELECTOR PROBLEM 3
Patient is in the lateral left side position.   The body was positioned using the following devices: wedge.  
Specimens verified per policy. With Zaida  
Ovarian mass

## 2022-06-06 NOTE — DISCHARGE NOTE ADULT - HOSPITAL COURSE
69 yo F with no medical history presenting w/ liver mass noted on outpatient CT in setting of intermittent abdominal pain and fevers x1 month. Pt stated that the pain is waxing/waning, dull, diffuse, 3/10 in intensity, and with no clear inciting factors. Outpatient CT A/P showed  numerous hypodense hepatic lesions (largest 11.1 cm) with mild pericholecystic stranding and wall thickening. It also demonstrated a right adnexal dermoid ill-defined hypodensity of the left uterine body and fundal myometrium and fluid extending from the left adnexa. In the ED, WBCs 19, alk phos 164, AST 50, ALT 15. Pt received ceftriaxone 2g, metronidazole 500 mg, zosyn x1, 2 L NS. She was admitted to the medical floors for continued work-up and management. She was continued on IV zosyn x2 days and then switched to oral ciprofloxacin and flagyl. Pt had slight decrease in WBCs. Transvaginal ultrasound showed a moderate amount of fluid within the endometrial cavity, a right ovarian dermoid, and small volume free fluid in the R adnexa and cul-de-sac.     Small volume free fluid in the right adnexa and cul-de-sac. 69 yo F with no medical history presenting w/ liver mass noted on outpatient CT in setting of intermittent abdominal pain and fevers x1 month. Pt stated that the pain is waxing/waning, dull, diffuse, 3/10 in intensity, and with no clear inciting factors. Outpatient CT A/P showed  numerous hypodense hepatic lesions (largest 11.1 cm) with mild pericholecystic stranding and wall thickening. It also demonstrated a right adnexal dermoid ill-defined hypodensity of the left uterine body and fundal myometrium and fluid extending from the left adnexa. In the ED, WBCs 19, alk phos 164, AST 50, ALT 15. Pt received ceftriaxone 2g, metronidazole 500 mg, zosyn x1, 2 L NS. She was admitted to the medical floors for continued work-up and management. She was continued on IV zosyn x2 days and then switched to oral ciprofloxacin and flagyl.  Pt had modest decrease in WBCs but remained afebrile. Pt underwent IR biopsy of the hepatic mass; tissue samples were obtained but no fluid. Transvaginal ultrasound showed a moderate amount of fluid within the endometrial cavity, a right ovarian dermoid, and small volume free fluid in the R adnexa and cul-de-sac. CA 19-9 was elevated to 1122. Pt experienced N/V/abdominal pain after starting cipro/flagyl. Given low suspicion for infectious etiology after w/u, abx were d/c-ed. Pt currently hemodynamically stable and medically clear for d/c home with outpatient f/u of biopsy results. Non-scheduled Urgent 69 yo F with no medical history presenting w/ liver mass noted on outpatient CT in setting of intermittent abdominal pain and fevers x1 month. Pt stated that the pain is waxing/waning, dull, diffuse, 3/10 in intensity, and with no clear inciting factors. Outpatient CT A/P showed  numerous hypodense hepatic lesions (largest 11.1 cm) with mild pericholecystic stranding and wall thickening. It also demonstrated a right adnexal dermoid ill-defined hypodensity of the left uterine body and fundal myometrium and fluid extending from the left adnexa. In the ED, WBCs 19, alk phos 164, AST 50, ALT 15. Pt received ceftriaxone 2g, metronidazole 500 mg, zosyn x1, 2 L NS. She was admitted to the medical floors for continued work-up and management. She was continued on IV zosyn x2 days and then switched to oral ciprofloxacin and flagyl.  Pt had modest decrease in WBCs but remained afebrile. Pt underwent IR biopsy of the hepatic mass; tissue samples were obtained but no fluid. Transvaginal ultrasound showed a moderate amount of fluid within the endometrial cavity, a right ovarian dermoid, and small volume free fluid in the R adnexa and cul-de-sac. CA 19-9 was elevated to 1122. Pt experienced N/V/abdominal pain after starting cipro/flagyl. Blood cultures showed NGTD at 72 hours. Given low suspicion for infectious etiology after w/u, abx were d/c-ed. Pt currently hemodynamically stable and medically clear for d/c home with outpatient f/u of biopsy results. 69 yo F with no medical history presenting w/ liver mass noted on outpatient CT in setting of intermittent abdominal pain and fevers x1 month. Pt stated that the pain is waxing/waning, dull, diffuse, 3/10 in intensity, and with no clear inciting factors. Outpatient CT A/P showed  numerous hypodense hepatic lesions (largest 11.1 cm) with mild pericholecystic stranding and wall thickening. It also demonstrated a right adnexal dermoid ill-defined hypodensity of the left uterine body and fundal myometrium and fluid extending from the left adnexa. In the ED, WBCs 19, alk phos 164, AST 50, ALT 15. Pt received ceftriaxone 2g, metronidazole 500 mg, zosyn x1, 2 L NS. She was admitted to the medical floors for continued work-up and management. She was continued on IV zosyn x2 days and then switched to oral ciprofloxacin and flagyl.  Pt had modest decrease in WBCs but remained afebrile. Pt underwent IR biopsy of the hepatic mass; tissue samples were obtained but no fluid. Transvaginal ultrasound showed a moderate amount of fluid within the endometrial cavity, a right ovarian dermoid, and small volume free fluid in the R adnexa and cul-de-sac. CA 19-9 was elevated to 1122. Pt experienced N/V/abdominal pain after starting cipro/flagyl. Blood cultures showed NGTD at 72 hours. Given low suspicion for infectious etiology after w/u, abx were d/c-ed. Quantiferon gold came back indeterminate, pt will require CXR after d/c. Pt currently hemodynamically stable and medically clear for d/c home with outpatient f/u of biopsy results.

## 2022-10-18 NOTE — PROGRESS NOTE ADULT - PROBLEM/PLAN-1
10/19/22      Natasha Richard   Re[facility] Therapy [facility] [id number]       You have been scheduled at [facility] for a Transesophageal Echocardiogram and/or Cardioversion on 10/24/2022 at 2pm, with Dr. Barba.    Please arrive at 12pm.    Please register at the Main Entrance/Information Desk of [facility]. From there you will go to the Same Day Surgery where they will prep you for your procedure. Once the OR staff is ready for you, they will take you to the OR for your procedure. The procedure itself does not take long, but you will be in Same Day Surgery until you are awake enough to go home. Please plan on being here for a total of 3-4 hours.    PATIENT INSTRUCTIONS:    • NO FOOD OR DRINK AFTER 6:00 AM THE DAY OF YOUR PROCEDURE (small sips of water for dry mouth and a light breakfast prior to 6:00 AM are okay) ABSOLUTELY NO DAIRY PRIOR TO PROCEDURE, THIS INCLUDES CREAMER IN COFFEE.    • DO TAKE YOUR MEDICATIONS PRIOR TO 6AM THE  MORNING OF YOUR PROCEDURE    • PLEASE BRING ALL YOUR MEDICATIONS WITH YOU TO THE PROCEDURE      YOU WILL NEED A  TO TAKE YOU HOME    You will need to have labs drawn (BMP/Mag/PT-INR if taking Coumadin, Warfarin or Jantoven) prior to the procedure.      Due to the COVID-19 pandemic, you will be scheduled for COVID-19 testing prior to your procedure.  This is being done as safety is our top priority for you and our team.     You are scheduled to have your COVID test at [facility] on 10/22/2022 at 11:40AM a.m.     If you test positive, you will be notified by your physician prior to your procedure who will consider rescheduling or delaying when able; otherwise, the negative result will be relayed to you on the day of your procedure.  If you start to experience any of the symptoms listed below, after you receive your COVID-19 test and prior to your procedure, you are required to call the physician’s clinic to determine if procedure will  still occur or be completed.  · Temperature:  Fever > 100.0  · Respiratory Symptoms:  new or worsening cough, shortness of breath, sore throat  · GI Symptoms:  New onset of nausea, vomiting or diarrhea  · New onset of loss of taste or smell    Self-quarantine is recommended and minimizes your risk of exposure before your procedure. If you are unable to self-quarantine, please continue to wear a mask, practice social distancing and perform good hand hygiene. If you are unable to attend your COVID-19 testing appointment on the date listed above, you are required to call your physician to reschedule.      If you have any questions or concerns prior to the procedure, please contact us at [phone].          Sincerely,     [facility]   [facility]  [place] [address]  Phone: [phone]               DISPLAY PLAN FREE TEXT

## 2023-06-27 NOTE — PROGRESS NOTE ADULT - SUBJECTIVE AND OBJECTIVE BOX
Ines Gardner MD PGY1     Pager [phone]/ [phone]    For Night coverage 7pm-7am: NS- page [phone] Team1-3, page [phone] Team4 & Care Model  Sat/[place] Cross Coverage 12pm-7pm: NS- page [phone] for Team1-4, [facility]- pager forwarded to covering Resident    RENETTA SAUCEDO  68y  Female    Complaints:  Subjective:                 T(C): 37.2 (01-16-19 @ 06:15), Max: 37.2 (01-16-19 @ 06:15)  HR: 84 (01-16-19 @ 06:15) (77 - 95)  BP: 142/79 (01-16-19 @ 06:15) (133/75 - 150/102)  RR: 18 (01-16-19 @ 06:15) (16 - 18)  SpO2: 99% (01-16-19 @ 06:15) (93% - 100%)  Wt(kg): --Vital Signs Last 24 Hrs  T(C): 37.2 (16 Jan 2019 06:15), Max: 37.2 (16 Jan 2019 06:15)  T(F): 98.9 (16 Jan 2019 06:15), Max: 98.9 (16 Jan 2019 06:15)  HR: 84 (16 Jan 2019 06:15) (77 - 95)  BP: 142/79 (16 Jan 2019 06:15) (133/75 - 150/102)  BP(mean): --  RR: 18 (16 Jan 2019 06:15) (16 - 18)  SpO2: 99% (16 Jan 2019 06:15) (93% - 100%)    PHYSICAL EXAM:  GENERAL: NAD, well-groomed, well-developed  HEAD:  Atraumatic, Normocephalic  EYES: EOMI, PERRLA, conjunctiva and sclera clear  ENMT: No tonsillar erythema, exudates, or enlargement; Moist mucous membranes, Good dentition, No lesions  NECK: Supple, No JVD, Normal thyroid  NERVOUS SYSTEM:  Alert & Oriented X3, Good concentration; Motor Strength 5/5 B/L upper and lower extremities; DTRs 2+ intact and symmetric  CHEST/LUNG: Clear to percussion bilaterally; No rales, rhonchi, wheezing, or rubs  HEART: Regular rate and rhythm; No murmurs, rubs, or gallops  ABDOMEN: Soft, Nontender, Nondistended; Bowel sounds present  EXTREMITIES:  2+ Peripheral Pulses, No clubbing, cyanosis, or edema  LYMPH: No lymphadenopathy noted  SKIN: No rashes or lesions    Consultant(s) Notes Reviewed:  [x ] YES  [ ] NO  Care Discussed with Consultants/Other Providers [ x] YES  [ ] NO    LABS:          RADIOLOGY & ADDITIONAL TESTS:    Imaging Personally Reviewed:  [ ] YES  [ ] NO  MedsMEDICATIONS  (STANDING):  piperacillin/tazobactam IVPB. 3.375 Gram(s) IV Intermittent every 8 hours    MEDICATIONS  (PRN):  acetaminophen   Tablet .. 650 milliGRAM(s) Oral every 6 hours PRN Mild Pain (1 - 3), Moderate Pain (4 - 6) Doxycycline Pregnancy And Lactation Text: This medication is Pregnancy Category D and not consider safe during pregnancy. It is also excreted in breast milk but is considered safe for shorter treatment courses.

## 2023-11-17 NOTE — CONSULT NOTE ADULT - ATTENDING COMMENTS
68F recently Dx Unknown Primary Metastatic CA (Pancreatic-Hepatobiliary) started on CXT with non-specific skin rashes on chest and abdomen p/w Febrile Sepsis 102*F and Hypotension on 3.5Li IV fluid resuscitation with mild abdominal discomfort on deep palpation.   - Drug rashes related to Rocephin vs. Zosyn unclear but may switch to Carbapenem or Aztreonam    - Consider CT abdomen to R/O Colitis / Typhilitis with local fluid collection   - Urinary Osm and L'yte to confirm on Hypovolemic Hyponatremia with Lactate 1.8   - 5% Albumin 250 cc IV bolus and start Midodrine 10 mg po
Pt seen, examined and d/w fellow. Agree with above A/P. H/O recently diagnosed Ca of unknown primary with liver mets s/p FOLFOX #1 sseveral days ago. Admitted with lethargy and non-neutropenic fever w/o clear source. HAs gram neg bacteremia / sepsis. PE lethargic but arousable female. Lungs clear heart RRR S1S2 abd + BS soft /NY/ no rebound A/P Cont abx pending ID + S for GN bacteremia / sepsis. S/P FOLFOX but not neutropenic; follow counts closely. Further treatment pending recovery and completion of abx course.
HCP form given and explained

## 2023-11-22 NOTE — PROGRESS NOTE ADULT - PROBLEM/PLAN-4
Duration Of Freeze Thaw-Cycle (Seconds): 0 Detail Level: Detailed Render Note In Bullet Format When Appropriate: No Show Applicator Variable?: Yes DISPLAY PLAN FREE TEXT Post-Care Instructions: I reviewed with the patient in detail post-care instructions. Patient is to wear sunprotection, and avoid picking at any of the treated lesions. Pt may apply Vaseline to crusted or scabbing areas. Consent: The patient's verbal consent was obtained including but not limited to risks of crusting, scabbing, blistering, scarring, darker or lighter pigmentary change, recurrence, incomplete removal and infection.

## 2025-02-12 NOTE — PATIENT PROFILE ADULT - FUNCTIONAL SCREEN CURRENT LEVEL: BATHING, MLM
Problem: Adult Inpatient Plan of Care  Goal: Plan of Care Review  Outcome: Progressing  Goal: Patient-Specific Goal (Individualized)  Outcome: Progressing  Goal: Absence of Hospital-Acquired Illness or Injury  Outcome: Progressing  Goal: Optimal Comfort and Wellbeing  Outcome: Progressing  Goal: Readiness for Transition of Care  Outcome: Progressing     Problem: Stroke, Ischemic (Includes Transient Ischemic Attack)  Goal: Optimal Coping  Outcome: Progressing  Goal: Effective Bowel Elimination  Outcome: Progressing  Goal: Optimal Cerebral Tissue Perfusion  Outcome: Progressing  Goal: Optimal Cognitive Function  Outcome: Progressing  Goal: Improved Communication Skills  Outcome: Progressing  Goal: Optimal Functional Ability  Outcome: Progressing  Goal: Optimal Nutrition Intake  Outcome: Progressing  Goal: Effective Oxygenation and Ventilation  Outcome: Progressing  Goal: Improved Sensorimotor Function  Outcome: Progressing  Goal: Safe and Effective Swallow  Outcome: Progressing  Goal: Effective Urinary Elimination  Outcome: Progressing     Problem: Fall Injury Risk  Goal: Absence of Fall and Fall-Related Injury  Outcome: Progressing      2 = assistive person